# Patient Record
Sex: FEMALE | Race: WHITE | NOT HISPANIC OR LATINO | Employment: OTHER | URBAN - METROPOLITAN AREA
[De-identification: names, ages, dates, MRNs, and addresses within clinical notes are randomized per-mention and may not be internally consistent; named-entity substitution may affect disease eponyms.]

---

## 2021-02-15 ENCOUNTER — OFFICE VISIT (OUTPATIENT)
Dept: FAMILY MEDICINE | Facility: CLINIC | Age: 66
End: 2021-02-15
Payer: COMMERCIAL

## 2021-02-15 VITALS
OXYGEN SATURATION: 100 % | BODY MASS INDEX: 21.34 KG/M2 | HEART RATE: 72 BPM | DIASTOLIC BLOOD PRESSURE: 78 MMHG | HEIGHT: 64 IN | SYSTOLIC BLOOD PRESSURE: 126 MMHG | TEMPERATURE: 97.6 F | WEIGHT: 125 LBS | RESPIRATION RATE: 18 BRPM

## 2021-02-15 DIAGNOSIS — Z12.31 VISIT FOR SCREENING MAMMOGRAM: ICD-10-CM

## 2021-02-15 DIAGNOSIS — Z12.11 SCREEN FOR COLON CANCER: ICD-10-CM

## 2021-02-15 DIAGNOSIS — Z78.0 ASYMPTOMATIC POSTMENOPAUSAL STATUS: ICD-10-CM

## 2021-02-15 DIAGNOSIS — Z01.818 PREOP GENERAL PHYSICAL EXAM: Primary | ICD-10-CM

## 2021-02-15 PROCEDURE — 99204 OFFICE O/P NEW MOD 45 MIN: CPT | Performed by: FAMILY MEDICINE

## 2021-02-15 RX ORDER — IBUPROFEN 800 MG/1
TABLET, FILM COATED ORAL PRN
COMMUNITY
Start: 2020-10-15 | End: 2021-02-15

## 2021-02-15 ASSESSMENT — MIFFLIN-ST. JEOR: SCORE: 1092

## 2021-02-15 NOTE — PROGRESS NOTES
[facility]  [address] [place] [address]  [place] [address]  Phone: [phone]  Primary Provider: Isis Sargent  Pre-op Performing Provider: ISIS SARGENT      PREOPERATIVE EVALUATION:  Today's date: 2/15/2021    Diana Thompson is a 65 year old female who presents for a preoperative evaluation.    Surgical Information:  Surgery/Procedure: Eye Lid - Brow Ptosis Repair  Surgery Location: [facility]  Surgeon: Kristopher Lord MD  Surgery Date: 2/18/21  Time of Surgery: 10:30  Where patient plans to recover: At home with family  Fax number for surgical facility: [phone]    Type of Anesthesia Anticipated: General    Assessment & Plan     The proposed surgical procedure is considered LOW risk.    Preop general physical exam  Doing well    Visit for screening mammogram  Advised   - MA SCREENING DIGITAL BILAT - Future  (s+30); Future    Asymptomatic postmenopausal status  Advised   - DEXA HIP/PELVIS/SPINE - Future; Future    Screen for colon cancer  Advised   - COLOGUARD(EXACT SCIENCES)    Risks and Recommendations:  The patient has the following additional risks and recommendations for perioperative complications:   - No identified additional risk factors other than previously addressed    Medication Instructions:  Patient is on no chronic medications    RECOMMENDATION:  APPROVAL GIVEN to proceed with proposed procedure, without further diagnostic evaluation.    Review of external notes as documented above                   Subjective     HPI related to upcoming procedure: pt scheduled for surgery for Droopy eyelids    Preop Questions 2/15/2021   1. Have you ever had a heart attack or stroke? No   2. Have you ever had surgery on your heart or blood vessels, such as a stent placement, a coronary artery bypass, or surgery on an artery in your head, neck, heart, or legs? No   3. Do you have chest pain with activity? No   4. Do you have a history of  heart failure? No   5. Do you currently have  a cold, bronchitis or symptoms of other infection? No   6. Do you have a cough, shortness of breath, or wheezing? No   7. Do you or anyone in your family have previous history of blood clots? No   8. Do you or does anyone in your family have a serious bleeding problem such as prolonged bleeding following surgeries or cuts? No   9. Have you ever had problems with anemia or been told to take iron pills? YES - many years ago with pregnancy   10. Have you had any abnormal blood loss such as black, tarry or bloody stools, or abnormal vaginal bleeding? No   11. Have you ever had a blood transfusion? YES - several years ago   11a. Have you ever had a transfusion reaction? No   12. Are you willing to have a blood transfusion if it is medically needed before, during, or after your surgery? Yes   13. Have you or any of your relatives ever had problems with anesthesia? No   14. Do you have sleep apnea, excessive snoring or daytime drowsiness? YES - snore   14a. Do you have a CPAP machine? No   15. Do you have any artifical heart valves or other implanted medical devices like a pacemaker, defibrillator, or continuous glucose monitor? No   16. Do you have artificial joints? No   17. Are you allergic to latex? No       Health Care Directive:  Patient does not have a Health Care Directive or Living Will: Discussed advance care planning with patient; information given to patient to review.    Preoperative Review of :   reviewed - no record of controlled substances prescribed.  Review of Systems  CONSTITUTIONAL: NEGATIVE for fever, chills, change in weight  INTEGUMENTARY/SKIN: NEGATIVE for worrisome rashes, moles or lesions  EYES: NEGATIVE for vision changes or irritation  EYES: as above  ENT/MOUTH: NEGATIVE for ear, mouth and throat problems  RESP: NEGATIVE for significant cough or SOB  BREAST: NEGATIVE for masses, tenderness or discharge  CV: NEGATIVE for chest pain, palpitations or peripheral edema  GI: NEGATIVE for nausea,  abdominal pain, heartburn, or change in bowel habits  : NEGATIVE for frequency, dysuria, or hematuria  MUSCULOSKELETAL: NEGATIVE for significant arthralgias or myalgia  NEURO: NEGATIVE for weakness, dizziness or paresthesias  ENDOCRINE: NEGATIVE for temperature intolerance, skin/hair changes  HEME: NEGATIVE for bleeding problems  PSYCHIATRIC: NEGATIVE for changes in mood or affect    Patient Active Problem List    Diagnosis Date Noted     Advanced directives, counseling/discussion 01/22/2014     Priority: Medium     Advance Care Planning:   ACP Review and Resources Provided:  Reviewed chart for advance care plan.  Diana Thompson has no plan or code status on file. Discussed available resources and provided with information. Confirmed code status reflects current choices pending further ACP discussions.  Confirmed/documented designated decision maker(s). See permanent comments section of demographics in clinical tab.  Added by Melissa Behl on 1/22/2014            CARDIOVASCULAR SCREENING; LDL GOAL LESS THAN 160 01/09/2014     Priority: Medium      Past Medical History:   Diagnosis Date     Heart murmur 1981    normal echo      Sleep related leg cramps      Past Surgical History:   Procedure Laterality Date     TUBAL LIGATION  1982     Current Outpatient Medications   Medication Sig Dispense Refill     ketorolac (ACULAR) 0.5 % ophthalmic solution        ofloxacin (OCUFLOX) 0.3 % ophthalmic solution        prednisoLONE acetate (PRED FORTE) 1 % ophthalmic suspension        RESTASIS 0.05 % ophthalmic emulsion          Allergies   Allergen Reactions     Macrodantin [Nitrofurantoin] Hives     Sulfa Drugs         Social History     Tobacco Use     Smoking status: Never Smoker     Smokeless tobacco: Never Used   Substance Use Topics     Alcohol use: Yes     Comment: occasional      Family History   Problem Relation Age of Onset     Diabetes Mother      Heart Disease Mother         CHF     Cancer No family hx of       "Respiratory Father         COPD     Colon Cancer No family hx of      Breast Cancer No family hx of      History   Drug Use No         Objective     /78   Pulse 72   Temp 97.6  F (36.4  C) (Oral)   Resp 18   Ht 1.626 m (5' 4\")   Wt 56.7 kg (125 lb)   SpO2 100%   BMI 21.46 kg/m      Physical Exam    GENERAL APPEARANCE: healthy, alert and no distress     EYES: EOMI, PERRL     HENT: ear canals and TM's normal and nose and mouth without ulcers or lesions     NECK: no adenopathy, no asymmetry, masses, or scars and thyroid normal to palpation     RESP: lungs clear to auscultation - no rales, rhonchi or wheezes     CV: regular rates and rhythm, normal S1 S2, no S3 or S4 and no murmur, click or rub     ABDOMEN:  soft, nontender, no HSM or masses and bowel sounds normal     MS: extremities normal- no gross deformities noted, no evidence of inflammation in joints, FROM in all extremities.     SKIN: no suspicious lesions or rashes     NEURO: Normal strength and tone, sensory exam grossly normal, mentation intact and speech normal     PSYCH: mentation appears normal. and affect normal/bright     LYMPHATICS: No cervical adenopathy    No results for input(s): HGB, PLT, INR, NA, POTASSIUM, CR, A1C in the last 91120 hours.     Diagnostics:     No EKG required for low risk surgery (cataract, skin procedure, breast biopsy, etc).    Revised Cardiac Risk Index (RCRI):  The patient has the following serious cardiovascular risks for perioperative complications:   - No serious cardiac risks = 0 points     RCRI Interpretation: 0 points: Class I (very low risk - 0.4% complication rate)             Signed Electronically by: Isis Hernández MD  Copy of this evaluation report is provided to requesting physician.    [facility] Guidelines    Revised Cardiac Risk Index   "

## 2021-02-15 NOTE — LETTER
March 19, 2021      Diana Thompson  [address] [facility] [address]        Dear Ms.Jay,    We are writing to inform you of your test results.    Your results are normal. Repeat in 3 years.       Resulted Orders   COLOGUARD(EXACT SCIENCES)   Result Value Ref Range    COLOGUARD-ABSTRACT Negative        If you have any questions or concerns, please call the clinic at the number listed above.       Sincerely,      Isis Hernández MD/menendez

## 2021-02-15 NOTE — PATIENT INSTRUCTIONS
Preparing for Your Surgery  Getting started  A nurse will call you to review your health history and instructions. They will give you an arrival time based on your scheduled surgery time.  Please be ready to share the following:    Your doctor's clinic name and phone number    Your medical, surgical and anesthesia history    A list of allergies and sensitivities    A list of medicines, including herbal treatments and over-the-counter drugs    Whether the patient has a legal guardian (ask how to send us the papers in advance)  If you have a child who's having surgery, please ask for a copy of Preparing for Your Child's Surgery.    Preparing for surgery    Within 30 days of surgery: Have a pre-op exam (sometimes called an H&P, or History and Physical). This can be done at a clinic or pre-operative center.  ? If you're having a , you may not need this exam. Talk to your care team    At your pre-op exam, talk to your care team about all medicines you take. If you need to stop any medicines before surgery, ask when to start taking them again.  ? We do this for your safety. Many medicines can make you bleed too much during surgery. Some change how well surgery (anesthesia) drugs work.    Call your insurance company to let them know you're having surgery. (If you don't have insurance, call [phone].)    Call your clinic if there's any change in your health. This includes signs of a cold or flu (sore throat, runny nose, cough, rash, fever). It also includes a scrape or scratch near the surgery site.    If you have questions on the day of surgery, call your hospital or surgery center.  Eating and drinking guidelines  For your safety: Unless your surgeon tells you otherwise, follow the guidelines below.    Eat and drink as usual until 8 hours before surgery. After that, no food or milk.    Drink clear liquids until 2 hours before surgery. These are liquids you can see through, like water, Gatorade and Propel  Water. You may also have black coffee and tea (no cream or milk).    Nothing by mouth within 2 hours of surgery. This includes gum, candy and breath mints.    If you drink, stop drinking alcohol the night before surgery.    If your care team tells you to take medicine on the morning of surgery, it's okay to take it with a sip of water.  Preventing infection    Shower or bathe the night before and morning of your surgery. Follow the instructions your clinic gave you. (If no instructions, use regular soap.)    Don't shave or clip hair near your surgery site. We'll remove the hair if needed.    Don't smoke or vape the morning of surgery. You may chew nicotine gum up to 2 hours before surgery. A nicotine patch is okay.  ? Note: Some surgeries require you to completely quit smoking and nicotine. Check with your surgeon.    Your care team will make every effort to keep you safe from infection. We will:  ? Clean our hands often with soap and water (or an alcohol-based hand rub).  ? Clean the skin at your surgery site with a special soap that kills germs.  ? Give you a special gown to keep you warm. (Cold raises the risk of infection.)  ? Wear special hair covers, masks, gowns and gloves during surgery.  ? Give antibiotic medicine, if prescribed. Not all surgeries need antibiotics.  What to bring on the day of surgery    Photo ID and insurance card    Copy of your health care directive, if you have one    Glasses and hearing aides (bring cases)  ? You can't wear contacts during surgery    Inhaler and eye drops, if you use them (tell us about these when you arrive)    CPAP machine or breathing device, if you use them    A few personal items, if spending the night    If you have . . .  ? A pacemaker or ICD (cardiac defibrillator): Bring the ID card.  ? An implanted stimulator: Bring the remote control.  ? A legal guardian: Bring a copy of the certified (court-stamped) guardianship papers.  Please remove any jewelry, including  body piercings. Leave jewelry and other valuables at home.  If you're going home the day of surgery  Important: If you don't follow the rules below, we must cancel your surgery.     Arrange for someone to drive you home after surgery. You may not drive, take a taxi or take public transportation by yourself (unless you'll have local anesthesia only).    Arrange for a responsible adult to stay with you overnight. If you don't, we may keep you in the hospital overnight, and you may need to pay the costs yourself.  Questions?   If you have any questions for your care team, list them here: _________________________________________________________________________________________________________________________________________________________________________________________________________________________________________________________________________________________________________________________  For informational purposes only. Not to replace the advice of your health care provider. Copyright   2003, 2019 [facility]. All rights reserved. Clinically reviewed by Sierra Stratton MD. [employer] 192169 - REV 4/20.    We are working hard to begin vaccinating more people against COVID-19. Currently, we are only vaccinating individuals age 75 and older and Phase 1a workers - healthcare workers who are unable to do their job remotely. Vaccine availability is very limited.    If you are 75 or older, or a healthcare worker who is unable to do your job remotely, please log in to [employer] using this link to see if we have an open appointment and schedule an appointment.  If there are no appointments left, you will be unable to schedule and need to check back later.  If you are a healthcare worker, you will be asked to provide proof of employment at your appointment. If you cannot, you will be turned away.    Vaccine appointments are being added as they become available. Please check your [employer] account frequently for  availability. If you have technical difficulty using [employer], call [phone] for assistance.    You can learn more about the state's phased approach to administering the vaccine, with details on each phase, [email or web address].      Aa vaccine supply increases and we are able to open appointments to more groups, we will share that information on our website [email or web address]. Check this website to stay up to date on COVID-19 vaccination information.

## 2021-03-12 LAB — COLOGUARD-ABSTRACT: NEGATIVE

## 2021-03-22 ENCOUNTER — ANCILLARY PROCEDURE (OUTPATIENT)
Dept: MAMMOGRAPHY | Facility: CLINIC | Age: 66
End: 2021-03-22
Payer: COMMERCIAL

## 2021-03-22 ENCOUNTER — OFFICE VISIT (OUTPATIENT)
Dept: FAMILY MEDICINE | Facility: CLINIC | Age: 66
End: 2021-03-22
Payer: COMMERCIAL

## 2021-03-22 VITALS
HEIGHT: 64 IN | SYSTOLIC BLOOD PRESSURE: 122 MMHG | HEART RATE: 76 BPM | DIASTOLIC BLOOD PRESSURE: 75 MMHG | TEMPERATURE: 98.6 F | OXYGEN SATURATION: 100 % | WEIGHT: 122 LBS | BODY MASS INDEX: 20.83 KG/M2 | RESPIRATION RATE: 18 BRPM

## 2021-03-22 DIAGNOSIS — Z12.31 VISIT FOR SCREENING MAMMOGRAM: ICD-10-CM

## 2021-03-22 DIAGNOSIS — R63.6 UNDERWEIGHT: ICD-10-CM

## 2021-03-22 DIAGNOSIS — Z71.89 ADVANCED DIRECTIVES, COUNSELING/DISCUSSION: ICD-10-CM

## 2021-03-22 DIAGNOSIS — Z00.00 ENCOUNTER FOR MEDICARE ANNUAL WELLNESS EXAM: Primary | ICD-10-CM

## 2021-03-22 DIAGNOSIS — Z78.0 ASYMPTOMATIC POSTMENOPAUSAL STATUS: ICD-10-CM

## 2021-03-22 LAB
CHOLEST SERPL-MCNC: 279 MG/DL
GLUCOSE SERPL-MCNC: 82 MG/DL (ref 70–99)
HDLC SERPL-MCNC: 86 MG/DL
LDLC SERPL CALC-MCNC: 178 MG/DL
NONHDLC SERPL-MCNC: 193 MG/DL
TRIGL SERPL-MCNC: 76 MG/DL

## 2021-03-22 PROCEDURE — 80061 LIPID PANEL: CPT | Performed by: FAMILY MEDICINE

## 2021-03-22 PROCEDURE — 77067 SCR MAMMO BI INCL CAD: CPT | Mod: TC | Performed by: RADIOLOGY

## 2021-03-22 PROCEDURE — 99397 PER PM REEVAL EST PAT 65+ YR: CPT | Performed by: FAMILY MEDICINE

## 2021-03-22 PROCEDURE — 36415 COLL VENOUS BLD VENIPUNCTURE: CPT | Performed by: FAMILY MEDICINE

## 2021-03-22 PROCEDURE — 84134 ASSAY OF PREALBUMIN: CPT | Performed by: FAMILY MEDICINE

## 2021-03-22 PROCEDURE — 82947 ASSAY GLUCOSE BLOOD QUANT: CPT | Performed by: FAMILY MEDICINE

## 2021-03-22 ASSESSMENT — MIFFLIN-ST. JEOR: SCORE: 1078.39

## 2021-03-22 ASSESSMENT — PAIN SCALES - GENERAL: PAINLEVEL: NO PAIN (0)

## 2021-03-22 ASSESSMENT — ACTIVITIES OF DAILY LIVING (ADL): CURRENT_FUNCTION: NO ASSISTANCE NEEDED

## 2021-03-22 NOTE — PROGRESS NOTES
"SUBJECTIVE:   Diana Thompson is a 65 year old female who presents for Preventive Visit.      Patient has been advised of split billing requirements and indicates understanding: Yes   Are you in the first 12 months of your Medicare coverage?  No    Healthy Habits:    In general, how would you rate your overall health?  Excellent    Frequency of exercise:  None    Do you usually eat at least 4 servings of fruit and vegetables a day, include whole grains    & fiber and avoid regularly eating high fat or \"junk\" foods?  No    Taking medications regularly:  Not Applicable    Barriers to taking medications:  Not applicable    Medication side effects:  Not applicable    Ability to successfully perform activities of daily living:  No assistance needed    Home Safety:  No safety concerns identified    Hearing Impairment:  No hearing concerns    In the past 6 months, have you been bothered by leaking of urine?  No    In general, how would you rate your overall mental or emotional health?  Good      PHQ-2 Total Score:    Additional concerns today:  Yes (LEFT KNEE )    Do you feel safe in your environment? Yes    Have you ever done Advance Care Planning? (For example, a Health Directive, POLST, or a discussion with a medical provider or your loved ones about your wishes): No, advance care planning information given to patient to review.  Patient declined advance care planning discussion at this time.       Fall risk  Fallen 2 or more times in the past year?: No  Any fall with injury in the past year?: No    Cognitive Screening   1) Repeat 3 items (Leader, Season, Table)    2) Clock draw: NORMAL  3) 3 item recall: Recalls 3 objects  Results: NORMAL clock, 1-2 items recalled:     Mini-CogTM Copyright SILVIA Varela. Licensed by the author for use in [facility]; reprinted with permission (gerald@.[facility]). All rights reserved.      Do you have sleep apnea, excessive snoring or daytime drowsiness?: SNORING    Reviewed and updated " as needed this visit by clinical staff  Tobacco  Allergies  Meds   Med Hx  Surg Hx  Fam Hx  Soc Hx        Reviewed and updated as needed this visit by Provider                Social History     Tobacco Use     Smoking status: Never Smoker     Smokeless tobacco: Never Used   Substance Use Topics     Alcohol use: Yes     Comment: occasional      If you drink alcohol do you typically have >3 drinks per day or >7 drinks per week? No    No flowsheet data found.            Current providers sharing in care for this patient include:   Patient Care Team:  Isis Hernández MD as PCP - General (Family Practice)  Isis Hernández MD as Assigned PCP    The following health maintenance items are reviewed in Epic and correct as of today:  Health Maintenance   Topic Date Due     DEXA  Never done     COVID-19 Vaccine (1) Never done     ZOSTER IMMUNIZATION (2 of 3) 11/24/2016     MAMMO SCREENING  07/31/2017     FALL RISK ASSESSMENT  Never done     Pneumococcal Vaccine: 65+ Years (1 of 1 - PPSV23) Never done     INFLUENZA VACCINE (1) 09/01/2020     MEDICARE ANNUAL WELLNESS VISIT  03/22/2022     DTAP/TDAP/TD IMMUNIZATION (2 - Td) 01/13/2024     COLORECTAL CANCER SCREENING  03/12/2024     LIPID  03/22/2026     ADVANCE CARE PLANNING  03/22/2026     HEPATITIS C SCREENING  Completed     PHQ-2  Completed     Pneumococcal Vaccine: Pediatrics (0 to 5 Years) and At-Risk Patients (6 to 64 Years)  Aged Out     IPV IMMUNIZATION  Aged Out     MENINGITIS IMMUNIZATION  Aged Out     HEPATITIS B IMMUNIZATION  Aged Out     Lab work is in process  Labs reviewed in EPIC  BP Readings from Last 3 Encounters:   03/22/21 122/75   02/15/21 126/78   09/29/16 130/68    Wt Readings from Last 3 Encounters:   03/22/21 55.3 kg (122 lb)   02/15/21 56.7 kg (125 lb)   09/29/16 64.4 kg (142 lb)                  Patient Active Problem List   Diagnosis     CARDIOVASCULAR SCREENING; LDL GOAL LESS THAN 160     Advanced directives, counseling/discussion     Past Surgical  "History:   Procedure Laterality Date     TUBAL LIGATION  1982       Social History     Tobacco Use     Smoking status: Never Smoker     Smokeless tobacco: Never Used   Substance Use Topics     Alcohol use: Yes     Comment: occasional      Family History   Problem Relation Age of Onset     Diabetes Mother      Heart Disease Mother         CHF     Cancer No family hx of      Respiratory Father         COPD     Colon Cancer No family hx of      Breast Cancer No family hx of          Current Outpatient Medications   Medication Sig Dispense Refill     ketorolac (ACULAR) 0.5 % ophthalmic solution        ofloxacin (OCUFLOX) 0.3 % ophthalmic solution        prednisoLONE acetate (PRED FORTE) 1 % ophthalmic suspension        RESTASIS 0.05 % ophthalmic emulsion        Allergies   Allergen Reactions     Macrodantin [Nitrofurantoin] Hives     Sulfa Drugs      PT WILL GET MAMMOGRAM ANNUALLY    Review of Systems  CONSTITUTIONAL: NEGATIVE for fever, chills, change in weight  INTEGUMENTARY/SKIN: NEGATIVE for worrisome rashes, moles or lesions  EYES: NEGATIVE for vision changes or irritation  ENT/MOUTH: NEGATIVE for ear, mouth and throat problems  RESP: NEGATIVE for significant cough or SOB  BREAST: NEGATIVE for masses, tenderness or discharge  CV: NEGATIVE for chest pain, palpitations or peripheral edema  GI: NEGATIVE for nausea, abdominal pain, heartburn, or change in bowel habits  : NEGATIVE for frequency, dysuria, or hematuria  MUSCULOSKELETAL: NEGATIVE for significant arthralgias or myalgia  NEURO: NEGATIVE for weakness, dizziness or paresthesias  ENDOCRINE: NEGATIVE for temperature intolerance, skin/hair changes  HEME: NEGATIVE for bleeding problems  PSYCHIATRIC: NEGATIVE for changes in mood or affect    OBJECTIVE:   /75   Pulse 76   Temp 98.6  F (37  C) (Oral)   Resp 18   Ht 1.626 m (5' 4\")   Wt 55.3 kg (122 lb)   SpO2 100%   BMI 20.94 kg/m   Estimated body mass index is 20.94 kg/m  as calculated from the " "following:    Height as of this encounter: 1.626 m (5' 4\").    Weight as of this encounter: 55.3 kg (122 lb).  Physical Exam  GENERAL APPEARANCE: healthy, alert and no distress  EYES: Eyes grossly normal to inspection, PERRL and conjunctivae and sclerae normal  HENT: ear canals and TM's normal, nose and mouth without ulcers or lesions, oropharynx clear and oral mucous membranes moist  NECK: no adenopathy, no asymmetry, masses, or scars and thyroid normal to palpation  RESP: lungs clear to auscultation - no rales, rhonchi or wheezes  BREAST: normal without masses, tenderness or nipple discharge and no palpable axillary masses or adenopathy  CV: regular rate and rhythm, normal S1 S2, no S3 or S4, no murmur, click or rub, no peripheral edema and peripheral pulses strong  ABDOMEN: soft, nontender, no hepatosplenomegaly, no masses and bowel sounds normal  MS: no musculoskeletal defects are noted and gait is age appropriate without ataxia  SKIN: no suspicious lesions or rashes  NEURO: Normal strength and tone, sensory exam grossly normal, mentation intact and speech normal  PSYCH: mentation appears normal and affect normal/bright    Diagnostic Test Results:  Labs reviewed in Epic    ASSESSMENT / PLAN:   1. Encounter for Medicare annual wellness exam  Normal   - Lipid panel reflex to direct LDL Fasting  - Glucose    2. Asymptomatic postmenopausal status    - DX Hip/Pelvis/Spine; Future    3. Advanced directives, counseling/discussion        Patient has been advised of split billing requirements and indicates understanding: as above  COUNSELING:  Reviewed preventive health counseling, as reflected in patient instructions       Regular exercise       Healthy diet/nutrition       Vision screening       Hearing screening       Dental care       Bladder control       Fall risk prevention       Osteoporosis prevention/bone health       Advanced Planning     Estimated body mass index is 20.94 kg/m  as calculated from the " "following:    Height as of this encounter: 1.626 m (5' 4\").    Weight as of this encounter: 55.3 kg (122 lb).    Weight management plan noted, stable and monitoring    She reports that she has never smoked. She has never used smokeless tobacco.      Appropriate preventive services were discussed with this patient, including applicable screening as appropriate for cardiovascular disease, diabetes, osteopenia/osteoporosis, and glaucoma.  As appropriate for age/gender, discussed screening for colorectal cancer, prostate cancer, breast cancer, and cervical cancer. Checklist reviewing preventive services available has been given to the patient.    Reviewed patients plan of care and provided an AVS. The Basic Care Plan (routine screening as documented in Health Maintenance) for Diana meets the Care Plan requirement. This Care Plan has been established and reviewed with the Patient.  Advised Covid shot and Other Preventive Immunizations discussed and importance of getting them  Pt wants to Think about them  Counseling Resources:  ATP IV Guidelines  Pooled Cohorts Equation Calculator  Breast Cancer Risk Calculator  Breast Cancer: Medication to Reduce Risk  FRAX Risk Assessment  ICSI Preventive Guidelines  Dietary Guidelines for Americans, 2010  USDA's MyPlate  ASA Prophylaxis  Lung CA Screening    Isis Hernández MD  [facility]    Identified Health Risks:  "

## 2021-03-22 NOTE — PATIENT INSTRUCTIONS
Patient Education   Personalized Prevention Plan  You are due for the preventive services outlined below.  Your care team is available to assist you in scheduling these services.  If you have already completed any of these items, please share that information with your care team to update in your medical record.  Health Maintenance Due   Topic Date Due     Osteoporosis Screening  Never done     COVID-19 Vaccine (1) Never done     Zoster (Shingles) Vaccine (2 of 3) 11/24/2016     Mammogram  07/31/2017     FALL RISK ASSESSMENT  Never done     Pneumococcal Vaccine (1 of 1 - PPSV23) Never done     Flu Vaccine (1) 09/01/2020       Exercise for a Healthier Heart     Exercise with a friend. When activity is fun, you're more likely to stick with it.   You may wonder how you can improve the health of your heart. If you re thinking about exercise, you re on the right track. You don t need to become an athlete. But you do need a certain amount of brisk exercise to help strengthen your heart. If you have been diagnosed with a heart condition, your healthcare provider may advise exercise to help stabilize your condition. To help make exercise a habit, choose safe, fun activities.   Before you start  Check with your healthcare provider before starting an exercise program. This is especially important if you have not been active for a while. It's also important if you have a long-term (chronic) health problem such as heart disease, diabetes, or obesity. Or if you are at high risk for having these problems.   Why exercise?  Exercising regularly offers many healthy rewards. It can help you do all of the following:    Improve your blood cholesterol level to help prevent further heart trouble    Lower your blood pressure to help prevent a stroke or heart attack    Control diabetes, or reduce your risk of getting this disease    Improve your heart and lung function    Reach and stay at a healthy weight    Make your muscles stronger  so you can stay active    Prevent falls and fractures by slowing the loss of bone mass (osteoporosis)    Manage stress better    Reduce your blood pressure    Improve your sense of self and your body image  Exercise tips      Ease into your routine. Set small goals. Then build on them. If you are not sure what your activity level should be, talk with your healthcare provider first before starting an exercise routine.    Exercise on most days. Aim for a total of 150 minutes (2 hours and 30 minutes) or more of moderate-intensity aerobic activity each week. Or 75 minutes (1 hour and 15 minutes) or more of vigorous-intensity aerobic activity each week. Or try for a combination of both. Moderate activity means that you breathe heavier and your heart rate increases but you can still talk. Think about doing 40 minutes of moderate exercise, 3 to 4 times a week. For best results, activity should last for about 40 minutes to lower blood pressure and cholesterol. It's OK to work up to the 40-minute period over time. Examples of moderate-intensity activity are walking 1 mile in 15 minutes. Or doing 30 to 45 minutes of yard work.    Step up your daily activity level.  Along with your exercise program, try being more active the whole day. Walk instead of drive. Or park further away so that you take more steps each day. Do more household tasks or yard work. You may not be able to meet the advised mount of physical activity. But doing some moderate- or vigorous-intensity aerobic activity can help reduce your risk for heart disease. Your healthcare provider can help you figure out what is best for you.    Choose 1 or more activities you enjoy.  Walking is one of the easiest things you can do. You can also try swimming, riding a bike, dancing, or taking an exercise class.    When to call your healthcare provider  Call your healthcare provider if you have any of these:     Chest pain or feel dizzy or lightheaded    Burning, tightness,  pressure, or heaviness in your chest, neck, shoulders, back, or arms    Abnormal shortness of breath    More joint or muscle pain    A very fast or irregular heartbeat (palpitations)  [employer] last reviewed this educational content on 7/1/2019 2000-2020 [employer]. All rights reserved. This information is not intended as a substitute for professional medical care. Always follow your healthcare professional's instructions.          Understanding USDA MyPlate  The USDA has guidelines to help you make healthy food choices. These are called MyPlate. MyPlate shows the food groups that make up healthy meals using the image of a place setting. Before you eat, think about the healthiest choices for what to put on your plate or in your cup or bowl. To learn more about building a healthy plate, visit www.choosemyplate.gov.     The food groups    Fruits. Any fruit or 100% fruit juice counts as part of the Fruit Group. Fruits may be fresh, canned, frozen, or dried, and may be whole, cut-up, or pureed. Make 1/2 of your plate fruits and vegetables.    Vegetables. Any vegetable or 100% vegetable juice counts as a member of the Vegetable Group. Vegetables may be fresh, frozen, canned, or dried. They can be served raw or cooked and may be whole, cut-up, or mashed. Make 1/2 of your plate fruits and vegetables.    Grains. All foods made from grains are part of the Grains Group. These include wheat, rice, oats, cornmeal, and barley. Grains are often used to make foods such as bread, pasta, oatmeal, cereal, tortillas, and grits. Grains should be no more than 1/4 of your plate. At least half of your grains should be whole grains.    Protein. This group includes meat, poultry, seafood, beans and peas, eggs, processed soy products (such as tofu), nuts (including nut butters), and seeds. Make protein choices no more than 1/4 of your plate. Meat and poultry choices should be lean or low fat.    Dairy. The Dairy Group  includes all fluid milk products and foods made from milk that contain calcium, such as yogurt and cheese. (Foods that have little calcium, such as cream, butter, and cream cheese, are not part of this group.) Most dairy choices should be low-fat or fat-free.    Oils. Oils aren't a food group, but they do contain essential nutrients. However it's important to watch your intake of oils. These are fats that are liquid at room temperature. They include canola, corn, olive, soybean, vegetable, and sunflower oil. Foods that are mainly oil include mayonnaise, certain salad dressings, and soft margarines. You likely already get your daily oil allowance from the foods you eat.  Things to limit  Eating healthy also means limiting these things in your diet:    Salt (sodium). Many processed foods have a lot of sodium. To keep sodium intake down, eat fresh vegetables, meats, poultry, and seafood when possible. Purchase low-sodium, reduced-sodium, or no-salt-added food products at the store. And don't add salt to your meals at home. Instead, season them with herbs and spices such as dill, oregano, cumin, and paprika. Or try adding flavor with lemon or lime zest and juice.    Saturated fat. Saturated fats are most often found in animal products such as beef, pork, and chicken. They are often solid at room temperature, such as butter. To reduce your saturated fat intake, choose leaner cuts of meat and poultry. And try healthier cooking methods such as grilling, broiling, roasting, or baking. For a simple lower-fat swap, use plain nonfat yogurt instead of mayonnaise when making potato salad or macaroni salad.    Added sugars. These are sugars added to foods. They are in foods such as ice cream, candy, soda, fruit drinks, sports drinks, energy drinks, cookies, pastries, jams, and syrups. Cut down on added sugars by sharing sweet treats with a family member or friend. You can also choose fruit for dessert, and drink water or other  unsweetened beverages.  [employer] last reviewed this educational content on 6/1/2020 2000-2020 [employer]. All rights reserved. This information is not intended as a substitute for professional medical care. Always follow your healthcare professional's instructions.        We are working hard to begin vaccinating more people against COVID-19. Currently, we are vaccinating:    Individuals age 65 and older    Phase 1a healthcare workers, both paid and unpaid, who are unable to do their job remotely.     People 16 and older with rare conditions or disabilities that put them at higher risk listed in Phase 1b tier 2.    People age 45-64 years with one or more underlying medical conditions listed in Phase 1b tier 3.    People age 16 or 18-44 years with two or more underlying medical conditions listed in Phase 1b tier 3.    People age 50 years and older in multigenerational housing (three or more generations living in the household)     If you fit into one of these categories, please log in to Attender using this link to see if we have an open appointment and schedule an appointment.  Most new appointments are released on Tuesdays at 8 a.m. This is when appointment availability is best. Additional appointments may open up during the week as appointments are canceled or we receive additional vaccine.    If there are no appointments left, you will be unable to schedule.   If you have technical difficulty using Attender, call [phone] for assistance.      You can learn more about the state's phased approach to administering the vaccine, with details on each phase,?[email or web address].     As vaccine supply increases and we are able to open appointments to more groups, we will share that information on our website:  [email or web address]. Check this website to stay up to date on COVID-19 vaccination information.    PLEASE CONSIDER  GETTING ALL YOUR VACCNES  pNEUMOVAX  sHINGLES  fLU  Sincerely,  Isis Hernández MD

## 2021-03-24 LAB — PREALB SERPL IA-MCNC: 20 MG/DL (ref 15–45)

## 2021-03-25 ENCOUNTER — TELEPHONE (OUTPATIENT)
Dept: FAMILY MEDICINE | Facility: CLINIC | Age: 66
End: 2021-03-25

## 2021-03-25 NOTE — LETTER
[facility]  [address]  [place] [address]  [phone]          March 26, 2021    Diana Thompson                                                                                                                     [address] [facility] [address]            Dear Diana,  The cholesterol is elevated. She should follow a low fat, low cholesterol diet. Repeat test in 6-12 months.  Cholesterol   Date Value Ref Range Status   03/22/2021 279 (H) <200 mg/dL Final     Comment:     Desirable:       <200 mg/dl             Sincerely,       Team Red  Isis Hernández MD

## 2021-03-25 NOTE — TELEPHONE ENCOUNTER
Left message on patient's unidentified voice mail.  Advised patient to call [phone] at her earliest convenience and speak to one of the nurses.        Isis Hernández MD   3/24/2021  6:01 PM CDT      The 10-year ASCVD risk score (Jessicafrankie JOHNSON Jr., et al., 2013) is: 5.6%   Please follow low cholesterol diet  Repeat test 6 months-mail  Sincerely,  Isis Hernández MD

## 2021-04-28 ENCOUNTER — ANCILLARY PROCEDURE (OUTPATIENT)
Dept: BONE DENSITY | Facility: CLINIC | Age: 66
End: 2021-04-28
Attending: FAMILY MEDICINE
Payer: COMMERCIAL

## 2021-04-28 DIAGNOSIS — Z78.0 ASYMPTOMATIC POSTMENOPAUSAL STATUS: ICD-10-CM

## 2021-04-28 PROCEDURE — 77080 DXA BONE DENSITY AXIAL: CPT | Performed by: INTERNAL MEDICINE

## 2021-07-21 ENCOUNTER — TRANSFERRED RECORDS (OUTPATIENT)
Dept: HEALTH INFORMATION MANAGEMENT | Facility: CLINIC | Age: 66
End: 2021-07-21

## 2021-08-03 ENCOUNTER — OFFICE VISIT (OUTPATIENT)
Dept: FAMILY MEDICINE | Facility: CLINIC | Age: 66
End: 2021-08-03
Payer: COMMERCIAL

## 2021-08-03 VITALS
SYSTOLIC BLOOD PRESSURE: 133 MMHG | TEMPERATURE: 97.9 F | BODY MASS INDEX: 21.51 KG/M2 | HEIGHT: 64 IN | WEIGHT: 126 LBS | RESPIRATION RATE: 12 BRPM | HEART RATE: 76 BPM | OXYGEN SATURATION: 99 % | DIASTOLIC BLOOD PRESSURE: 80 MMHG

## 2021-08-03 DIAGNOSIS — H53.452 OTHER LOCALIZED VISUAL FIELD DEFECT, LEFT EYE: ICD-10-CM

## 2021-08-03 DIAGNOSIS — H54.7 VISUAL PROBLEMS: Primary | ICD-10-CM

## 2021-08-03 DIAGNOSIS — H34.02 TRANSIENT RETINAL ARTERY OCCLUSION OF LEFT EYE: ICD-10-CM

## 2021-08-03 PROCEDURE — G0009 ADMIN PNEUMOCOCCAL VACCINE: HCPCS | Performed by: FAMILY MEDICINE

## 2021-08-03 PROCEDURE — 99214 OFFICE O/P EST MOD 30 MIN: CPT | Mod: 25 | Performed by: FAMILY MEDICINE

## 2021-08-03 PROCEDURE — 90732 PPSV23 VACC 2 YRS+ SUBQ/IM: CPT | Performed by: FAMILY MEDICINE

## 2021-08-03 ASSESSMENT — MIFFLIN-ST. JEOR: SCORE: 1091.53

## 2021-08-03 NOTE — PROGRESS NOTES
"    Assessment & Plan     ICD-10-CM    1. Visual problems  H54.7 US Carotid Bilateral     Echocardiogram Complete   2. Other localized visual field defect, left eye   H53.452 US Carotid Bilateral   3. 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   H34.02 Echocardiogram Complete     Await labs   Advised asa 81 mg daily  Follow up if any further symptoms  Isis Hernández MD  [facility]  Discussed about covid vaccine and current recommendations per CDC-pt declined  Subjective   Diana is a 66 year old who presents for the following health issues    HPI     Chief Complaint   Patient presents with     RECHECK     follow up    2 weeks ago left eye -a shade came down 1/2 way down -lasted a few seconds  Had a eye exam at [facility] clinic and was advised to get a carotic ultrasound  Exam was normal   The patient denies any symptoms of neurological impairment or TIA's; nodysphasia, or unilateral disturbance of motor or sensory function. No loss of balance or vertigo.    She has no symptoms now  Review of Systems   Rest of the ROS is Negative except see above and Problem list [stable]  No neuro symptoms      Objective    /80   Resp 12   Ht 1.626 m (5' 4\")   Wt 57.2 kg (126 lb)   SpO2 99%   BMI 21.63 kg/m    Body mass index is 21.63 kg/m .  Physical Exam   GENERAL: healthy, alert and no distress  EYES: Eyes grossly normal to inspection, PERRL and conjunctivae and sclerae normal  NECK: no adenopathy, no asymmetry, masses, or scars and thyroid normal to palpation  RESP: lungs clear to auscultation - no rales, rhonchi or wheezes  CV: regular rate and rhythm, normal S1 S2, no S3 or S4, no murmur, click or rub, no peripheral edema and peripheral pulses strong  ABDOMEN: soft, nontender, no hepatosplenomegaly, no masses and bowel sounds normal  MS: no gross musculoskeletal defects noted, no edema  NEURO: Normal strength and tone, mentation intact and speech normal  PSYCH: mentation appears normal, " affect normal/bright

## 2021-08-03 NOTE — PATIENT INSTRUCTIONS
[facility]    If you have any questions regarding to your visit please contact your care team:       Team Red:   Clinic Hours Telephone Number   [address] Dr, Dr 7am-6pm  Monday - Thursday   7am-5pm  Fridays  [phone]  (Appointment scheduling available 24/7)    Questions about your recent visit?   Team Line  [phone]   Urgent Care - [place] and [facility] - 11am-8pm Monday-Friday Saturday-Sunday- 9am-5pm   [place] - 5pm-8pm Monday-Friday Saturday-Sunday- 9am-5pm  [phone] - [place]  [phone] - [place]       What options do I have for a visit other than an office visit? We offer electronic visits (e-visits) and telephone visits, when medically appropriate.  Please check with your medical insurance to see if these types of visits are covered, as you will be responsible for any charges that are not paid by your insurance.      You can use StudyBlue (secure electronic communication) to access to your chart, send your primary care provider a message, or make an appointment. Ask a team member how to get started.     For a price quote for your services, please call our Consumer Price Line at [phone] or our Imaging Cost estimation line at [phone] (for imaging tests).

## 2021-08-10 ENCOUNTER — ANCILLARY PROCEDURE (OUTPATIENT)
Dept: ULTRASOUND IMAGING | Facility: CLINIC | Age: 66
End: 2021-08-10
Attending: FAMILY MEDICINE
Payer: COMMERCIAL

## 2021-08-10 ENCOUNTER — ANCILLARY PROCEDURE (OUTPATIENT)
Dept: CARDIOLOGY | Facility: CLINIC | Age: 66
End: 2021-08-10
Attending: FAMILY MEDICINE
Payer: COMMERCIAL

## 2021-08-10 DIAGNOSIS — H54.7 VISUAL PROBLEMS: ICD-10-CM

## 2021-08-10 DIAGNOSIS — H53.452 OTHER LOCALIZED VISUAL FIELD DEFECT, LEFT EYE: ICD-10-CM

## 2021-08-10 DIAGNOSIS — H34.02 TRANSIENT RETINAL ARTERY OCCLUSION OF LEFT EYE: ICD-10-CM

## 2021-08-10 LAB — LVEF ECHO: NORMAL

## 2021-08-10 PROCEDURE — 93306 TTE W/DOPPLER COMPLETE: CPT | Performed by: STUDENT IN AN ORGANIZED HEALTH CARE EDUCATION/TRAINING PROGRAM

## 2021-08-10 PROCEDURE — 93880 EXTRACRANIAL BILAT STUDY: CPT | Performed by: RADIOLOGY

## 2021-08-17 ENCOUNTER — TELEPHONE (OUTPATIENT)
Dept: FAMILY MEDICINE | Facility: CLINIC | Age: 66
End: 2021-08-17

## 2021-08-17 NOTE — TELEPHONE ENCOUNTER
Pt called for US and echo results from 8/10. States she did not receive any notification of results. Read result notes to pt.     Pt states she had a pneumonia shot on 8/10. On 8/11 was in the shower and almost passed out.  [place] like she had all her energy sucked up out of her. Area where she had the injection puffed up, got red and was itchy. No difficulty breathing.     Still feels run down today. Went for a bike ride and couldn't go very far. Is doing her normal things. Can't do as much as she used to.   She wanted to let Dr. Hernández know about the reaction she had to the pneumonia shot and see if she had any further recommendations for her.    Poornima Vazquez RN  [facility]

## 2021-08-17 NOTE — TELEPHONE ENCOUNTER
Please call Ultrasound is okay  Echo shows Mild leak in 2 valves  Please follow up  If You are Not feeling better  Please put some Ice on your arm  Please see me if she is Feeling Like passing out

## 2021-08-18 NOTE — TELEPHONE ENCOUNTER
Attempted to call pt at home. This was the first attempt at calling and voice message left to return call to clinic at [phone].    Please give result message from Dr. Hernández when she returns call.    Radha FLANNERY RN, BSN  [facility]

## 2021-08-23 NOTE — TELEPHONE ENCOUNTER
This means that she has less than 50 percent Blockage in the aretries  of her neck  No further evaluation of This at This stage  Treatment is aspirin daily  We can Repeat Ultrasound in 1 year

## 2021-08-23 NOTE — TELEPHONE ENCOUNTER
Relayed this message to patient. She verbalized understanding - no further action needed at this time.    CINTHIA FanN RN  [facility] [place]

## 2021-08-23 NOTE — TELEPHONE ENCOUNTER
"Patient is calling requesting further explanation on results (US Carotid Bilateral 08/10/2021)-    \"IMPRESSION:    1. Less than 50% diameter stenosis of the right ICA relative to the  distal ICA diameter.   2. Less than 50% diameter stenosis of the left ICA relative to the  distal ICA diameter.\"    Routing to PCP to please review and advise.    CINTHIA FanN EVANGELINA  [facility]  "

## 2021-10-20 ENCOUNTER — TELEPHONE (OUTPATIENT)
Dept: FAMILY MEDICINE | Facility: CLINIC | Age: 66
End: 2021-10-20

## 2021-10-20 ENCOUNTER — TRANSFERRED RECORDS (OUTPATIENT)
Dept: HEALTH INFORMATION MANAGEMENT | Facility: CLINIC | Age: 66
End: 2021-10-20

## 2021-10-20 DIAGNOSIS — H54.7 VISUAL PROBLEMS: Primary | ICD-10-CM

## 2021-10-20 DIAGNOSIS — G93.89 BRAIN MASS: ICD-10-CM

## 2021-10-20 NOTE — TELEPHONE ENCOUNTER
Dr. Lamin Henley with [facility] calling.    Patient saw Isis Choi for visual problems on 8/3/2021 and had hal Carotid US and echo completed  Patient was seen by [facility] and they care concern of a possible lesion to the optic chiasm.  They recommend an MRI with focus on the pituitary to rule out lesion in the optic chiasm.  They do not do these at [facility].  Patient wants to get imaging through [facility].  Asked Dr. Lamin Henley if [facility] but he stated that they do not do that.  They are requesting that Isis Choi order the MRI    [facility]- [phone]  Dr. Lamin Henley's cell: [phone]    Krystal Roy RN  [facility]

## 2021-10-20 NOTE — TELEPHONE ENCOUNTER
Called [facility] and LM on their triage line with updates that Isis Choi has ordered the MRI as requested.    Called patient and updated her on the same.  Gave her the number to schedule with imaging  Call the Imaging Center at [phone] to schedule at one of our locations    Krystal Roy RN  [facility]

## 2021-10-28 ENCOUNTER — ANCILLARY PROCEDURE (OUTPATIENT)
Dept: MRI IMAGING | Facility: CLINIC | Age: 66
End: 2021-10-28
Attending: FAMILY MEDICINE
Payer: COMMERCIAL

## 2021-10-28 DIAGNOSIS — H54.7 VISUAL PROBLEMS: ICD-10-CM

## 2021-10-28 PROCEDURE — 70553 MRI BRAIN STEM W/O & W/DYE: CPT | Mod: TC | Performed by: RADIOLOGY

## 2021-10-28 PROCEDURE — A9585 GADOBUTROL INJECTION: HCPCS | Mod: JW | Performed by: RADIOLOGY

## 2021-10-28 RX ORDER — GADOBUTROL 604.72 MG/ML
0.1 INJECTION INTRAVENOUS ONCE
Status: COMPLETED | OUTPATIENT
Start: 2021-10-28 | End: 2021-10-28

## 2021-10-28 RX ADMIN — GADOBUTROL 5.5 ML: 604.72 INJECTION INTRAVENOUS at 09:42

## 2021-10-29 NOTE — TELEPHONE ENCOUNTER
Patient called to let Dr. Hernández know that she has an appointment with Dr. Pickens ([facility]) neurology on Monday, November 1st.  Tasneem Medina,

## 2021-11-01 ENCOUNTER — OFFICE VISIT (OUTPATIENT)
Dept: NEUROSURGERY | Facility: CLINIC | Age: 66
End: 2021-11-01
Attending: FAMILY MEDICINE
Payer: COMMERCIAL

## 2021-11-01 ENCOUNTER — LAB (OUTPATIENT)
Dept: LAB | Facility: CLINIC | Age: 66
End: 2021-11-01
Attending: NEUROLOGICAL SURGERY
Payer: COMMERCIAL

## 2021-11-01 VITALS
OXYGEN SATURATION: 100 % | RESPIRATION RATE: 16 BRPM | HEART RATE: 60 BPM | HEIGHT: 64 IN | SYSTOLIC BLOOD PRESSURE: 148 MMHG | WEIGHT: 120 LBS | DIASTOLIC BLOOD PRESSURE: 81 MMHG | BODY MASS INDEX: 20.49 KG/M2

## 2021-11-01 DIAGNOSIS — E23.6 OTHER DISORDERS OF PITUITARY GLAND (H): ICD-10-CM

## 2021-11-01 DIAGNOSIS — G93.89 BRAIN MASS: ICD-10-CM

## 2021-11-01 LAB
ANION GAP SERPL CALCULATED.3IONS-SCNC: 7 MMOL/L (ref 3–14)
BUN SERPL-MCNC: 12 MG/DL (ref 7–30)
CALCIUM SERPL-MCNC: 8.9 MG/DL (ref 8.5–10.1)
CHLORIDE BLD-SCNC: 100 MMOL/L (ref 94–109)
CO2 SERPL-SCNC: 23 MMOL/L (ref 20–32)
CORTIS SERPL-MCNC: 7.1 UG/DL (ref 4–22)
CREAT SERPL-MCNC: 0.81 MG/DL (ref 0.52–1.04)
FSH SERPL-ACNC: 1.3 IU/L
GFR SERPL CREATININE-BSD FRML MDRD: 75 ML/MIN/1.73M2
GLUCOSE BLD-MCNC: 86 MG/DL (ref 70–99)
LH SERPL-ACNC: <0.2 IU/L
POTASSIUM BLD-SCNC: 3.9 MMOL/L (ref 3.4–5.3)
PROLACTIN SERPL-MCNC: 20 UG/L (ref 3–27)
SODIUM SERPL-SCNC: 130 MMOL/L (ref 133–144)
T4 FREE SERPL-MCNC: 0.57 NG/DL (ref 0.76–1.46)
TSH SERPL DL<=0.005 MIU/L-ACNC: 1.15 MU/L (ref 0.4–4)

## 2021-11-01 PROCEDURE — 84146 ASSAY OF PROLACTIN: CPT

## 2021-11-01 PROCEDURE — 83003 ASSAY GROWTH HORMONE (HGH): CPT

## 2021-11-01 PROCEDURE — 99203 OFFICE O/P NEW LOW 30 MIN: CPT | Performed by: NEUROLOGICAL SURGERY

## 2021-11-01 PROCEDURE — 83002 ASSAY OF GONADOTROPIN (LH): CPT

## 2021-11-01 PROCEDURE — 84443 ASSAY THYROID STIM HORMONE: CPT

## 2021-11-01 PROCEDURE — G0463 HOSPITAL OUTPT CLINIC VISIT: HCPCS

## 2021-11-01 PROCEDURE — 83001 ASSAY OF GONADOTROPIN (FSH): CPT

## 2021-11-01 PROCEDURE — 82397 CHEMILUMINESCENT ASSAY: CPT

## 2021-11-01 PROCEDURE — 84403 ASSAY OF TOTAL TESTOSTERONE: CPT

## 2021-11-01 PROCEDURE — 80048 BASIC METABOLIC PNL TOTAL CA: CPT

## 2021-11-01 PROCEDURE — 82024 ASSAY OF ACTH: CPT

## 2021-11-01 PROCEDURE — 84439 ASSAY OF FREE THYROXINE: CPT

## 2021-11-01 PROCEDURE — 36415 COLL VENOUS BLD VENIPUNCTURE: CPT

## 2021-11-01 PROCEDURE — 84305 ASSAY OF SOMATOMEDIN: CPT

## 2021-11-01 PROCEDURE — 82533 TOTAL CORTISOL: CPT

## 2021-11-01 ASSESSMENT — MIFFLIN-ST. JEOR: SCORE: 1064.32

## 2021-11-01 NOTE — PATIENT INSTRUCTIONS
1. Lab orders for an endocrine panel. Call [phone] to schedule  2. Follow up next monday    EVANGELINA Borjas  --  [facility]  Phone: [phone]  Fax: [phone]

## 2021-11-01 NOTE — NURSING NOTE
"Diana Thompson is a 66 year old female who presents for:  Chief Complaint   Patient presents with     Consult     MRI- Brain Mass         Initial Vitals:  BP (!) 148/81   Pulse 60   Resp 16   Ht 5' 4\" (1.626 m)   Wt 120 lb (54.4 kg)   SpO2 100%   BMI 20.60 kg/m   Estimated body mass index is 20.6 kg/m  as calculated from the following:    Height as of this encounter: 5' 4\" (1.626 m).    Weight as of this encounter: 120 lb (54.4 kg).. Body surface area is 1.57 meters squared. BP completed using cuff size: regular  Data Unavailable    Nursing Comments: Patient states that she had vision concerns. She had lost her upper peripheral vision. She had some other random vision concerns.   Due to the vision changes/Concerns she had the brain MRI.   Mild memory concerns. Some balance concerns.     Adele Castro, [facility]    "

## 2021-11-01 NOTE — PROGRESS NOTES
I was asked by Dr. Hernández to see this patient in consultation    66F w/ bitemporal leah-anopsia, large suprasellar mass.  3 months of episodic blurred vision and diplopia.  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  No endocrine symptoms.       Past Medical History:   Diagnosis Date     Heart murmur 1981    normal echo      Sleep related leg cramps      Past Surgical History:   Procedure Laterality Date     TUBAL LIGATION  1982     Social History     Socioeconomic History     Marital status:      Spouse name: Jeb      Number of children: 2     Years of education: 14     Highest education level: Not on file   Occupational History     Occupation:       Employer: [employer]   Tobacco Use     Smoking status: Never Smoker     Smokeless tobacco: Never Used   Substance and Sexual Activity     Alcohol use: Yes     Comment: occasional      Drug use: No     Sexual activity: Yes     Partners: Male     Birth control/protection: Post-menopausal   Other Topics Concern     Parent/sibling w/ CABG, MI or angioplasty before 65F 55M? Not Asked   Social History Narrative     Not on file     Social Determinants of Health     Financial Resource Strain:      Difficulty of Paying Living Expenses:    Food Insecurity:      Worried About Running Out of Food in the Last Year:      Ran Out of Food in the Last Year:    Transportation Needs:      Lack of Transportation (Medical):      Lack of Transportation (Non-Medical):    Physical Activity:      Days of Exercise per Week:      Minutes of Exercise per Session:    Stress:      Feeling of Stress :    Social Connections:      Frequency of Communication with Friends and Family:      Frequency of Social Gatherings with Friends and Family:      Attends Buddhist Services:      Active Member of Clubs or Organizations:      Attends Club  "or Organization Meetings:      Marital Status:    Intimate Partner Violence:      Fear of Current or Ex-Partner:      Emotionally Abused:      Physically Abused:      Sexually Abused:      Family History   Problem Relation Age of Onset     Diabetes Mother      Heart Disease Mother         CHF     Respiratory Father         COPD     Cancer No family hx of      Colon Cancer No family hx of      Breast Cancer No family hx of         ROS: 10 point ROS neg other than the symptoms noted above in the HPI.    Physical Exam  BP (!) 148/81   Pulse 60   Resp 16   Ht 1.626 m (5' 4\")   Wt 54.4 kg (120 lb)   SpO2 100%   BMI 20.60 kg/m    HEENT:  Normocephalic, atraumatic.  PERRLA.  EOM s intact.  Visual fields full to gross exam  Neck:  Supple, non-tender, without lymphadenopathy.  Heart:  No peripheral edema  Lungs:  No SOB  Abdomen:  Non-distended.   Skin:  Warm and dry.  Extremities:  No edema, cyanosis or clubbing.  Psychiatric:  No apparent distress  Musculoskeletal:  Normal bulk and tone    NEUROLOGICAL EXAMINATION:     Mental status:  Alert and Oriented x 3, speech is fluent.  Vision loss in bilateral, temporal, superior visual fields  Cranial nerves:  II-XII intact.   Motor:    Shoulder Abduction:  Right:  5/5   Left:  5/5  Biceps:                      Right:  5/5   Left:  5/5  Triceps:                     Right:  5/5   Left:  5/5  Wrist Extensors:       Right:  5/5   Left:  5/5  Wrist Flexors:           Right:  5/5   Left:  5/5  interosseus :            Right:  5/5   Left:  5/5  Hip Flexion:                Right: 5/5  Left:  5/5  Quadriceps:             Right:  5/5  Left:  5/5  Hamstrings:             Right:  5/5  Left:  5/5  Gastroc Soleus:        Right:  5/5  Left:  5/5  Tib/Ant:                      Right:  5/5  Left:  5/5  EHL:                     Right:  5/5  Left:  5/5  Sensation:  Intact  Reflexes:  Negative Babinski.  Negative Clonus.  Negative Jerome's.  Coordination:  Smooth finger to nose testing.   " Negative pronator drift.  Smooth tandem walking.    A/P:  66F w/ bitemporal leah-anopsia, large suprasellar mass    I had a discussion with the patient, reviewing the history, symptoms, and imaging  Will obtain endocrine panel  Follow up next week to determine treatment plan

## 2021-11-01 NOTE — LETTER
11/1/2021         RE: Diana Thompson  [address]  [place] [address]        Dear Colleague,    Thank you for referring your patient, Diana Thompson, to the [facility]UR[facility] [place]. Please see a copy of my visit note below.    I was asked by Dr. Hernández to see this patient in consultation    66F w/ bitemporal leah-anopsia, large suprasellar mass.  3 months of episodic blurred vision and diplopia.  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  No endocrine symptoms.       Past Medical History:   Diagnosis Date     Heart murmur 1981    normal echo      Sleep related leg cramps      Past Surgical History:   Procedure Laterality Date     TUBAL LIGATION  1982     Social History     Socioeconomic History     Marital status:      Spouse name: Jeb      Number of children: 2     Years of education: 14     Highest education level: Not on file   Occupational History     Occupation:       Employer: [employer]   Tobacco Use     Smoking status: Never Smoker     Smokeless tobacco: Never Used   Substance and Sexual Activity     Alcohol use: Yes     Comment: occasional      Drug use: No     Sexual activity: Yes     Partners: Male     Birth control/protection: Post-menopausal   Other Topics Concern     Parent/sibling w/ CABG, MI or angioplasty before 65F 55M? Not Asked   Social History Narrative     Not on file     Social Determinants of Health     Financial Resource Strain:      Difficulty of Paying Living Expenses:    Food Insecurity:      Worried About Running Out of Food in the Last Year:      Ran Out of Food in the Last Year:    Transportation Needs:      Lack of Transportation (Medical):      Lack of Transportation (Non-Medical):    Physical Activity:      Days of Exercise per Week:      Minutes of Exercise per Session:    Stress:       "Feeling of Stress :    Social Connections:      Frequency of Communication with Friends and Family:      Frequency of Social Gatherings with Friends and Family:      Attends [employer]ism Services:      Active Member of Clubs or Organizations:      Attends Club or Organization Meetings:      Marital Status:    Intimate Partner Violence:      Fear of Current or Ex-Partner:      Emotionally Abused:      Physically Abused:      Sexually Abused:      Family History   Problem Relation Age of Onset     Diabetes Mother      Heart Disease Mother         CHF     Respiratory Father         COPD     Cancer No family hx of      Colon Cancer No family hx of      Breast Cancer No family hx of         ROS: 10 point ROS neg other than the symptoms noted above in the HPI.    Physical Exam  BP (!) 148/81   Pulse 60   Resp 16   Ht 1.626 m (5' 4\")   Wt 54.4 kg (120 lb)   SpO2 100%   BMI 20.60 kg/m    HEENT:  Normocephalic, atraumatic.  PERRLA.  EOM s intact.  Visual fields full to gross exam  Neck:  Supple, non-tender, without lymphadenopathy.  Heart:  No peripheral edema  Lungs:  No SOB  Abdomen:  Non-distended.   Skin:  Warm and dry.  Extremities:  No edema, cyanosis or clubbing.  Psychiatric:  No apparent distress  Musculoskeletal:  Normal bulk and tone    NEUROLOGICAL EXAMINATION:     Mental status:  Alert and Oriented x 3, speech is fluent.  Vision loss in bilateral, temporal, superior visual fields  Cranial nerves:  II-XII intact.   Motor:    Shoulder Abduction:  Right:  5/5   Left:  5/5  Biceps:                      Right:  5/5   Left:  5/5  Triceps:                     Right:  5/5   Left:  5/5  Wrist Extensors:       Right:  5/5   Left:  5/5  Wrist Flexors:           Right:  5/5   Left:  5/5  interosseus :            Right:  5/5   Left:  5/5  Hip Flexion:                Right: 5/5  Left:  5/5  Quadriceps:             Right:  5/5  Left:  5/5  Hamstrings:             Right:  5/5  Left:  5/5  Gastroc Soleus:        Right:  5/5  " Left:  5/5  Tib/Ant:                      Right:  5/5  Left:  5/5  EHL:                     Right:  5/5  Left:  5/5  Sensation:  Intact  Reflexes:  Negative Babinski.  Negative Clonus.  Negative Jerome's.  Coordination:  Smooth finger to nose testing.   Negative pronator drift.  Smooth tandem walking.    A/P:  66F w/ bitemporal leah-anopsia, large suprasellar mass    I had a discussion with the patient, reviewing the history, symptoms, and imaging  Will obtain endocrine panel  Follow up next week to determine treatment plan         Again, thank you for allowing me to participate in the care of your patient.        Sincerely,        Roberto Pickens MD

## 2021-11-02 LAB
ACTH PLAS-MCNC: <10 PG/ML
GH SERPL-MCNC: 0.1 UG/L
IGF-I BLD-MCNC: 27 NG/ML (ref 30–235)

## 2021-11-05 LAB — TESTOST SERPL-MCNC: 7 NG/DL (ref 8–60)

## 2021-11-08 ENCOUNTER — OFFICE VISIT (OUTPATIENT)
Dept: NEUROSURGERY | Facility: CLINIC | Age: 66
End: 2021-11-08
Attending: NEUROLOGICAL SURGERY
Payer: COMMERCIAL

## 2021-11-08 VITALS — HEART RATE: 67 BPM | OXYGEN SATURATION: 100 % | DIASTOLIC BLOOD PRESSURE: 58 MMHG | SYSTOLIC BLOOD PRESSURE: 155 MMHG

## 2021-11-08 DIAGNOSIS — E23.6 OTHER DISORDERS OF PITUITARY GLAND (H): ICD-10-CM

## 2021-11-08 DIAGNOSIS — G93.89 SUPRASELLAR MASS: Primary | ICD-10-CM

## 2021-11-08 DIAGNOSIS — G93.89 BRAIN MASS: Primary | ICD-10-CM

## 2021-11-08 DIAGNOSIS — Z01.818 PREOP TESTING: ICD-10-CM

## 2021-11-08 LAB — A-PGH SER-MCNC: 0.1 NG/ML

## 2021-11-08 PROCEDURE — G0463 HOSPITAL OUTPT CLINIC VISIT: HCPCS

## 2021-11-08 PROCEDURE — 99214 OFFICE O/P EST MOD 30 MIN: CPT | Performed by: NEUROLOGICAL SURGERY

## 2021-11-08 ASSESSMENT — PAIN SCALES - GENERAL: PAINLEVEL: NO PAIN (0)

## 2021-11-08 NOTE — PATIENT INSTRUCTIONS
Patient Education    Surgery scheduled at [facility] for Pituitary Removal    Pre-Operative:    You will need MRI and CT prior to surgery     Surgical risks  o Blood clots in the leg or lung, problems urinating, nerve damage, drainage from the incision, infection, stiffness    You will need a Pre-operative physical with primary care physician within 30 days of surgical date    Stop all solid foods and liquids 8 hours prior to surgery    Medications  o Discontinue Aspirin, NSAIDs (Advil, Ibuprofen, Naproxen, Nuprin, Diclofenac, Meloxicam, Aleve, Celebrex) x 7 days prior to surgical date. After surgery, do not begin taking these medications until given clearance. It may cause bleeding and interfere with healing  o You can utilize Tylenol for pain during this timeframe (Do not exceed 3,000 mg per day)  o For all other medications (OTC, rx, herbal supplements, etc.) please discuss with your primary care provider     As part of preparation for your upcoming procedure you are required to have a test for the novel Coronavirus, COVID-19.  o Please call the drive-up testing number to schedule your appointment. The test needs to be completed within 4 days (96) hours of surgery.   o Scheduling Number: [phone]    Post-Operative:    2-3 night hospitalization stay    Post operative pain may pain medications and muscle relaxants. You will receive medication upon discharge.    Do NOT drive while taking narcotic pain medication    Signs of Infection  o Redness, swelling, warmth, drainage, and fever of 101 degrees or higher  o Please call the clinic immediately if you notice any of these symptoms     Activity Restrictions  o No coughing, sneezing, nose blowing for 2 weeks  o No bending forward or lifting greater than 10 pounds for atleast 4 weeks    Resources:    Go to the nearest Emergency Room for evaluation if you develop: Acute changes in the level of consciousness (increased confusion, memory loss, speech  abnormalities), any change in hearing or vision, increased headaches, new onset of seizures.    If you are currently employed, you will need to be off work for recovery and healing. Please fax any FMLA/short term disability paperwork to [phone]. You may call our clinic when you'd like to return to work and we can provide a work letter.     Follow up appointments  o Debridements at 1, 2, 4, and 8 weeks post- op  o Will need MRI completed at 3 months post-op appointment  o Please call to schedule follow up appointment at [phone]    [facility]  Phone: [phone]  Fax: [phone]

## 2021-11-08 NOTE — NURSING NOTE
"Diana Thompson is a 66 year old female who presents for:  Chief Complaint   Patient presents with     Consult     1 wk f/u;        Initial Vitals:  BP (!) 155/58   Pulse 67   SpO2 100%  Estimated body mass index is 20.6 kg/m  as calculated from the following:    Height as of 11/1/21: 5' 4\" (1.626 m).    Weight as of 11/1/21: 120 lb (54.4 kg).. There is no height or weight on file to calculate BSA. BP completed using cuff size: regular  No Pain (0)     Chris Waldron MA    "

## 2021-11-08 NOTE — LETTER
11/8/2021         RE: Diana Thompson  [address]  [place] [address]        Dear Colleague,    Thank you for referring your patient, Diana Thompson, to the [facility] [place]. Please see a copy of my visit note below.    Reviewed pre- and post-operative instructions as outlined in the After Visit Summary/Patient Instructions with patient.   Surgery folder was given to patient    Patient Education Topic: Procedure with Dr. Pickens     Learner(s): Patient and Significant other/Spouse     Knowledge Level: Basic    Readiness to Learn: Ready    Method:  Verbal explanation and Written material     Outcome: Able to verbalize instructions    Barriers to Learning: No barrier    Covid Testing: patient educated they will need to have a test for the novel Coronavirus, COVID-19.The test needs to be completed within 4 days (96) hours of surgery. Order Placed.    Scheduling Number: [phone]    Patient had the opportunity for questions to be answered. Advised Patient and Significant other/Spouse  to call clinic with any questions/concerns. Gave patient antibacterial soap for pre-surgery skin preparation.         66F w/ bitemporal leah-anopsia, large suprasellar mass.  3 months of episodic blurred vision and diplopia.  She saw [facility], who noted bitemporal hemianopsia, worse in the superior fields.  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  No endocrine symptoms.    Returns for follow up.  Endocrine panel consistent with non-functioning lesion.       Past Medical History:   Diagnosis Date     Heart murmur 1981    normal echo      Sleep related leg cramps      Past Surgical History:   Procedure Laterality Date     TUBAL LIGATION  1982     Social History     Socioeconomic History     Marital status:      Spouse name: Jeb      Number of children: 2     Years of education: 14     Highest education level: Not on file   Occupational  History     Occupation:       Employer: [employer]   Tobacco Use     Smoking status: Never Smoker     Smokeless tobacco: Never Used   Substance and Sexual Activity     Alcohol use: Yes     Comment: occasional      Drug use: No     Sexual activity: Yes     Partners: Male     Birth control/protection: Post-menopausal   Other Topics Concern     Parent/sibling w/ CABG, MI or angioplasty before 65F 55M? Not Asked   Social History Narrative     Not on file     Social Determinants of Health     Financial Resource Strain: Not on file   Food Insecurity: Not on file   Transportation Needs: Not on file   Physical Activity: Not on file   Stress: Not on file   Social Connections: Not on file   Intimate Partner Violence: Not on file   Housing Stability: Not on file     Family History   Problem Relation Age of Onset     Diabetes Mother      Heart Disease Mother         CHF     Respiratory Father         COPD     Cancer No family hx of      Colon Cancer No family hx of      Breast Cancer No family hx of         ROS: 10 point ROS neg other than the symptoms noted above in the HPI.    Physical Exam  BP (!) 155/58   Pulse 67   SpO2 100%   HEENT:  Normocephalic, atraumatic.  PERRLA.  EOM s intact.  Visual fields full to gross exam  Neck:  Supple, non-tender, without lymphadenopathy.  Heart:  No peripheral edema  Lungs:  No SOB  Abdomen:  Non-distended.   Skin:  Warm and dry.  Extremities:  No edema, cyanosis or clubbing.  Psychiatric:  No apparent distress  Musculoskeletal:  Normal bulk and tone    NEUROLOGICAL EXAMINATION:     Mental status:  Alert and Oriented x 3, speech is fluent.  Vision loss in bilateral, temporal, superior visual fields  Cranial nerves:  II-XII intact.   Motor:    Shoulder Abduction:  Right:  5/5   Left:  5/5  Biceps:                      Right:  5/5   Left:  5/5  Triceps:                     Right:  5/5   Left:  5/5  Wrist Extensors:       Right:  5/5   Left:  5/5  Wrist Flexors:            Right:  5/5   Left:  5/5  interosseus :            Right:  5/5   Left:  5/5  Hip Flexion:                Right: 5/5  Left:  5/5  Quadriceps:             Right:  5/5  Left:  5/5  Hamstrings:             Right:  5/5  Left:  5/5  Gastroc Soleus:        Right:  5/5  Left:  5/5  Tib/Ant:                      Right:  5/5  Left:  5/5  EHL:                     Right:  5/5  Left:  5/5  Sensation:  Intact  Reflexes:  Negative Babinski.  Negative Clonus.  Negative Jerome's.  Coordination:  Smooth finger to nose testing.   Negative pronator drift.  Smooth tandem walking.    A/P:  66F w/ bitemporal leah-anopsia, large suprasellar mass    Will plan for endoscopic endonasal resection  Risks and benefits discussed      Again, thank you for allowing me to participate in the care of your patient.        Sincerely,        Roberto Pickens MD

## 2021-11-08 NOTE — PROGRESS NOTES
66F w/ bitemporal leah-anopsia, large suprasellar mass.  3 months of episodic blurred vision and diplopia.  She saw [facility], who noted bitemporal hemianopsia, worse in the superior fields.  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  No endocrine symptoms.    Returns for follow up.  Endocrine panel consistent with non-functioning lesion.       Past Medical History:   Diagnosis Date     Heart murmur 1981    normal echo      Sleep related leg cramps      Past Surgical History:   Procedure Laterality Date     TUBAL LIGATION  1982     Social History     Socioeconomic History     Marital status:      Spouse name: Jeb      Number of children: 2     Years of education: 14     Highest education level: Not on file   Occupational History     Occupation:       Employer: [employer]   Tobacco Use     Smoking status: Never Smoker     Smokeless tobacco: Never Used   Substance and Sexual Activity     Alcohol use: Yes     Comment: occasional      Drug use: No     Sexual activity: Yes     Partners: Male     Birth control/protection: Post-menopausal   Other Topics Concern     Parent/sibling w/ CABG, MI or angioplasty before 65F 55M? Not Asked   Social History Narrative     Not on file     Social Determinants of Health     Financial Resource Strain: Not on file   Food Insecurity: Not on file   Transportation Needs: Not on file   Physical Activity: Not on file   Stress: Not on file   Social Connections: Not on file   Intimate Partner Violence: Not on file   Housing Stability: Not on file     Family History   Problem Relation Age of Onset     Diabetes Mother      Heart Disease Mother         CHF     Respiratory Father         COPD     Cancer No family hx of      Colon Cancer No family hx of      Breast Cancer No family hx of         ROS: 10 point ROS neg other than the symptoms noted above in the HPI.    Physical Exam  BP (!) 155/58   Pulse 67   SpO2  100%   HEENT:  Normocephalic, atraumatic.  PERRLA.  EOM s intact.  Visual fields full to gross exam  Neck:  Supple, non-tender, without lymphadenopathy.  Heart:  No peripheral edema  Lungs:  No SOB  Abdomen:  Non-distended.   Skin:  Warm and dry.  Extremities:  No edema, cyanosis or clubbing.  Psychiatric:  No apparent distress  Musculoskeletal:  Normal bulk and tone    NEUROLOGICAL EXAMINATION:     Mental status:  Alert and Oriented x 3, speech is fluent.  Vision loss in bilateral, temporal, superior visual fields  Cranial nerves:  II-XII intact.   Motor:    Shoulder Abduction:  Right:  5/5   Left:  5/5  Biceps:                      Right:  5/5   Left:  5/5  Triceps:                     Right:  5/5   Left:  5/5  Wrist Extensors:       Right:  5/5   Left:  5/5  Wrist Flexors:           Right:  5/5   Left:  5/5  interosseus :            Right:  5/5   Left:  5/5  Hip Flexion:                Right: 5/5  Left:  5/5  Quadriceps:             Right:  5/5  Left:  5/5  Hamstrings:             Right:  5/5  Left:  5/5  Gastroc Soleus:        Right:  5/5  Left:  5/5  Tib/Ant:                      Right:  5/5  Left:  5/5  EHL:                     Right:  5/5  Left:  5/5  Sensation:  Intact  Reflexes:  Negative Babinski.  Negative Clonus.  Negative Jerome's.  Coordination:  Smooth finger to nose testing.   Negative pronator drift.  Smooth tandem walking.    A/P:  66F w/ bitemporal leah-anopsia, large suprasellar mass    Will plan for endoscopic endonasal resection  Risks and benefits discussed

## 2021-11-08 NOTE — PROGRESS NOTES
Reviewed pre- and post-operative instructions as outlined in the After Visit Summary/Patient Instructions with patient.   Surgery folder was given to patient    Patient Education Topic: Procedure with Dr. Pickens     Learner(s): Patient and Significant other/Spouse     Knowledge Level: Basic    Readiness to Learn: Ready    Method:  Verbal explanation and Written material     Outcome: Able to verbalize instructions    Barriers to Learning: No barrier    Covid Testing: patient educated they will need to have a test for the novel Coronavirus, COVID-19.The test needs to be completed within 4 days (96) hours of surgery. Order Placed.    Scheduling Number: [phone]    Patient had the opportunity for questions to be answered. Advised Patient and Significant other/Spouse  to call clinic with any questions/concerns. Gave patient antibacterial soap for pre-surgery skin preparation.

## 2021-11-10 ENCOUNTER — HOSPITAL ENCOUNTER (INPATIENT)
Facility: CLINIC | Age: 66
Setting detail: SURGERY ADMIT
End: 2021-11-10
Attending: NEUROLOGICAL SURGERY | Admitting: NEUROLOGICAL SURGERY
Payer: COMMERCIAL

## 2021-11-10 ENCOUNTER — TELEPHONE (OUTPATIENT)
Dept: FAMILY MEDICINE | Facility: CLINIC | Age: 66
End: 2021-11-10
Payer: COMMERCIAL

## 2021-11-10 NOTE — TELEPHONE ENCOUNTER
Patient calling  She stated that her  told her that Isis Choi's office called her yesterday and left a message on their machine.   Patient is calling Isis Choi back.  Explained that there are no recent notes from Isis Choi or anyone within our system regarding an outreach call to her yesterday.  She asked that a message be sent to Isis Choi to verify as Isis Choi sometime checks up on her    Krystal Roy RN  [facility]

## 2021-11-12 DIAGNOSIS — Z11.59 ENCOUNTER FOR SCREENING FOR OTHER VIRAL DISEASES: Primary | ICD-10-CM

## 2021-11-16 ENCOUNTER — OFFICE VISIT (OUTPATIENT)
Dept: FAMILY MEDICINE | Facility: CLINIC | Age: 66
End: 2021-11-16
Payer: COMMERCIAL

## 2021-11-16 VITALS
TEMPERATURE: 97.6 F | SYSTOLIC BLOOD PRESSURE: 128 MMHG | HEIGHT: 64 IN | DIASTOLIC BLOOD PRESSURE: 76 MMHG | RESPIRATION RATE: 18 BRPM | OXYGEN SATURATION: 100 % | BODY MASS INDEX: 21 KG/M2 | WEIGHT: 123 LBS | HEART RATE: 60 BPM

## 2021-11-16 DIAGNOSIS — R63.6 UNDERWEIGHT: ICD-10-CM

## 2021-11-16 DIAGNOSIS — Z01.818 PRE-OP EXAM: Primary | ICD-10-CM

## 2021-11-16 DIAGNOSIS — G93.89 BRAIN MASS: ICD-10-CM

## 2021-11-16 LAB — HGB BLD-MCNC: 11.2 G/DL (ref 11.7–15.7)

## 2021-11-16 PROCEDURE — 93000 ELECTROCARDIOGRAM COMPLETE: CPT | Performed by: FAMILY MEDICINE

## 2021-11-16 PROCEDURE — 85018 HEMOGLOBIN: CPT | Performed by: FAMILY MEDICINE

## 2021-11-16 PROCEDURE — 99214 OFFICE O/P EST MOD 30 MIN: CPT | Performed by: FAMILY MEDICINE

## 2021-11-16 PROCEDURE — 36415 COLL VENOUS BLD VENIPUNCTURE: CPT | Performed by: FAMILY MEDICINE

## 2021-11-16 ASSESSMENT — MIFFLIN-ST. JEOR: SCORE: 1077.92

## 2021-11-16 NOTE — PROGRESS NOTES
[facility]  [address]  [place] [address]  Phone: [phone]  Primary Provider: Yusuf Sargent  Pre-op Performing Provider: YUSUF SARGENT      PREOPERATIVE EVALUATION:  Today's date: 11/16/2021    Diana Thompson is a 66 year old female who presents for a preoperative evaluation.    Surgical Information:  Surgery/Procedure: Endoscopic endonasal resection of suprasellar mass  Surgery Location: [facility]  Surgeon: Dr Pickens  Surgery Date: 11/24/21  Time of Surgery: 7:30  Where patient plans to recover: At home with family  Fax number for surgical facility: Note does not need to be faxed, will be available electronically in Epic.    Type of Anesthesia Anticipated:     Assessment & Plan     The proposed surgical procedure is considered INTERMEDIATE risk.    Pre-op exam    - EKG 12-lead complete w/read - Clinics  - Hemoglobin; Future  - Hemoglobin    Underweight             Risks and Recommendations:  The patient has the following additional risks and recommendations for perioperative complications:   - No identified additional risk factors other than previously addressed    Medication Instructions:  Patient is to take all scheduled medications on the day of surgery EXCEPT for modifications listed below:   - aspirin: Discontinue aspirin 7-10 days prior to procedure to reduce bleeding risk. It should be resumed postoperatively.     RECOMMENDATION:  APPROVAL GIVEN to proceed with proposed procedure pending review of diagnostic evaluation.    Review of external notes as documented above   :[id number]    Subjective     HPI related to upcoming procedure: Pt has a Brain mass and Now scheduled for above.    Preop Questions 11/16/2021   1. Have you ever had a heart attack or stroke? No   2. Have you ever had surgery on your heart or blood vessels, such as a stent placement, a coronary artery bypass, or surgery on an artery in your head, neck, heart, or legs? No   3. Do you have chest  pain with activity? No   4. Do you have a history of  heart failure? No   5. Do you currently have a cold, bronchitis or symptoms of other infection? No   6. Do you have a cough, shortness of breath, or wheezing? No   7. Do you or anyone in your family have previous history of blood clots? No   8. Do you or does anyone in your family have a serious bleeding problem such as prolonged bleeding following surgeries or cuts? No   9. Have you ever had problems with anemia or been told to take iron pills? UNKNOWN -    10. Have you had any abnormal blood loss such as black, tarry or bloody stools, or abnormal vaginal bleeding? No   11. Have you ever had a blood transfusion? No   11a. Have you ever had a transfusion reaction? -   12. Are you willing to have a blood transfusion if it is medically needed before, during, or after your surgery? NO -    13. Have you or any of your relatives ever had problems with anesthesia? No   14. Do you have sleep apnea, excessive snoring or daytime drowsiness? No   14a. Do you have a CPAP machine? -   15. Do you have any artifical heart valves or other implanted medical devices like a pacemaker, defibrillator, or continuous glucose monitor? No   16. Do you have artificial joints? No   17. Are you allergic to latex? No     Past medical history, family history, medications and social history reviewed today and updated in [employer].      Health Care Directive:  Patient does not have a Health Care Directive or Living Will: Discussed advance care planning with patient; information given to patient to review.    Preoperative Review of :   reviewed - no record of controlled substances prescribed.    Review of Systems  CONSTITUTIONAL: NEGATIVE for fever, chills, change in weight  INTEGUMENTARY/SKIN: NEGATIVE for worrisome rashes, moles or lesions  EYES: NEGATIVE for vision changes or irritation  ENT/MOUTH: NEGATIVE for ear, mouth and throat problems  RESP: NEGATIVE for significant cough or SOB  CV:  NEGATIVE for chest pain, palpitations or peripheral edema  GI: NEGATIVE for nausea, abdominal pain, heartburn, or change in bowel habits  : NEGATIVE for frequency, dysuria, or hematuria  MUSCULOSKELETAL: NEGATIVE for significant arthralgias or myalgia  NEURO: NEGATIVE for weakness, dizziness or paresthesias  ENDOCRINE: NEGATIVE for temperature intolerance, skin/hair changes  HEME: NEGATIVE for bleeding problems  PSYCHIATRIC: NEGATIVE for changes in mood or affect    Patient Active Problem List    Diagnosis Date Noted     Brain mass 11/16/2021     Priority: Medium     Underweight 03/22/2021     Priority: Medium     Advanced directives, counseling/discussion 01/22/2014     Priority: Medium     Advance Care Planning:   ACP Review and Resources Provided:  Reviewed chart for advance care plan.  Diana Thompson has no plan or code status on file. Discussed available resources and provided with information. Confirmed code status reflects current choices pending further ACP discussions.  Confirmed/documented designated decision maker(s). See permanent comments section of demographics in clinical tab.  Added by Melissa Behl on 1/22/2014            CARDIOVASCULAR SCREENING; LDL GOAL LESS THAN 160 01/09/2014     Priority: Medium      Past Medical History:   Diagnosis Date     Heart murmur 1981    normal echo      Sleep related leg cramps      Past Surgical History:   Procedure Laterality Date     TUBAL LIGATION  1982     Current Outpatient Medications   Medication Sig Dispense Refill     ketorolac (ACULAR) 0.5 % ophthalmic solution  (Patient not taking: Reported on 11/1/2021)       ofloxacin (OCUFLOX) 0.3 % ophthalmic solution  (Patient not taking: Reported on 11/1/2021)       prednisoLONE acetate (PRED FORTE) 1 % ophthalmic suspension  (Patient not taking: Reported on 11/1/2021)       RESTASIS 0.05 % ophthalmic emulsion  (Patient not taking: Reported on 11/1/2021)         Allergies   Allergen Reactions     Macrodantin  "[Nitrofurantoin] Hives     Sulfa Drugs         Social History     Tobacco Use     Smoking status: Never Smoker     Smokeless tobacco: Never Used   Substance Use Topics     Alcohol use: Yes     Comment: occasional      Family History   Problem Relation Age of Onset     Diabetes Mother      Heart Disease Mother         CHF     Respiratory Father         COPD     Cancer No family hx of      Colon Cancer No family hx of      Breast Cancer No family hx of      History   Drug Use No         Objective     /76   Pulse 60   Temp 97.6  F (36.4  C) (Oral)   Resp 18   Ht 1.626 m (5' 4\")   Wt 55.8 kg (123 lb)   SpO2 100%   BMI 21.11 kg/m      Physical Exam    GENERAL APPEARANCE: healthy, alert and no distress     EYES: EOMI, PERRL     HENT: ear canals and TM's normal and nose and mouth without ulcers or lesions     NECK: no adenopathy, no asymmetry, masses, or scars and thyroid normal to palpation     RESP: lungs clear to auscultation - no rales, rhonchi or wheezes     CV: regular rates and rhythm, normal S1 S2, no S3 or S4 and no murmur, click or rub     ABDOMEN:  soft, nontender, no HSM or masses and bowel sounds normal     MS: extremities normal- no gross deformities noted, no evidence of inflammation in joints, FROM in all extremities.     SKIN: no suspicious lesions or rashes     NEURO: Normal strength and tone, sensory exam grossly normal, mentation intact and speech normal     PSYCH: mentation appears normal. and affect normal/bright     LYMPHATICS: No cervical adenopathy    Recent Labs   Lab Test 11/01/21  1327   *   POTASSIUM 3.9   CR 0.81        Diagnostics:  Labs pending at this time.  Results will be reviewed when available.   EKG: appears normal, NSR, normal axis, normal intervals, no acute ST/T changes c/w ischemia, no LVH by voltage criteria, unchanged from previous tracings    Revised Cardiac Risk Index (RCRI):  The patient has the following serious cardiovascular risks for perioperative " complications:   - No serious cardiac risks = 0 points     RCRI Interpretation: 0 points: Class I (very low risk - 0.4% complication rate)           Signed Electronically by: Isis Hernández MD  Copy of this evaluation report is provided to requesting physician.

## 2021-11-16 NOTE — LETTER
November 19, 2021      Diana Thompson  [address] [facility] [address]        Dear MsSivanJay,    We are writing to inform you of your test results.      Your hemoglobin is slightly low, but is stable for surgery.       Resulted Orders   Hemoglobin   Result Value Ref Range    Hemoglobin 11.2 (L) 11.7 - 15.7 g/dL       If you have any questions or concerns, please call the clinic at the number listed above.       Sincerely,      Isis Hernández MD/menendez

## 2021-11-20 ENCOUNTER — LAB (OUTPATIENT)
Dept: URGENT CARE | Facility: URGENT CARE | Age: 66
End: 2021-11-20
Attending: NEUROLOGICAL SURGERY
Payer: COMMERCIAL

## 2021-11-20 DIAGNOSIS — Z11.59 ENCOUNTER FOR SCREENING FOR OTHER VIRAL DISEASES: ICD-10-CM

## 2021-11-20 DIAGNOSIS — Z01.818 PREOP TESTING: ICD-10-CM

## 2021-11-20 PROCEDURE — U0003 INFECTIOUS AGENT DETECTION BY NUCLEIC ACID (DNA OR RNA); SEVERE ACUTE RESPIRATORY SYNDROME CORONAVIRUS 2 (SARS-COV-2) (CORONAVIRUS DISEASE [COVID-19]), AMPLIFIED PROBE TECHNIQUE, MAKING USE OF HIGH THROUGHPUT TECHNOLOGIES AS DESCRIBED BY CMS-2020-01-R: HCPCS

## 2021-11-20 PROCEDURE — U0005 INFEC AGEN DETEC AMPLI PROBE: HCPCS

## 2021-11-21 LAB — SARS-COV-2 RNA RESP QL NAA+PROBE: NEGATIVE

## 2021-11-22 ENCOUNTER — HOSPITAL ENCOUNTER (OUTPATIENT)
Dept: MRI IMAGING | Facility: CLINIC | Age: 66
End: 2021-11-22
Attending: NEUROLOGICAL SURGERY
Payer: COMMERCIAL

## 2021-11-22 ENCOUNTER — HOSPITAL ENCOUNTER (OUTPATIENT)
Dept: CT IMAGING | Facility: CLINIC | Age: 66
End: 2021-11-22
Attending: NEUROLOGICAL SURGERY
Payer: COMMERCIAL

## 2021-11-22 DIAGNOSIS — E23.6 OTHER DISORDERS OF PITUITARY GLAND (H): ICD-10-CM

## 2021-11-22 DIAGNOSIS — G93.89 BRAIN MASS: ICD-10-CM

## 2021-11-22 PROCEDURE — 255N000002 HC RX 255 OP 636: Performed by: NEUROLOGICAL SURGERY

## 2021-11-22 PROCEDURE — 70450 CT HEAD/BRAIN W/O DYE: CPT

## 2021-11-22 PROCEDURE — A9585 GADOBUTROL INJECTION: HCPCS | Performed by: NEUROLOGICAL SURGERY

## 2021-11-22 PROCEDURE — 70552 MRI BRAIN STEM W/DYE: CPT

## 2021-11-22 RX ORDER — DIPHENHYDRAMINE HCL 25 MG
25 TABLET ORAL DAILY PRN
COMMUNITY
End: 2021-11-24 | Stop reason: HOSPADM

## 2021-11-22 RX ORDER — CHLORAL HYDRATE 500 MG
1 CAPSULE ORAL EVERY MORNING
COMMUNITY
End: 2021-11-24 | Stop reason: HOSPADM

## 2021-11-22 RX ORDER — GADOBUTROL 604.72 MG/ML
15 INJECTION INTRAVENOUS ONCE
Status: COMPLETED | OUTPATIENT
Start: 2021-11-22 | End: 2021-11-22

## 2021-11-22 RX ORDER — ASPIRIN 81 MG/1
81 TABLET ORAL EVERY MORNING
COMMUNITY
End: 2021-11-24 | Stop reason: HOSPADM

## 2021-11-22 RX ORDER — CEFAZOLIN SODIUM 2 G/100ML
2 INJECTION, SOLUTION INTRAVENOUS
Status: CANCELLED | OUTPATIENT
Start: 2021-11-22

## 2021-11-22 RX ORDER — CEFAZOLIN SODIUM 2 G/100ML
2 INJECTION, SOLUTION INTRAVENOUS SEE ADMIN INSTRUCTIONS
Status: CANCELLED | OUTPATIENT
Start: 2021-11-22

## 2021-11-22 RX ADMIN — GADOBUTROL 15 ML: 604.72 INJECTION INTRAVENOUS at 19:17

## 2021-11-23 RX ORDER — SODIUM CHLORIDE, SODIUM LACTATE, POTASSIUM CHLORIDE, CALCIUM CHLORIDE 600; 310; 30; 20 MG/100ML; MG/100ML; MG/100ML; MG/100ML
INJECTION, SOLUTION INTRAVENOUS CONTINUOUS
Status: CANCELLED | OUTPATIENT
Start: 2021-11-23

## 2021-11-23 NOTE — PROGRESS NOTES
PTA medications updated by Medication Scribe prior to surgery via phone call with patient (last doses completed by Nurse)     Medication history sources: Patient, Surescripts and H&P  In the past week, patient estimated taking medication this percent of the time: Greater than 90%  Adherence assessment: N/A Not Observed    Significant changes made to the medication list:  None      Additional medication history information:   None    Medication reconciliation completed by provider prior to medication history? No    Time spent in this activity: 20 MINUTES    The information provided in this note is only as accurate as the sources available at the time of update(s)      Prior to Admission medications    Medication Sig Last Dose Taking? Auth Provider   aspirin 81 MG EC tablet Take 81 mg by mouth every morning  at AM Yes Reported, Patient   diphenhydrAMINE (BENADRYL) 25 MG tablet Take 25 mg by mouth daily as needed  at PRN Yes Reported, Patient   fish oil-omega-3 fatty acids 1000 MG capsule Take 1 g by mouth every morning  at AM Yes Reported, Patient   OVER-THE-COUNTER Take 3 tablets by mouth every morning Medication Name: FOCUS FACTOR  at AM Yes Reported, Patient   OVER-THE-COUNTER Take 2 oz by mouth every morning Medication Name: VIBE (ENIEA) LIQUID  at AM Yes Reported, Patient   Vitamin D3 (CHOLECALCIFEROL) 125 MCG (5000 UT) tablet Take 1 tablet by mouth every morning  at AM Yes Reported, Patient

## 2021-11-24 ENCOUNTER — TELEPHONE (OUTPATIENT)
Dept: NEUROSURGERY | Facility: CLINIC | Age: 66
End: 2021-11-24
Payer: COMMERCIAL

## 2021-11-24 NOTE — TELEPHONE ENCOUNTER
Called patient to see if she needed anything. She is doing well. I updated that esperanza will call her on Monday.   She is understanding of the circumstances.     Jany Quinteros RN

## 2021-11-29 DIAGNOSIS — G93.89 SUPRASELLAR MASS: Primary | ICD-10-CM

## 2021-12-01 ENCOUNTER — TELEPHONE (OUTPATIENT)
Dept: FAMILY MEDICINE | Facility: CLINIC | Age: 66
End: 2021-12-01
Payer: COMMERCIAL

## 2021-12-01 DIAGNOSIS — G93.89 SUPRASELLAR MASS: Primary | ICD-10-CM

## 2021-12-01 DIAGNOSIS — E23.6 OTHER DISORDERS OF PITUITARY GLAND (H): ICD-10-CM

## 2021-12-01 DIAGNOSIS — Z01.818 PRE-OP TESTING: Primary | ICD-10-CM

## 2021-12-01 NOTE — TELEPHONE ENCOUNTER
Reason for Call:  Other     Detailed comments: Pt's neurosurgery surgery has been rescheduled to 12/21 from Nov. They are wondering if you can do an addendum to her preop she had on 11/16. Or will she need to see you again.     Phone Number Patient can be reached at: Cell number on file:    Telephone Information:   Mobile [phone]       Best Time: anytime    Can we leave a detailed message on this number? YES    Call taken on 12/1/2021 at 11:39 AM by Sara Mobley

## 2021-12-02 DIAGNOSIS — Z11.59 ENCOUNTER FOR SCREENING FOR OTHER VIRAL DISEASES: ICD-10-CM

## 2021-12-14 ENCOUNTER — TELEPHONE (OUTPATIENT)
Dept: NEUROSURGERY | Facility: OTHER | Age: 66
End: 2021-12-14
Payer: COMMERCIAL

## 2021-12-14 NOTE — TELEPHONE ENCOUNTER
Patient scheduled and MRI at [place]. Previous imaging was done at [facility] due to the specific type of image ordered.Patient is concerned she now isn't scheduled at the correct location. Please review the order and connect with patient.  Imaging is on 12-18-20

## 2021-12-16 ENCOUNTER — OFFICE VISIT (OUTPATIENT)
Dept: FAMILY MEDICINE | Facility: CLINIC | Age: 66
End: 2021-12-16
Payer: COMMERCIAL

## 2021-12-16 VITALS
RESPIRATION RATE: 18 BRPM | HEART RATE: 87 BPM | OXYGEN SATURATION: 99 % | WEIGHT: 123 LBS | BODY MASS INDEX: 21 KG/M2 | SYSTOLIC BLOOD PRESSURE: 133 MMHG | DIASTOLIC BLOOD PRESSURE: 72 MMHG | TEMPERATURE: 98.2 F | HEIGHT: 64 IN

## 2021-12-16 DIAGNOSIS — Z01.818 PREOP GENERAL PHYSICAL EXAM: Primary | ICD-10-CM

## 2021-12-16 DIAGNOSIS — G93.89 BRAIN MASS: ICD-10-CM

## 2021-12-16 LAB — HGB BLD-MCNC: 11 G/DL (ref 11.7–15.7)

## 2021-12-16 PROCEDURE — 99214 OFFICE O/P EST MOD 30 MIN: CPT | Performed by: FAMILY MEDICINE

## 2021-12-16 PROCEDURE — 85018 HEMOGLOBIN: CPT | Performed by: FAMILY MEDICINE

## 2021-12-16 PROCEDURE — 36415 COLL VENOUS BLD VENIPUNCTURE: CPT | Performed by: FAMILY MEDICINE

## 2021-12-16 ASSESSMENT — MIFFLIN-ST. JEOR: SCORE: 1077.92

## 2021-12-16 NOTE — H&P (VIEW-ONLY)
[facility]  [address]  [place] [address]  Phone: [phone]  Primary Provider: Isis Hernández        PREOPERATIVE EVALUATION:  Today's date: 12/16/2021    Diana Thompson is a 66 year old female who presents for a preoperative evaluation.    Surgical Information:  Surgery/Procedure: Endoscopic endonasal resection of suprasellar mass  Surgery Location: [facility]  Surgeon: Dr Pickens and Dr Davis  Surgery Date: 12/21/21  Time of Surgery: 7:30  Where patient plans to recover: At home with family and Other: has to stay in the [place] for [place] days  Fax number for surgical facility: Note does not need to be faxed, will be available electronically in Epic.    Type of Anesthesia Anticipated:     Assessment & Plan     The proposed surgical procedure is considered INTERMEDIATE risk.    Preop general physical exam    - Hemoglobin; Future  - Hemoglobin    Brain mass             Risks and Recommendations:  The patient has the following additional risks and recommendations for perioperative complications:   - No identified additional risk factors other than previously addressed    Medication Instructions:  Patient is on no chronic medications    RECOMMENDATION:  APPROVAL GIVEN to proceed with proposed procedure pending review of diagnostic evaluation.    Review of external notes as documented above       Addendum-Pt is okay for surgery  [id number]}    Subjective     HPI related to upcoming procedure: pt scheduled for above surgery    Preop Questions 12/16/2021   1. Have you ever had a heart attack or stroke? No   2. Have you ever had surgery on your heart or blood vessels, such as a stent placement, a coronary artery bypass, or surgery on an artery in your head, neck, heart, or legs? No   3. Do you have chest pain with activity? No   4. Do you have a history of  heart failure? No   5. Do you currently have a cold, bronchitis or symptoms of other infection? No   6. Do you have a cough,  shortness of breath, or wheezing? No   7. Do you or anyone in your family have previous history of blood clots? No   8. Do you or does anyone in your family have a serious bleeding problem such as prolonged bleeding following surgeries or cuts? No   9. Have you ever had problems with anemia or been told to take iron pills? UNKNOWN -    10. Have you had any abnormal blood loss such as black, tarry or bloody stools, or abnormal vaginal bleeding? No   11. Have you ever had a blood transfusion? No   11a. Have you ever had a transfusion reaction? -   12. Are you willing to have a blood transfusion if it is medically needed before, during, or after your surgery? Yes   13. Have you or any of your relatives ever had problems with anesthesia? No   14. Do you have sleep apnea, excessive snoring or daytime drowsiness? No   14a. Do you have a CPAP machine? -   15. Do you have any artifical heart valves or other implanted medical devices like a pacemaker, defibrillator, or continuous glucose monitor? No   16. Do you have artificial joints? No   17. Are you allergic to latex? No       Health Care Directive:  Patient does not have a Health Care Directive or Living Will: Discussed advance care planning with patient; information given to patient to review.    Preoperative Review of :   reviewed - no record of controlled substances prescribed.  Review of Systems  CONSTITUTIONAL: NEGATIVE for fever, chills, change in weight  INTEGUMENTARY/SKIN: NEGATIVE for worrisome rashes, moles or lesions  EYES: blurry vision  ENT/MOUTH: NEGATIVE for ear, mouth and throat problems  RESP: NEGATIVE for significant cough or SOB  CV: NEGATIVE for chest pain, palpitations or peripheral edema  GI: NEGATIVE for nausea, abdominal pain, heartburn, or change in bowel habits  : NEGATIVE for frequency, dysuria, or hematuria  MUSCULOSKELETAL: NEGATIVE for significant arthralgias or myalgia  NEURO: NEGATIVE for weakness, dizziness or  "paresthesias  ENDOCRINE: NEGATIVE for temperature intolerance, skin/hair changes  HEME: NEGATIVE for bleeding problems  PSYCHIATRIC: NEGATIVE for changes in mood or affect    Patient Active Problem List    Diagnosis Date Noted     Brain mass 11/16/2021     Priority: Medium     Underweight 03/22/2021     Priority: Medium     Advanced directives, counseling/discussion 01/22/2014     Priority: Medium     Advance Care Planning:   ACP Review and Resources Provided:  Reviewed chart for advance care plan.  Diana Thompson has no plan or code status on file. Discussed available resources and provided with information. Confirmed code status reflects current choices pending further ACP discussions.  Confirmed/documented designated decision maker(s). See permanent comments section of demographics in clinical tab.  Added by Melissa Behl on 1/22/2014            CARDIOVASCULAR SCREENING; LDL GOAL LESS THAN 160 01/09/2014     Priority: Medium      Past Medical History:   Diagnosis Date     Heart murmur 1981    normal echo      Sleep related leg cramps      Past Surgical History:   Procedure Laterality Date     TUBAL LIGATION  1982     No current outpatient medications on file.       Allergies   Allergen Reactions     Macrodantin [Nitrofurantoin] Hives     Sulfa Drugs         Social History     Tobacco Use     Smoking status: Never Smoker     Smokeless tobacco: Never Used   Substance Use Topics     Alcohol use: Yes     Comment: occasional      Family History   Problem Relation Age of Onset     Diabetes Mother      Heart Disease Mother         CHF     Respiratory Father         COPD     Cancer No family hx of      Colon Cancer No family hx of      Breast Cancer No family hx of      History   Drug Use No         Objective     /72   Pulse 87   Temp 98.2  F (36.8  C) (Oral)   Resp 18   Ht 1.626 m (5' 4\")   Wt 55.8 kg (123 lb)   SpO2 99%   BMI 21.11 kg/m      Physical Exam    GENERAL APPEARANCE: healthy, alert and no distress    "  EYES: EOMI, PERRL     HENT: ear canals and TM's normal and nose and mouth without ulcers or lesions     NECK: no adenopathy, no asymmetry, masses, or scars and thyroid normal to palpation     RESP: lungs clear to auscultation - no rales, rhonchi or wheezes     CV: regular rates and rhythm, normal S1 S2, no S3 or S4 and no murmur, click or rub     ABDOMEN:  soft, nontender, no HSM or masses and bowel sounds normal     MS: extremities normal- no gross deformities noted, no evidence of inflammation in joints, FROM in all extremities.     SKIN: no suspicious lesions or rashes     NEURO: Normal strength and tone, sensory exam grossly normal, mentation intact and speech normal     PSYCH: mentation appears normal. and affect normal/bright     LYMPHATICS: No cervical adenopathy    Recent Labs   Lab Test 11/16/21  1514 11/01/21  1327   HGB 11.2*  --    NA  --  130*   POTASSIUM  --  3.9   CR  --  0.81        Diagnostics:  Labs pending at this time.  Results will be reviewed when available.   EKG: sinus bradycardia, normal axis, normal intervals, no acute ST/T changes c/w ischemia, no LVH by voltage criteria, done 11-16-21    Revised Cardiac Risk Index (RCRI):  The patient has the following serious cardiovascular risks for perioperative complications:   - No serious cardiac risks = 0 points     RCRI Interpretation: 0 points: Class I (very low risk - 0.4% complication rate)           Signed Electronically by: Isis Hernández MD  Copy of this evaluation report is provided to requesting physician.

## 2021-12-16 NOTE — PROGRESS NOTES
[facility]  [address]  [place] [address]  Phone: [phone]  Primary Provider: Isis Hernández        PREOPERATIVE EVALUATION:  Today's date: 12/16/2021    Diana Thompson is a 66 year old female who presents for a preoperative evaluation.    Surgical Information:  Surgery/Procedure: Endoscopic endonasal resection of suprasellar mass  Surgery Location: [facility]  Surgeon: Dr Pickens and Dr Davis  Surgery Date: 12/21/21  Time of Surgery: 7:30  Where patient plans to recover: At home with family and Other: has to stay in the hopital for 3 days  Fax number for surgical facility: Note does not need to be faxed, will be available electronically in Epic.    Type of Anesthesia Anticipated:     Assessment & Plan     The proposed surgical procedure is considered INTERMEDIATE risk.    Preop general physical exam    - Hemoglobin; Future  - Hemoglobin    Brain mass             Risks and Recommendations:  The patient has the following additional risks and recommendations for perioperative complications:   - No identified additional risk factors other than previously addressed    Medication Instructions:  Patient is on no chronic medications    RECOMMENDATION:  APPROVAL GIVEN to proceed with proposed procedure pending review of diagnostic evaluation.    Review of external notes as documented above       Addendum-Pt is okay for surgery  [id number]}    Subjective     HPI related to upcoming procedure: pt scheduled for above surgery    Preop Questions 12/16/2021   1. Have you ever had a heart attack or stroke? No   2. Have you ever had surgery on your heart or blood vessels, such as a stent placement, a coronary artery bypass, or surgery on an artery in your head, neck, heart, or legs? No   3. Do you have chest pain with activity? No   4. Do you have a history of  heart failure? No   5. Do you currently have a cold, bronchitis or symptoms of other infection? No   6. Do you have a cough,  shortness of breath, or wheezing? No   7. Do you or anyone in your family have previous history of blood clots? No   8. Do you or does anyone in your family have a serious bleeding problem such as prolonged bleeding following surgeries or cuts? No   9. Have you ever had problems with anemia or been told to take iron pills? UNKNOWN -    10. Have you had any abnormal blood loss such as black, tarry or bloody stools, or abnormal vaginal bleeding? No   11. Have you ever had a blood transfusion? No   11a. Have you ever had a transfusion reaction? -   12. Are you willing to have a blood transfusion if it is medically needed before, during, or after your surgery? Yes   13. Have you or any of your relatives ever had problems with anesthesia? No   14. Do you have sleep apnea, excessive snoring or daytime drowsiness? No   14a. Do you have a CPAP machine? -   15. Do you have any artifical heart valves or other implanted medical devices like a pacemaker, defibrillator, or continuous glucose monitor? No   16. Do you have artificial joints? No   17. Are you allergic to latex? No       Health Care Directive:  Patient does not have a Health Care Directive or Living Will: Discussed advance care planning with patient; information given to patient to review.    Preoperative Review of :   reviewed - no record of controlled substances prescribed.  Review of Systems  CONSTITUTIONAL: NEGATIVE for fever, chills, change in weight  INTEGUMENTARY/SKIN: NEGATIVE for worrisome rashes, moles or lesions  EYES: blurry vision  ENT/MOUTH: NEGATIVE for ear, mouth and throat problems  RESP: NEGATIVE for significant cough or SOB  CV: NEGATIVE for chest pain, palpitations or peripheral edema  GI: NEGATIVE for nausea, abdominal pain, heartburn, or change in bowel habits  : NEGATIVE for frequency, dysuria, or hematuria  MUSCULOSKELETAL: NEGATIVE for significant arthralgias or myalgia  NEURO: NEGATIVE for weakness, dizziness or  "paresthesias  ENDOCRINE: NEGATIVE for temperature intolerance, skin/hair changes  HEME: NEGATIVE for bleeding problems  PSYCHIATRIC: NEGATIVE for changes in mood or affect    Patient Active Problem List    Diagnosis Date Noted     Brain mass 11/16/2021     Priority: Medium     Underweight 03/22/2021     Priority: Medium     Advanced directives, counseling/discussion 01/22/2014     Priority: Medium     Advance Care Planning:   ACP Review and Resources Provided:  Reviewed chart for advance care plan.  Diana Thompson has no plan or code status on file. Discussed available resources and provided with information. Confirmed code status reflects current choices pending further ACP discussions.  Confirmed/documented designated decision maker(s). See permanent comments section of demographics in clinical tab.  Added by Melissa Behl on 1/22/2014            CARDIOVASCULAR SCREENING; LDL GOAL LESS THAN 160 01/09/2014     Priority: Medium      Past Medical History:   Diagnosis Date     Heart murmur 1981    normal echo      Sleep related leg cramps      Past Surgical History:   Procedure Laterality Date     TUBAL LIGATION  1982     No current outpatient medications on file.       Allergies   Allergen Reactions     Macrodantin [Nitrofurantoin] Hives     Sulfa Drugs         Social History     Tobacco Use     Smoking status: Never Smoker     Smokeless tobacco: Never Used   Substance Use Topics     Alcohol use: Yes     Comment: occasional      Family History   Problem Relation Age of Onset     Diabetes Mother      Heart Disease Mother         CHF     Respiratory Father         COPD     Cancer No family hx of      Colon Cancer No family hx of      Breast Cancer No family hx of      History   Drug Use No         Objective     /72   Pulse 87   Temp 98.2  F (36.8  C) (Oral)   Resp 18   Ht 1.626 m (5' 4\")   Wt 55.8 kg (123 lb)   SpO2 99%   BMI 21.11 kg/m      Physical Exam    GENERAL APPEARANCE: healthy, alert and no distress    "  EYES: EOMI, PERRL     HENT: ear canals and TM's normal and nose and mouth without ulcers or lesions     NECK: no adenopathy, no asymmetry, masses, or scars and thyroid normal to palpation     RESP: lungs clear to auscultation - no rales, rhonchi or wheezes     CV: regular rates and rhythm, normal S1 S2, no S3 or S4 and no murmur, click or rub     ABDOMEN:  soft, nontender, no HSM or masses and bowel sounds normal     MS: extremities normal- no gross deformities noted, no evidence of inflammation in joints, FROM in all extremities.     SKIN: no suspicious lesions or rashes     NEURO: Normal strength and tone, sensory exam grossly normal, mentation intact and speech normal     PSYCH: mentation appears normal. and affect normal/bright     LYMPHATICS: No cervical adenopathy    Recent Labs   Lab Test 11/16/21  1514 11/01/21  1327   HGB 11.2*  --    NA  --  130*   POTASSIUM  --  3.9   CR  --  0.81        Diagnostics:  Labs pending at this time.  Results will be reviewed when available.   EKG: sinus bradycardia, normal axis, normal intervals, no acute ST/T changes c/w ischemia, no LVH by voltage criteria, done 11-16-21    Revised Cardiac Risk Index (RCRI):  The patient has the following serious cardiovascular risks for perioperative complications:   - No serious cardiac risks = 0 points     RCRI Interpretation: 0 points: Class I (very low risk - 0.4% complication rate)           Signed Electronically by: Isis Hernández MD  Copy of this evaluation report is provided to requesting physician.

## 2021-12-17 ENCOUNTER — LAB (OUTPATIENT)
Dept: LAB | Facility: CLINIC | Age: 66
End: 2021-12-17
Attending: NEUROLOGICAL SURGERY
Payer: COMMERCIAL

## 2021-12-17 DIAGNOSIS — Z11.59 ENCOUNTER FOR SCREENING FOR OTHER VIRAL DISEASES: ICD-10-CM

## 2021-12-17 DIAGNOSIS — Z01.818 PRE-OP TESTING: ICD-10-CM

## 2021-12-17 PROCEDURE — U0005 INFEC AGEN DETEC AMPLI PROBE: HCPCS

## 2021-12-17 PROCEDURE — U0003 INFECTIOUS AGENT DETECTION BY NUCLEIC ACID (DNA OR RNA); SEVERE ACUTE RESPIRATORY SYNDROME CORONAVIRUS 2 (SARS-COV-2) (CORONAVIRUS DISEASE [COVID-19]), AMPLIFIED PROBE TECHNIQUE, MAKING USE OF HIGH THROUGHPUT TECHNOLOGIES AS DESCRIBED BY CMS-2020-01-R: HCPCS

## 2021-12-18 ENCOUNTER — HOSPITAL ENCOUNTER (OUTPATIENT)
Dept: MRI IMAGING | Facility: CLINIC | Age: 66
Discharge: HOME | DRG: 614 | End: 2021-12-18
Attending: NEUROLOGICAL SURGERY | Admitting: NEUROLOGICAL SURGERY
Payer: COMMERCIAL

## 2021-12-18 DIAGNOSIS — G93.89 SUPRASELLAR MASS: ICD-10-CM

## 2021-12-18 DIAGNOSIS — E23.6 OTHER DISORDERS OF PITUITARY GLAND (H): ICD-10-CM

## 2021-12-18 LAB — SARS-COV-2 RNA RESP QL NAA+PROBE: NEGATIVE

## 2021-12-18 PROCEDURE — A9585 GADOBUTROL INJECTION: HCPCS | Performed by: NEUROLOGICAL SURGERY

## 2021-12-18 PROCEDURE — 70552 MRI BRAIN STEM W/DYE: CPT

## 2021-12-18 PROCEDURE — 255N000002 HC RX 255 OP 636: Performed by: NEUROLOGICAL SURGERY

## 2021-12-18 RX ORDER — GADOBUTROL 604.72 MG/ML
20 INJECTION INTRAVENOUS ONCE
Status: COMPLETED | OUTPATIENT
Start: 2021-12-18 | End: 2021-12-18

## 2021-12-18 RX ADMIN — GADOBUTROL 20 ML: 604.72 INJECTION INTRAVENOUS at 14:11

## 2021-12-20 ENCOUNTER — TELEPHONE (OUTPATIENT)
Dept: FAMILY MEDICINE | Facility: CLINIC | Age: 66
End: 2021-12-20
Payer: COMMERCIAL

## 2021-12-20 NOTE — PROGRESS NOTES
PTA medications updated by Medication Scribe prior to surgery via phone call with patient (last doses completed by Nurse)     Medication history sources: Patient and H&P  In the past week, patient estimated taking medication this percent of the time: Greater than 90%  Adherence assessment: N/A Not Observed    Significant changes made to the medication list:  None      Additional medication history information:   None    Medication reconciliation completed by provider prior to medication history? No    Time spent in this activity: 10 minutes    The information provided in this note is only as accurate as the sources available at the time of update(s)      Prior to Admission medications    Not on File       Medication history completed by:    Michel Ruff CPhT  Medication Scribe  [facility]

## 2021-12-20 NOTE — TELEPHONE ENCOUNTER
"[facility] call requesting for Dr. Hernández to update the preop note that was done on 12/16/21 stating patient is cleared. Patient surgery is tomorrow and Dr. Hernández left the note stating\"APPROVAL GIVEN to proceed with proposed procedure pending review of diagnostic evaluation.\" If patient is cleared for surgery [facility] is requesting a new statement stating that.  Cely Mckeon   [facility] Scheduler    "

## 2021-12-21 ENCOUNTER — ANESTHESIA (OUTPATIENT)
Dept: SURGERY | Facility: CLINIC | Age: 66
DRG: 614 | End: 2021-12-21
Payer: COMMERCIAL

## 2021-12-21 ENCOUNTER — HOSPITAL ENCOUNTER (INPATIENT)
Facility: CLINIC | Age: 66
LOS: 3 days | Discharge: HOME | DRG: 614 | End: 2021-12-24
Attending: NEUROLOGICAL SURGERY | Admitting: NEUROLOGICAL SURGERY
Payer: COMMERCIAL

## 2021-12-21 ENCOUNTER — ANESTHESIA EVENT (OUTPATIENT)
Dept: SURGERY | Facility: CLINIC | Age: 66
DRG: 614 | End: 2021-12-21
Payer: COMMERCIAL

## 2021-12-21 DIAGNOSIS — E89.3 STATUS POST TRANSSPHENOIDAL PITUITARY RESECTION (H): Primary | ICD-10-CM

## 2021-12-21 DIAGNOSIS — G93.89 BRAIN MASS: ICD-10-CM

## 2021-12-21 LAB
ABO/RH(D): NORMAL
ANTIBODY SCREEN: NEGATIVE
SODIUM SERPL-SCNC: 134 MMOL/L (ref 133–144)
SODIUM SERPL-SCNC: 135 MMOL/L (ref 133–144)
SPECIMEN EXPIRATION DATE: NORMAL

## 2021-12-21 PROCEDURE — 250N000009 HC RX 250: Performed by: NURSE ANESTHETIST, CERTIFIED REGISTERED

## 2021-12-21 PROCEDURE — 84295 ASSAY OF SERUM SODIUM: CPT | Performed by: ANESTHESIOLOGY

## 2021-12-21 PROCEDURE — 710N000010 HC RECOVERY PHASE 1, LEVEL 2, PER MIN: Performed by: NEUROLOGICAL SURGERY

## 2021-12-21 PROCEDURE — 250N000011 HC RX IP 250 OP 636: Performed by: NURSE PRACTITIONER

## 2021-12-21 PROCEDURE — 250N000013 HC RX MED GY IP 250 OP 250 PS 637: Performed by: ANESTHESIOLOGY

## 2021-12-21 PROCEDURE — 88331 PATH CONSLTJ SURG 1 BLK 1SPC: CPT | Mod: 26 | Performed by: PATHOLOGY

## 2021-12-21 PROCEDURE — 88331 PATH CONSLTJ SURG 1 BLK 1SPC: CPT | Mod: TC | Performed by: NEUROLOGICAL SURGERY

## 2021-12-21 PROCEDURE — 00U24KZ SUPPLEMENT DURA MATER WITH NONAUTOLOGOUS TISSUE SUBSTITUTE, PERCUTANEOUS ENDOSCOPIC APPROACH: ICD-10-PCS | Performed by: OTOLARYNGOLOGY

## 2021-12-21 PROCEDURE — 86901 BLOOD TYPING SEROLOGIC RH(D): CPT | Performed by: ANESTHESIOLOGY

## 2021-12-21 PROCEDURE — 86850 RBC ANTIBODY SCREEN: CPT | Performed by: ANESTHESIOLOGY

## 2021-12-21 PROCEDURE — 250N000011 HC RX IP 250 OP 636: Performed by: ANESTHESIOLOGY

## 2021-12-21 PROCEDURE — 250N000011 HC RX IP 250 OP 636: Performed by: NURSE ANESTHETIST, CERTIFIED REGISTERED

## 2021-12-21 PROCEDURE — 370N000017 HC ANESTHESIA TECHNICAL FEE, PER MIN: Performed by: NEUROLOGICAL SURGERY

## 2021-12-21 PROCEDURE — 36415 COLL VENOUS BLD VENIPUNCTURE: CPT | Performed by: ANESTHESIOLOGY

## 2021-12-21 PROCEDURE — 88300 SURGICAL PATH GROSS: CPT | Mod: 26 | Performed by: SPECIALIST

## 2021-12-21 PROCEDURE — 250N000009 HC RX 250: Performed by: ANESTHESIOLOGY

## 2021-12-21 PROCEDURE — 62165 REMOVE PITUIT TUMOR W/SCOPE: CPT | Mod: 62 | Performed by: OTOLARYNGOLOGY

## 2021-12-21 PROCEDURE — 250N000025 HC SEVOFLURANE, PER MIN: Performed by: NEUROLOGICAL SURGERY

## 2021-12-21 PROCEDURE — 360N000085 HC SURGERY LEVEL 5 W/ FLUORO, PER MIN: Performed by: NEUROLOGICAL SURGERY

## 2021-12-21 PROCEDURE — 88307 TISSUE EXAM BY PATHOLOGIST: CPT | Mod: 26 | Performed by: SPECIALIST

## 2021-12-21 PROCEDURE — 258N000003 HC RX IP 258 OP 636: Performed by: NURSE PRACTITIONER

## 2021-12-21 PROCEDURE — 258N000003 HC RX IP 258 OP 636: Performed by: NURSE ANESTHETIST, CERTIFIED REGISTERED

## 2021-12-21 PROCEDURE — 250N000013 HC RX MED GY IP 250 OP 250 PS 637: Performed by: NURSE PRACTITIONER

## 2021-12-21 PROCEDURE — 258N000003 HC RX IP 258 OP 636: Performed by: ANESTHESIOLOGY

## 2021-12-21 PROCEDURE — 258N000003 HC RX IP 258 OP 636: Performed by: NEUROLOGICAL SURGERY

## 2021-12-21 PROCEDURE — 84295 ASSAY OF SERUM SODIUM: CPT | Performed by: NURSE PRACTITIONER

## 2021-12-21 PROCEDURE — 250N000013 HC RX MED GY IP 250 OP 250 PS 637: Performed by: NEUROLOGICAL SURGERY

## 2021-12-21 PROCEDURE — 120N000001 HC R&B MED SURG/OB

## 2021-12-21 PROCEDURE — 999N000141 HC STATISTIC PRE-PROCEDURE NURSING ASSESSMENT: Performed by: NEUROLOGICAL SURGERY

## 2021-12-21 PROCEDURE — 272N000002 HC OR SUPPLY OTHER OPNP: Performed by: NEUROLOGICAL SURGERY

## 2021-12-21 PROCEDURE — 8E09XBZ COMPUTER ASSISTED PROCEDURE OF HEAD AND NECK REGION: ICD-10-PCS | Performed by: OTOLARYNGOLOGY

## 2021-12-21 PROCEDURE — 0GB04ZZ EXCISION OF PITUITARY GLAND, PERCUTANEOUS ENDOSCOPIC APPROACH: ICD-10-PCS | Performed by: OTOLARYNGOLOGY

## 2021-12-21 PROCEDURE — 62165 REMOVE PITUIT TUMOR W/SCOPE: CPT | Mod: 62 | Performed by: NEUROLOGICAL SURGERY

## 2021-12-21 PROCEDURE — 272N000001 HC OR GENERAL SUPPLY STERILE: Performed by: NEUROLOGICAL SURGERY

## 2021-12-21 PROCEDURE — 36415 COLL VENOUS BLD VENIPUNCTURE: CPT | Performed by: NURSE PRACTITIONER

## 2021-12-21 PROCEDURE — 09BM4ZZ EXCISION OF NASAL SEPTUM, PERCUTANEOUS ENDOSCOPIC APPROACH: ICD-10-PCS | Performed by: OTOLARYNGOLOGY

## 2021-12-21 PROCEDURE — 61781 SCAN PROC CRANIAL INTRA: CPT | Performed by: NEUROLOGICAL SURGERY

## 2021-12-21 PROCEDURE — 250N000009 HC RX 250: Performed by: NEUROLOGICAL SURGERY

## 2021-12-21 DEVICE — IMPLANTABLE DEVICE: Type: IMPLANTABLE DEVICE | Site: NOSE | Status: FUNCTIONAL

## 2021-12-21 RX ORDER — FENTANYL CITRATE 50 UG/ML
INJECTION, SOLUTION INTRAMUSCULAR; INTRAVENOUS PRN
Status: DISCONTINUED | OUTPATIENT
Start: 2021-12-21 | End: 2021-12-21

## 2021-12-21 RX ORDER — CEFAZOLIN SODIUM 1 G/3ML
1 INJECTION, POWDER, FOR SOLUTION INTRAMUSCULAR; INTRAVENOUS EVERY 8 HOURS
Status: COMPLETED | OUTPATIENT
Start: 2021-12-21 | End: 2021-12-22

## 2021-12-21 RX ORDER — NALOXONE HYDROCHLORIDE 0.4 MG/ML
0.2 INJECTION, SOLUTION INTRAMUSCULAR; INTRAVENOUS; SUBCUTANEOUS
Status: DISCONTINUED | OUTPATIENT
Start: 2021-12-21 | End: 2021-12-24 | Stop reason: HOSPADM

## 2021-12-21 RX ORDER — HYDRALAZINE HYDROCHLORIDE 20 MG/ML
2.5-5 INJECTION INTRAMUSCULAR; INTRAVENOUS EVERY 10 MIN PRN
Status: DISCONTINUED | OUTPATIENT
Start: 2021-12-21 | End: 2021-12-21 | Stop reason: HOSPADM

## 2021-12-21 RX ORDER — AMOXICILLIN 250 MG
1 CAPSULE ORAL 2 TIMES DAILY
Status: DISCONTINUED | OUTPATIENT
Start: 2021-12-21 | End: 2021-12-24 | Stop reason: HOSPADM

## 2021-12-21 RX ORDER — ONDANSETRON 2 MG/ML
4 INJECTION INTRAMUSCULAR; INTRAVENOUS EVERY 30 MIN PRN
Status: DISCONTINUED | OUTPATIENT
Start: 2021-12-21 | End: 2021-12-21 | Stop reason: HOSPADM

## 2021-12-21 RX ORDER — EPHEDRINE SULFATE 50 MG/ML
INJECTION, SOLUTION INTRAMUSCULAR; INTRAVENOUS; SUBCUTANEOUS PRN
Status: DISCONTINUED | OUTPATIENT
Start: 2021-12-21 | End: 2021-12-21

## 2021-12-21 RX ORDER — SODIUM CHLORIDE, SODIUM LACTATE, POTASSIUM CHLORIDE, CALCIUM CHLORIDE 600; 310; 30; 20 MG/100ML; MG/100ML; MG/100ML; MG/100ML
INJECTION, SOLUTION INTRAVENOUS CONTINUOUS
Status: DISCONTINUED | OUTPATIENT
Start: 2021-12-21 | End: 2021-12-21 | Stop reason: HOSPADM

## 2021-12-21 RX ORDER — CEFAZOLIN SODIUM 2 G/100ML
2 INJECTION, SOLUTION INTRAVENOUS
Status: COMPLETED | OUTPATIENT
Start: 2021-12-21 | End: 2021-12-21

## 2021-12-21 RX ORDER — LIDOCAINE 40 MG/G
CREAM TOPICAL
Status: DISCONTINUED | OUTPATIENT
Start: 2021-12-21 | End: 2021-12-24 | Stop reason: HOSPADM

## 2021-12-21 RX ORDER — LABETALOL HYDROCHLORIDE 5 MG/ML
10-40 INJECTION, SOLUTION INTRAVENOUS EVERY 10 MIN PRN
Status: DISCONTINUED | OUTPATIENT
Start: 2021-12-21 | End: 2021-12-24 | Stop reason: HOSPADM

## 2021-12-21 RX ORDER — CEFAZOLIN SODIUM 2 G/100ML
2 INJECTION, SOLUTION INTRAVENOUS SEE ADMIN INSTRUCTIONS
Status: DISCONTINUED | OUTPATIENT
Start: 2021-12-21 | End: 2021-12-21 | Stop reason: CLARIF

## 2021-12-21 RX ORDER — LIDOCAINE HYDROCHLORIDE 20 MG/ML
INJECTION, SOLUTION INFILTRATION; PERINEURAL PRN
Status: DISCONTINUED | OUTPATIENT
Start: 2021-12-21 | End: 2021-12-21

## 2021-12-21 RX ORDER — ACETAMINOPHEN 325 MG/1
975 TABLET ORAL ONCE
Status: COMPLETED | OUTPATIENT
Start: 2021-12-21 | End: 2021-12-21

## 2021-12-21 RX ORDER — PROCHLORPERAZINE MALEATE 5 MG
5 TABLET ORAL EVERY 6 HOURS PRN
Status: DISCONTINUED | OUTPATIENT
Start: 2021-12-21 | End: 2021-12-24 | Stop reason: HOSPADM

## 2021-12-21 RX ORDER — FENTANYL CITRATE 50 UG/ML
25 INJECTION, SOLUTION INTRAMUSCULAR; INTRAVENOUS EVERY 5 MIN PRN
Status: DISCONTINUED | OUTPATIENT
Start: 2021-12-21 | End: 2021-12-21 | Stop reason: HOSPADM

## 2021-12-21 RX ORDER — MEPERIDINE HYDROCHLORIDE 25 MG/ML
12.5 INJECTION INTRAMUSCULAR; INTRAVENOUS; SUBCUTANEOUS EVERY 5 MIN PRN
Status: DISCONTINUED | OUTPATIENT
Start: 2021-12-21 | End: 2021-12-21 | Stop reason: HOSPADM

## 2021-12-21 RX ORDER — NALOXONE HYDROCHLORIDE 0.4 MG/ML
0.4 INJECTION, SOLUTION INTRAMUSCULAR; INTRAVENOUS; SUBCUTANEOUS
Status: DISCONTINUED | OUTPATIENT
Start: 2021-12-21 | End: 2021-12-24 | Stop reason: HOSPADM

## 2021-12-21 RX ORDER — HYDROMORPHONE HCL IN WATER/PF 6 MG/30 ML
0.2 PATIENT CONTROLLED ANALGESIA SYRINGE INTRAVENOUS EVERY 5 MIN PRN
Status: DISCONTINUED | OUTPATIENT
Start: 2021-12-21 | End: 2021-12-21 | Stop reason: HOSPADM

## 2021-12-21 RX ORDER — ONDANSETRON 2 MG/ML
INJECTION INTRAMUSCULAR; INTRAVENOUS PRN
Status: DISCONTINUED | OUTPATIENT
Start: 2021-12-21 | End: 2021-12-21

## 2021-12-21 RX ORDER — OXYCODONE HYDROCHLORIDE 5 MG/1
10 TABLET ORAL EVERY 4 HOURS PRN
Status: DISCONTINUED | OUTPATIENT
Start: 2021-12-21 | End: 2021-12-24 | Stop reason: HOSPADM

## 2021-12-21 RX ORDER — DEXAMETHASONE SODIUM PHOSPHATE 4 MG/ML
INJECTION, SOLUTION INTRA-ARTICULAR; INTRALESIONAL; INTRAMUSCULAR; INTRAVENOUS; SOFT TISSUE PRN
Status: DISCONTINUED | OUTPATIENT
Start: 2021-12-21 | End: 2021-12-21

## 2021-12-21 RX ORDER — LIDOCAINE HYDROCHLORIDE AND EPINEPHRINE 10; 10 MG/ML; UG/ML
INJECTION, SOLUTION INFILTRATION; PERINEURAL PRN
Status: DISCONTINUED | OUTPATIENT
Start: 2021-12-21 | End: 2021-12-21 | Stop reason: HOSPADM

## 2021-12-21 RX ORDER — PROPOFOL 10 MG/ML
INJECTION, EMULSION INTRAVENOUS PRN
Status: DISCONTINUED | OUTPATIENT
Start: 2021-12-21 | End: 2021-12-21

## 2021-12-21 RX ORDER — HYDROMORPHONE HCL IN WATER/PF 6 MG/30 ML
0.4 PATIENT CONTROLLED ANALGESIA SYRINGE INTRAVENOUS
Status: DISCONTINUED | OUTPATIENT
Start: 2021-12-21 | End: 2021-12-24 | Stop reason: HOSPADM

## 2021-12-21 RX ORDER — GLYCOPYRROLATE 0.2 MG/ML
INJECTION, SOLUTION INTRAMUSCULAR; INTRAVENOUS PRN
Status: DISCONTINUED | OUTPATIENT
Start: 2021-12-21 | End: 2021-12-21

## 2021-12-21 RX ORDER — ONDANSETRON 4 MG/1
4 TABLET, ORALLY DISINTEGRATING ORAL EVERY 30 MIN PRN
Status: DISCONTINUED | OUTPATIENT
Start: 2021-12-21 | End: 2021-12-21 | Stop reason: HOSPADM

## 2021-12-21 RX ORDER — PROPOFOL 10 MG/ML
INJECTION, EMULSION INTRAVENOUS CONTINUOUS PRN
Status: DISCONTINUED | OUTPATIENT
Start: 2021-12-21 | End: 2021-12-21

## 2021-12-21 RX ORDER — MULTIPLE VITAMINS W/ MINERALS TAB 9MG-400MCG
1 TAB ORAL DAILY
Status: DISCONTINUED | OUTPATIENT
Start: 2021-12-22 | End: 2021-12-24 | Stop reason: HOSPADM

## 2021-12-21 RX ORDER — LABETALOL HYDROCHLORIDE 5 MG/ML
10 INJECTION, SOLUTION INTRAVENOUS
Status: DISCONTINUED | OUTPATIENT
Start: 2021-12-21 | End: 2021-12-21 | Stop reason: HOSPADM

## 2021-12-21 RX ORDER — HYDROMORPHONE HCL IN WATER/PF 6 MG/30 ML
0.2 PATIENT CONTROLLED ANALGESIA SYRINGE INTRAVENOUS
Status: DISCONTINUED | OUTPATIENT
Start: 2021-12-21 | End: 2021-12-24 | Stop reason: HOSPADM

## 2021-12-21 RX ORDER — SODIUM CHLORIDE, SODIUM LACTATE, POTASSIUM CHLORIDE, CALCIUM CHLORIDE 600; 310; 30; 20 MG/100ML; MG/100ML; MG/100ML; MG/100ML
INJECTION, SOLUTION INTRAVENOUS CONTINUOUS
Status: DISCONTINUED | OUTPATIENT
Start: 2021-12-21 | End: 2021-12-24 | Stop reason: HOSPADM

## 2021-12-21 RX ORDER — OXYCODONE HYDROCHLORIDE 5 MG/1
5 TABLET ORAL EVERY 4 HOURS PRN
Status: DISCONTINUED | OUTPATIENT
Start: 2021-12-21 | End: 2021-12-21 | Stop reason: HOSPADM

## 2021-12-21 RX ORDER — POLYETHYLENE GLYCOL 3350 17 G/17G
17 POWDER, FOR SOLUTION ORAL DAILY
Status: DISCONTINUED | OUTPATIENT
Start: 2021-12-22 | End: 2021-12-24 | Stop reason: HOSPADM

## 2021-12-21 RX ORDER — BISACODYL 10 MG
10 SUPPOSITORY, RECTAL RECTAL DAILY PRN
Status: DISCONTINUED | OUTPATIENT
Start: 2021-12-21 | End: 2021-12-24 | Stop reason: HOSPADM

## 2021-12-21 RX ORDER — OXYCODONE HYDROCHLORIDE 5 MG/1
5 TABLET ORAL EVERY 4 HOURS PRN
Status: DISCONTINUED | OUTPATIENT
Start: 2021-12-21 | End: 2021-12-24 | Stop reason: HOSPADM

## 2021-12-21 RX ORDER — ACETAMINOPHEN 325 MG/1
975 TABLET ORAL EVERY 8 HOURS
Status: COMPLETED | OUTPATIENT
Start: 2021-12-21 | End: 2021-12-24

## 2021-12-21 RX ORDER — SODIUM CHLORIDE, SODIUM LACTATE, POTASSIUM CHLORIDE, CALCIUM CHLORIDE 600; 310; 30; 20 MG/100ML; MG/100ML; MG/100ML; MG/100ML
INJECTION, SOLUTION INTRAVENOUS CONTINUOUS PRN
Status: DISCONTINUED | OUTPATIENT
Start: 2021-12-21 | End: 2021-12-21

## 2021-12-21 RX ORDER — HYDROXYZINE HYDROCHLORIDE 10 MG/1
10 TABLET, FILM COATED ORAL EVERY 6 HOURS PRN
Status: DISCONTINUED | OUTPATIENT
Start: 2021-12-21 | End: 2021-12-24 | Stop reason: HOSPADM

## 2021-12-21 RX ORDER — SODIUM CHLORIDE 9 MG/ML
INJECTION, SOLUTION INTRAVENOUS CONTINUOUS
Status: DISCONTINUED | OUTPATIENT
Start: 2021-12-21 | End: 2021-12-23 | Stop reason: CLARIF

## 2021-12-21 RX ORDER — CALCIUM CARBONATE 500 MG/1
500 TABLET, CHEWABLE ORAL 4 TIMES DAILY PRN
Status: DISCONTINUED | OUTPATIENT
Start: 2021-12-21 | End: 2021-12-21

## 2021-12-21 RX ORDER — ONDANSETRON 4 MG/1
4 TABLET, ORALLY DISINTEGRATING ORAL EVERY 6 HOURS PRN
Status: DISCONTINUED | OUTPATIENT
Start: 2021-12-21 | End: 2021-12-24 | Stop reason: HOSPADM

## 2021-12-21 RX ORDER — HYDRALAZINE HYDROCHLORIDE 20 MG/ML
10-20 INJECTION INTRAMUSCULAR; INTRAVENOUS EVERY 30 MIN PRN
Status: DISCONTINUED | OUTPATIENT
Start: 2021-12-21 | End: 2021-12-24 | Stop reason: HOSPADM

## 2021-12-21 RX ORDER — METHOCARBAMOL 500 MG/1
500 TABLET, FILM COATED ORAL EVERY 6 HOURS PRN
Status: DISCONTINUED | OUTPATIENT
Start: 2021-12-21 | End: 2021-12-24 | Stop reason: HOSPADM

## 2021-12-21 RX ORDER — ACETAMINOPHEN 325 MG/1
650 TABLET ORAL EVERY 4 HOURS PRN
Status: DISCONTINUED | OUTPATIENT
Start: 2021-12-24 | End: 2021-12-24 | Stop reason: HOSPADM

## 2021-12-21 RX ORDER — GINSENG 100 MG
CAPSULE ORAL PRN
Status: DISCONTINUED | OUTPATIENT
Start: 2021-12-21 | End: 2021-12-21 | Stop reason: HOSPADM

## 2021-12-21 RX ORDER — MAGNESIUM HYDROXIDE 1200 MG/15ML
LIQUID ORAL PRN
Status: DISCONTINUED | OUTPATIENT
Start: 2021-12-21 | End: 2021-12-21 | Stop reason: HOSPADM

## 2021-12-21 RX ORDER — CALCIUM CARBONATE 500 MG/1
1000 TABLET, CHEWABLE ORAL 4 TIMES DAILY PRN
Status: DISCONTINUED | OUTPATIENT
Start: 2021-12-21 | End: 2021-12-24 | Stop reason: HOSPADM

## 2021-12-21 RX ORDER — ONDANSETRON 2 MG/ML
4 INJECTION INTRAMUSCULAR; INTRAVENOUS EVERY 6 HOURS PRN
Status: DISCONTINUED | OUTPATIENT
Start: 2021-12-21 | End: 2021-12-24 | Stop reason: HOSPADM

## 2021-12-21 RX ADMIN — PROPOFOL 60 MG: 10 INJECTION, EMULSION INTRAVENOUS at 08:06

## 2021-12-21 RX ADMIN — SUGAMMADEX 200 MG: 100 INJECTION, SOLUTION INTRAVENOUS at 09:58

## 2021-12-21 RX ADMIN — LIDOCAINE HYDROCHLORIDE 100 MG: 20 INJECTION, SOLUTION INFILTRATION; PERINEURAL at 07:46

## 2021-12-21 RX ADMIN — GLYCOPYRROLATE 0.2 MG: 0.2 INJECTION, SOLUTION INTRAMUSCULAR; INTRAVENOUS at 08:39

## 2021-12-21 RX ADMIN — PHENYLEPHRINE HYDROCHLORIDE 0.2 MCG/KG/MIN: 10 INJECTION INTRAVENOUS at 08:38

## 2021-12-21 RX ADMIN — Medication 5 MG: at 07:58

## 2021-12-21 RX ADMIN — ACETAMINOPHEN 975 MG: 325 TABLET, FILM COATED ORAL at 11:17

## 2021-12-21 RX ADMIN — Medication 5 MG: at 08:28

## 2021-12-21 RX ADMIN — ROCURONIUM BROMIDE 20 MG: 50 INJECTION, SOLUTION INTRAVENOUS at 09:02

## 2021-12-21 RX ADMIN — ACETAMINOPHEN 975 MG: 325 TABLET, FILM COATED ORAL at 14:36

## 2021-12-21 RX ADMIN — HYDRALAZINE HYDROCHLORIDE 10 MG: 20 INJECTION INTRAMUSCULAR; INTRAVENOUS at 12:56

## 2021-12-21 RX ADMIN — MIDAZOLAM 2 MG: 1 INJECTION INTRAMUSCULAR; INTRAVENOUS at 07:40

## 2021-12-21 RX ADMIN — HYDROCORTISONE SODIUM SUCCINATE 50 MG: 100 INJECTION, POWDER, FOR SOLUTION INTRAMUSCULAR; INTRAVENOUS at 14:37

## 2021-12-21 RX ADMIN — REMIFENTANIL HYDROCHLORIDE 0.1 MCG/KG/MIN: 1 INJECTION, POWDER, LYOPHILIZED, FOR SOLUTION INTRAVENOUS at 08:11

## 2021-12-21 RX ADMIN — SENNOSIDES AND DOCUSATE SODIUM 1 TABLET: 50; 8.6 TABLET ORAL at 21:40

## 2021-12-21 RX ADMIN — ROCURONIUM BROMIDE 50 MG: 50 INJECTION, SOLUTION INTRAVENOUS at 07:47

## 2021-12-21 RX ADMIN — ONDANSETRON 4 MG: 2 INJECTION INTRAMUSCULAR; INTRAVENOUS at 09:40

## 2021-12-21 RX ADMIN — CEFAZOLIN SODIUM 2 G: 2 INJECTION, SOLUTION INTRAVENOUS at 07:45

## 2021-12-21 RX ADMIN — DEXAMETHASONE SODIUM PHOSPHATE 4 MG: 4 INJECTION, SOLUTION INTRA-ARTICULAR; INTRALESIONAL; INTRAMUSCULAR; INTRAVENOUS; SOFT TISSUE at 08:24

## 2021-12-21 RX ADMIN — Medication 5 MG: at 07:50

## 2021-12-21 RX ADMIN — PROPOFOL 50 MG: 10 INJECTION, EMULSION INTRAVENOUS at 08:17

## 2021-12-21 RX ADMIN — PROPOFOL 140 MG: 10 INJECTION, EMULSION INTRAVENOUS at 07:46

## 2021-12-21 RX ADMIN — HYDROCORTISONE SODIUM SUCCINATE 50 MG: 100 INJECTION, POWDER, FOR SOLUTION INTRAMUSCULAR; INTRAVENOUS at 21:40

## 2021-12-21 RX ADMIN — ACETAMINOPHEN 975 MG: 325 TABLET, FILM COATED ORAL at 21:40

## 2021-12-21 RX ADMIN — CEFAZOLIN 1 G: 1 INJECTION, POWDER, FOR SOLUTION INTRAMUSCULAR; INTRAVENOUS at 16:00

## 2021-12-21 RX ADMIN — FENTANYL CITRATE 100 MCG: 50 INJECTION, SOLUTION INTRAMUSCULAR; INTRAVENOUS at 07:46

## 2021-12-21 RX ADMIN — SODIUM CHLORIDE 250 ML: 9 INJECTION, SOLUTION INTRAVENOUS at 17:15

## 2021-12-21 RX ADMIN — PROPOFOL 30 MCG/KG/MIN: 10 INJECTION, EMULSION INTRAVENOUS at 07:54

## 2021-12-21 RX ADMIN — SODIUM CHLORIDE: 9 INJECTION, SOLUTION INTRAVENOUS at 13:10

## 2021-12-21 RX ADMIN — Medication 20 MCG: at 08:16

## 2021-12-21 RX ADMIN — SODIUM CHLORIDE, POTASSIUM CHLORIDE, SODIUM LACTATE AND CALCIUM CHLORIDE: 600; 310; 30; 20 INJECTION, SOLUTION INTRAVENOUS at 07:50

## 2021-12-21 RX ADMIN — SODIUM CHLORIDE, POTASSIUM CHLORIDE, SODIUM LACTATE AND CALCIUM CHLORIDE: 600; 310; 30; 20 INJECTION, SOLUTION INTRAVENOUS at 06:26

## 2021-12-21 ASSESSMENT — ACTIVITIES OF DAILY LIVING (ADL)
ADLS_ACUITY_SCORE: 6
ADLS_ACUITY_SCORE: 4
ADLS_ACUITY_SCORE: 6
ADLS_ACUITY_SCORE: 6
ADLS_ACUITY_SCORE: 4
ADLS_ACUITY_SCORE: 6
ADLS_ACUITY_SCORE: 4
ADLS_ACUITY_SCORE: 4
ADLS_ACUITY_SCORE: 6
ADLS_ACUITY_SCORE: 4
ADLS_ACUITY_SCORE: 6
ADLS_ACUITY_SCORE: 12
ADLS_ACUITY_SCORE: 6
ADLS_ACUITY_SCORE: 4

## 2021-12-21 ASSESSMENT — MIFFLIN-ST. JEOR: SCORE: 1077.92

## 2021-12-21 ASSESSMENT — VISUAL ACUITY: OU: BASELINE;BLURRED VISION

## 2021-12-21 NOTE — PROGRESS NOTES
Spoke with Raya EDDY of neurosurgery regarding transfer out of ICU to neurology floor.    Received transfer orders for station 73.     Radha Boyd RN (ICU charge)

## 2021-12-21 NOTE — ANESTHESIA POSTPROCEDURE EVALUATION
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    Anesthesia Type:  General    Note:     Postop Pain Control: Uneventful            Sign Out: Well controlled pain   PONV: No   Neuro/Psych: Uneventful            Sign Out: Acceptable/Baseline neuro status   Airway/Respiratory: Uneventful            Sign Out: Acceptable/Baseline resp. status   CV/Hemodynamics: Uneventful            Sign Out: Acceptable CV status; No obvious hypovolemia; No obvious fluid overload   Other NRE: NONE   DID A NON-ROUTINE EVENT OCCUR? No           Last vitals:  Vitals Value Taken Time   /63 12/21/21 1200   Temp 36.4  C (97.6  F) 12/21/21 1145   Pulse 72 12/21/21 1213   Resp 12 12/21/21 1213   SpO2 97 % 12/21/21 1213   Vitals shown include unvalidated device data.    Electronically Signed By: Anne Bustamante MD, MD  December 21, 2021  3:29 PM

## 2021-12-21 NOTE — PROVIDER NOTIFICATION
Spoke to Sarah from neurosurgery, okay to use ice or nasal pain, okay not to have nasal sling if no drainage present.    Spoke to Dr. Bustamante about patient's pain level of 2-3/10. Will order 975 mg of Tylenol.

## 2021-12-21 NOTE — OP NOTE
DATE OF PROCEDURE:  12/21/2021      SURGEON:  Roberto Pickens MD   CO-SURGEON: Eamon Davis MD  ASSISTANT:  Mariela Diaz NP  Note: Dr. Davis's role as co-surgeon was crucial for nasal exposure, handling of the endoscope during surgery, and closure  Note: Mariela Diaz was present for and assisted with the entire surgery, and his/her role as an assistant was crucial for aid in positioning, suctioning, direction of the camera, and closure.       PREOPERATIVE DIAGNOSIS:  Suprasellar mass with optic chiasm compression.       POSTOPERATIVE DIAGNOSIS:  Suprasellar mass with optic chiasm compression.       PROCEDURES:   1.  Endoscopic endonasal transsphenoidal resection of suprasellar mass.   2.  Use of Medtronic Stealth navigation with MRI and CT guidance.       ESTIMATED BLOOD LOSS:  100 mL.       INDICATIONS:  66-year-old female developed vision changes and work-up showed a large suprasellar mass with compression of the optic chiasm.  We discussed that given the vision loss and mass effect on the chiasm, that surgical resection was warranted.  Risks, benefits, indications and alternatives were discussed with the patient and family in detail.  All of their questions were answered, and they wished to proceed with surgery.       DESCRIPTION OF PROCEDURE:  The patient was positioned supine in pins.  Our sterile prepping and draping procedures were performed.  InCytualth navigation was registered with CT and MR guidance.  Antibiotics were administered and timeout was performed.  Please refer to Dr. Davis's separately dictated note regarding nasal and sinus exposure.  Once the sphenoid sinus was entered, the pituitary rongeurs were then used to remove the sphenoid septum, and the Blakesley forceps were used to remove the mucosa of the sphenoid sinus.  In this manner, the sella was exposed.  Navigation was used to identify the borders of the sella and to identify critical structures, including the carotid  arteries and optic nerves.  The pancake was used to remove the bone of the sella, and the retractable knife was used to open the dura of the sella.  Tumor was expressed with the ring forceps, and both frozen and permanent sections were sent to pathology.  Using a combination of a ring forceps and suction the entire visible mass was removed, and the diaphragm sella descended down into view of the surgical field.  The normal pituitary gland was visualized as well.  Hemostasis was achieved with Surgiflo and Gelfoam.  The dural defect was covered with Duragen, and fixed into place with DuraSeal glue.  Final hemostasis was achieved, and a moustache dressing was applied.  There were no intraprocedural complications.

## 2021-12-21 NOTE — ANESTHESIA PREPROCEDURE EVALUATION
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]    Procedure : Procedure(s):  Endoscopic endonasal resection of suprasellar mass          Past Medical History:   Diagnosis Date     Heart murmur     normal echo      Sleep related leg cramps       Past Surgical History:   Procedure Laterality Date     TUBAL LIGATION        Allergies   Allergen Reactions     Macrodantin [Nitrofurantoin] Hives     Sulfa Drugs       Social History     Tobacco Use     Smoking status: Never Smoker     Smokeless tobacco: Never Used   Substance Use Topics     Alcohol use: Yes     Comment: occasional       Wt Readings from Last 1 Encounters:   21 55.8 kg (123 lb)        Anesthesia Evaluation   Pt has had prior anesthetic.     No history of anesthetic complications       ROS/MED HX  ENT/Pulmonary:    (-) sleep apnea   Neurologic: Comment: Brain mass   (-) no CVA   Cardiovascular:     (+) -----valvular problems/murmurs  (-) CAD   METS/Exercise Tolerance:     Hematologic:       Musculoskeletal:       GI/Hepatic:    (-) GERD   Renal/Genitourinary:       Endo:    (-) Type II DM   Psychiatric/Substance Use:       Infectious Disease:       Malignancy:       Other:               OUTSIDE LABS:  CBC:   Lab Results   Component Value Date    HGB 11.0 (L) 2021    HGB 11.2 (L) 2021     BMP:   Lab Results   Component Value Date     (L) 2021    POTASSIUM 3.9 2021    CHLORIDE 100 2021    CO2 23 2021    BUN 12 2021    CR 0.81 2021    GLC 86 2021    GLC 82 2021     COAGS: No results found for: PTT, INR, FIBR  POC: No results found for: BGM, HCG, HCGS  HEPATIC: No results found for: ALBUMIN, PROTTOTAL, ALT, AST, GGT, ALKPHOS, BILITOTAL, BILIDIRECT, NICHOLE  OTHER:   Lab Results   Component Value Date    LA 8.9 2021    TSH 1.15 2021    T4 0.57 (L) 2021        Anesthesia Plan    ASA Status:  2   NPO Status:  NPO Appropriate    Anesthesia Type: General.     - Airway: ETT   Induction: Propofol, Intravenous.   Maintenance: Balanced.   Techniques and Equipment:     - Lines/Monitors: Arterial Line, 2nd IV     - Drips/Meds: Remifentanil, Phenylephrine     Consents            Postoperative Care    Pain management: Multi-modal analgesia.   PONV prophylaxis: Ondansetron (or other 5HT-3), Dexamethasone or Solumedrol     Comments:                Anne Bustamante MD, MD

## 2021-12-21 NOTE — ANESTHESIA PROCEDURE NOTES
Airway       Patient location during procedure: OR       Procedure Start/Stop Times: 12/21/2021 7:48 AM  Staff -        Anesthesiologist:  Anne Bustamante MD       CRNA: Hodgkins, Christine Marie Volp, APRN CRNA       Performed By: CRNA  Consent for Airway        Urgency: elective  Indications and Patient Condition       Indications for airway management: ji-procedural       Induction type:intravenous       Mask difficulty assessment: 1 - vent by mask    Final Airway Details       Final airway type: endotracheal airway       Successful airway: ETT - single  Endotracheal Airway Details        ETT size (mm): 7.0       Cuffed: yes       Successful intubation technique: direct laryngoscopy       DL Blade Type: Godinez 2       Grade View of Cords: 1       Adjucts: stylet       Position: Left       Measured from: lips       Secured at (cm): 22       Bite block used: None    Post intubation assessment        Placement verified by: capnometry, equal breath sounds and chest rise        Number of attempts at approach: 1       Number of other approaches attempted: 0       Secured with: pink tape       Ease of procedure: easy       Dentition: Intact and Unchanged

## 2021-12-21 NOTE — ANESTHESIA PROCEDURE NOTES
Arterial Line Procedure Note    Pre-Procedure   Staff -        Anesthesiologist:  Anne Bustamante MD       Performed By: anesthesiologist       Location: pre-op       Pre-Anesthestic Checklist: patient identified, IV checked, risks and benefits discussed, informed consent, monitors and equipment checked and pre-op evaluation  Timeout:       Correct Patient: Yes        Correct Procedure: Yes        Correct Site: Yes        Correct Position: Yes   Procedure   Procedure: arterial line  Sterile Prep        All elements of maximal sterile barrier technique followed       Patient Prep/Sterile Barriers: hand hygiene, sterile gloves, hat, mask, draped, sterile gown, draped       Skin prep: Chloraprep  Insertion/Injection        Technique: Seldinger Technique        Catheter Type/Size: 20 gauge, 1.75 in/4.5 cm quick cath (integral wire)  Narrative        Arterial waveform: Yes        IBP within 10% of NIBP: Yes

## 2021-12-21 NOTE — PROGRESS NOTES
Pt arrived from PACU at 1230. AVSS. Mild pain noted in head and nose. Neuros grossly intact. Arterial line present. Perez patent. 10 mg of Hydralazine was given to achieve goal blood pressure of SBP<140. Spouse (Jeb) supportive at bedside. Orders received to transfer patient out of ICU to Ortho/Neuro floor. Arterial line removed without complication. Awaiting bed to complete transfer.

## 2021-12-21 NOTE — ANESTHESIA CARE TRANSFER NOTE
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    Anesthesia Type:   General     Note:    Oropharynx: oropharynx clear of all foreign objects  Level of Consciousness: drowsy  Oxygen Supplementation: face mask  Level of Supplemental Oxygen (L/min / FiO2): 6  Independent Airway: airway patency satisfactory and stable  Dentition: dentition unchanged  Vital Signs Stable: post-procedure vital signs reviewed and stable  Report to RN Given: handoff report given  Patient transferred to: PACU  Comments: Neuromuscular blockade reversed with sugammadex, spontaneous respirations, adequate tidal volumes, followed commands to voice, oropharynx suctioned with soft flexible catheter, extubated atraumatically, extubated with suction, airway patent after extubation.  Oxygen via facemask at 6 liters per minute to PACU. Oxygen tubing connected to wall O2 in PACU, SpO2, NiBP, and EKG monitors and alarms on and functioning, Carla Hugger warmer connected to patient gown, report on patient's clinical status given to PACU RN, RN questions answered.   Neuromuscular blockade reversed with sugammadex, spontaneous respirations, adequate tidal volumes, followed commands to voice, oropharynx suctioned with soft flexible catheter, extubated atraumatically, extubated with suction, airway patent after extubation.  Oxygen via facemask at 6 liters per minute to PACU. Oxygen tubing connected to wall O2 in PACU, SpO2, NiBP, and EKG monitors and alarms on and functioning, Carla Hugger warmer connected to patient gown, report on patient's clinical status given to PACU RN, RN questions answered.     Handoff Report: Identifed the Patient, Identified the Reponsible Provider, Reviewed the pertinent medical history, Discussed the surgical course, Reviewed Intra-OP anesthesia mangement and issues during anesthesia,  Set expectations for post-procedure period and Allowed opportunity for questions and acknowledgement of understanding      Vitals:  Vitals Value Taken Time   /94    Temp 97.3    Pulse 59 12/21/21 1021   Resp 15 12/21/21 1021   SpO2 100 % 12/21/21 1021   Vitals shown include unvalidated device data.    Electronically Signed By: Christine Marie Volp Hodgkins, CRNA, APRN CRNA  December 21, 2021  10:22 AM

## 2021-12-21 NOTE — BRIEF OP NOTE
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]   Post-operative diagnosis As above   Procedure: Procedure(s):  Endoscopic endonasal resection of suprasellar mass   Surgeon(s): Surgeon(s) and Role:     * Roberto Pickens MD - Primary     * Eamon Davis MD   Estimated blood loss: * No values recorded between 12/21/2021  8:32 AM and 12/21/2021 10:06 AM *    Specimens: ID Type Source Tests Collected by Time Destination   1 : SUPRASELLAR MASS Tissue Brain SURGICAL PATHOLOGY EXAM Roberto Pickens MD 12/21/2021  9:08 AM    2 : SUPRASELLAR MASS Tissue Brain SURGICAL PATHOLOGY EXAM Roberto Pickens MD 12/21/2021  9:21 AM    3 : SUPRASELLAR MASS Tissue Brain SURGICAL PATHOLOGY EXAM Roberto Pickens MD 12/21/2021  9:34 AM       Findings: See op note

## 2021-12-22 ENCOUNTER — APPOINTMENT (OUTPATIENT)
Dept: PHYSICAL THERAPY | Facility: CLINIC | Age: 66
DRG: 614 | End: 2021-12-22
Attending: NURSE PRACTITIONER
Payer: COMMERCIAL

## 2021-12-22 ENCOUNTER — APPOINTMENT (OUTPATIENT)
Dept: MRI IMAGING | Facility: CLINIC | Age: 66
DRG: 614 | End: 2021-12-22
Attending: NURSE PRACTITIONER
Payer: COMMERCIAL

## 2021-12-22 ENCOUNTER — APPOINTMENT (OUTPATIENT)
Dept: OCCUPATIONAL THERAPY | Facility: CLINIC | Age: 66
DRG: 614 | End: 2021-12-22
Attending: NURSE PRACTITIONER
Payer: COMMERCIAL

## 2021-12-22 LAB
CORTIS SERPL-MCNC: 41.9 UG/DL (ref 4–22)
FSH SERPL-ACNC: 1 IU/L
GH SERPL-MCNC: 0.6 UG/L
GLUCOSE BLDC GLUCOMTR-MCNC: 109 MG/DL (ref 70–99)
LH SERPL-ACNC: <0.2 IU/L
PROLACTIN SERPL-MCNC: 10 UG/L (ref 3–27)
SHBG SERPL-SCNC: 109 NMOL/L (ref 30–135)
SODIUM SERPL-SCNC: 136 MMOL/L (ref 133–144)
SODIUM SERPL-SCNC: 137 MMOL/L (ref 133–144)
T4 FREE SERPL-MCNC: 0.73 NG/DL (ref 0.76–1.46)
T4 SERPL-MCNC: 6.1 UG/DL (ref 4.5–13.9)
TSH SERPL DL<=0.005 MIU/L-ACNC: 0.31 MU/L (ref 0.4–4)

## 2021-12-22 PROCEDURE — 97535 SELF CARE MNGMENT TRAINING: CPT | Mod: GO

## 2021-12-22 PROCEDURE — 250N000011 HC RX IP 250 OP 636: Performed by: NURSE PRACTITIONER

## 2021-12-22 PROCEDURE — 97530 THERAPEUTIC ACTIVITIES: CPT | Mod: GO

## 2021-12-22 PROCEDURE — 84403 ASSAY OF TOTAL TESTOSTERONE: CPT | Performed by: NURSE PRACTITIONER

## 2021-12-22 PROCEDURE — 84295 ASSAY OF SERUM SODIUM: CPT | Performed by: NURSE PRACTITIONER

## 2021-12-22 PROCEDURE — 83003 ASSAY GROWTH HORMONE (HGH): CPT | Performed by: NURSE PRACTITIONER

## 2021-12-22 PROCEDURE — 83002 ASSAY OF GONADOTROPIN (LH): CPT | Performed by: NURSE PRACTITIONER

## 2021-12-22 PROCEDURE — 255N000002 HC RX 255 OP 636: Performed by: NEUROLOGICAL SURGERY

## 2021-12-22 PROCEDURE — 84305 ASSAY OF SOMATOMEDIN: CPT | Performed by: NURSE PRACTITIONER

## 2021-12-22 PROCEDURE — 120N000001 HC R&B MED SURG/OB

## 2021-12-22 PROCEDURE — 84436 ASSAY OF TOTAL THYROXINE: CPT | Performed by: NURSE PRACTITIONER

## 2021-12-22 PROCEDURE — 82533 TOTAL CORTISOL: CPT | Performed by: NURSE PRACTITIONER

## 2021-12-22 PROCEDURE — 258N000003 HC RX IP 258 OP 636: Performed by: NURSE PRACTITIONER

## 2021-12-22 PROCEDURE — A9585 GADOBUTROL INJECTION: HCPCS | Performed by: NEUROLOGICAL SURGERY

## 2021-12-22 PROCEDURE — 36415 COLL VENOUS BLD VENIPUNCTURE: CPT | Performed by: NURSE PRACTITIONER

## 2021-12-22 PROCEDURE — 70553 MRI BRAIN STEM W/O & W/DYE: CPT

## 2021-12-22 PROCEDURE — 84146 ASSAY OF PROLACTIN: CPT | Performed by: NURSE PRACTITIONER

## 2021-12-22 PROCEDURE — 83001 ASSAY OF GONADOTROPIN (FSH): CPT | Performed by: NURSE PRACTITIONER

## 2021-12-22 PROCEDURE — 97110 THERAPEUTIC EXERCISES: CPT | Mod: GP

## 2021-12-22 PROCEDURE — 97530 THERAPEUTIC ACTIVITIES: CPT | Mod: GP

## 2021-12-22 PROCEDURE — 97161 PT EVAL LOW COMPLEX 20 MIN: CPT | Mod: GP

## 2021-12-22 PROCEDURE — 84439 ASSAY OF FREE THYROXINE: CPT | Performed by: NURSE PRACTITIONER

## 2021-12-22 PROCEDURE — 97165 OT EVAL LOW COMPLEX 30 MIN: CPT | Mod: GO

## 2021-12-22 PROCEDURE — 84270 ASSAY OF SEX HORMONE GLOBUL: CPT | Performed by: NURSE PRACTITIONER

## 2021-12-22 PROCEDURE — 97116 GAIT TRAINING THERAPY: CPT | Mod: GP

## 2021-12-22 PROCEDURE — 250N000013 HC RX MED GY IP 250 OP 250 PS 637: Performed by: NURSE PRACTITIONER

## 2021-12-22 PROCEDURE — 84443 ASSAY THYROID STIM HORMONE: CPT | Performed by: NURSE PRACTITIONER

## 2021-12-22 RX ORDER — GADOBUTROL 604.72 MG/ML
6 INJECTION INTRAVENOUS ONCE
Status: COMPLETED | OUTPATIENT
Start: 2021-12-22 | End: 2021-12-22

## 2021-12-22 RX ADMIN — Medication 600 MG: at 09:36

## 2021-12-22 RX ADMIN — HYDROCORTISONE SODIUM SUCCINATE 50 MG: 100 INJECTION, POWDER, FOR SOLUTION INTRAMUSCULAR; INTRAVENOUS at 06:12

## 2021-12-22 RX ADMIN — HYDROCORTISONE SODIUM SUCCINATE 25 MG: 100 INJECTION, POWDER, FOR SOLUTION INTRAMUSCULAR; INTRAVENOUS at 14:57

## 2021-12-22 RX ADMIN — POLYETHYLENE GLYCOL 3350 17 G: 17 POWDER, FOR SOLUTION ORAL at 09:54

## 2021-12-22 RX ADMIN — CEFAZOLIN 1 G: 1 INJECTION, POWDER, FOR SOLUTION INTRAMUSCULAR; INTRAVENOUS at 09:35

## 2021-12-22 RX ADMIN — SODIUM CHLORIDE: 9 INJECTION, SOLUTION INTRAVENOUS at 04:49

## 2021-12-22 RX ADMIN — GADOBUTROL 6 ML: 604.72 INJECTION INTRAVENOUS at 09:16

## 2021-12-22 RX ADMIN — SENNOSIDES AND DOCUSATE SODIUM 1 TABLET: 50; 8.6 TABLET ORAL at 09:36

## 2021-12-22 RX ADMIN — ACETAMINOPHEN 975 MG: 325 TABLET, FILM COATED ORAL at 06:12

## 2021-12-22 RX ADMIN — SENNOSIDES AND DOCUSATE SODIUM 1 TABLET: 50; 8.6 TABLET ORAL at 20:18

## 2021-12-22 RX ADMIN — CEFAZOLIN 1 G: 1 INJECTION, POWDER, FOR SOLUTION INTRAMUSCULAR; INTRAVENOUS at 00:14

## 2021-12-22 RX ADMIN — MULTIPLE VITAMINS W/ MINERALS TAB 1 TABLET: TAB at 09:35

## 2021-12-22 RX ADMIN — ACETAMINOPHEN 975 MG: 325 TABLET, FILM COATED ORAL at 14:57

## 2021-12-22 RX ADMIN — HYDROCORTISONE SODIUM SUCCINATE 25 MG: 100 INJECTION, POWDER, FOR SOLUTION INTRAMUSCULAR; INTRAVENOUS at 22:31

## 2021-12-22 RX ADMIN — ACETAMINOPHEN 975 MG: 325 TABLET, FILM COATED ORAL at 22:30

## 2021-12-22 ASSESSMENT — ACTIVITIES OF DAILY LIVING (ADL)
ADLS_ACUITY_SCORE: 6

## 2021-12-22 NOTE — OP NOTE
Procedure Date: 12/21/2021    PREOPERATIVE DIAGNOSIS:  Sellar mass.    POSTOPERATIVE DIAGNOSIS:  Sellar mass, probable pituitary adenoma.    PROCEDURES:    1.  Stealth-guided transnasal endoscopic resection pituitary sellar mass.  2.  Posterior Septectomy.  3.  Bilateral transnasal approach with bilateral sphenoidotomies with removal of contents.  4.  Bilateral lateralization of middle inferior turbinates.  5.  Resection of pituitary mass per Neurosurgery.  6.  Repair of dural defect with AlloDerm inlay and onlay technique and Vistaseal sealant.  7.  Repair of posterior septal defect with bilateral middle turbinate flaps.    SURGEON:  Susan Knutson MD.    CO-SURGEON:  Roberto Pickens MD    ANESTHESIA:  General endotracheal.    ESTIMATED BLOOD LOSS:  10 mL for my portion.    OPERATIVE INDICATIONS AND CONSENT:  This 66-year-old female who had visual changes and upon imaging was noted to have a sellar mass extending into the suprasellar.  This is probably affecting the chiasm and thus resection was needed.    After explaining risks and benefits to the patient, she signed consent accordingly.    DESCRIPTION OF PROCEDURE:  Preoperatively, the patient underwent a CT and a Stealth imaging for image guidance.  She was then brought to the operating room and placed supine on the operating table where general anesthesia by endotracheal intubation was induced.  The patient was then placed in the [place] head frame and calibrated with the Stealth guidance to within 1 mm of air.  At this time, a timeout was called.  All surgical sites were identified.    At this point, approximately 8 mL of 1% Xylocaine with 1:100,000 epinephrine were infiltrated into the septum as well as the lateral nasal walls.  Topical oxymetazoline was placed as well.  The patient was then prepped and draped in the usual sterile fashion.    Using a 0-degree endoscope, both nares were entered and the patient underwent a posterior septectomy.  Following that, the  patient underwent lateralization of the middle inferior turbinates and the ostia bilateral to the sphenoid sinuses were confirmed with Stealth guidance.  The patient then underwent bilateral sphenoidotomy with removal of contents, as well as removal of the sphenoid rostrum.  The opening was then enlarged with instrumentation and then Dr. Pickens entered the case to proceed with resection of the tumor.    After resection of the tumor, there was no significant CSF leak; thus, repair was to the dura using an inlay and onlay technique of AlloDerm.  This was placed after rehydration and just a small bit of Gelfoam was placed within the sella to keep this elevated.  Tisseel sealant was then used to the site.    After filling of the site, the area was irrigated vigorously and Surgiflo hemostatic agent was applied and then bilateral middle turbinate flaps were reapproximated medially to repair the septal defect.  These were retained with further placement of Surgiflo and then bilateral placement of Silastic splints.  The Silastic was secured to the anterior septum with a 3-0 nylon.    The oropharynx was suctioned clear, stomach contents aspirated, and procedure completed.  It must be noted the patient did receive perioperative antibiotics.    Having tolerated this, the patient was then taken out of [place] headar fully awakened, extubated, and taken to recovery room in stable condition.     Eamon Davis MD        D: 2021   T: 2021   MT: [id number]    Name:     CELIA SCHAEFER  MRN:      [id number]        Account:        [id number]   :      1955           Procedure Date: 2021     Document: [id number]

## 2021-12-22 NOTE — PLAN OF CARE
Pt transferred from ICU. VS and neuros checked with transferring RN - VSS on RA, neuros intact. Alert and oriented x4. Perez patent. Packing in nose CDI.

## 2021-12-22 NOTE — PROVIDER NOTIFICATION
"Paged Tony, \"FYI cortisol serum came back elevated 41.9. An increase from the one drawn a month ago. Unsure if I am suppose to notify regarding this\"  "

## 2021-12-22 NOTE — PROGRESS NOTES
"Neurosurgery Progress     Dx:     POD #1 s/p endoscopic endonasal transsphenoidal resection of suprasellar mass.    Neuro stable.     TODAY'S PLAN:     Ms. Thompson is doing well. No sign of CSF leak. Will continue working with therapy.      In the event that patient's symptoms worsen or change we would appreciate being contacted. We did discuss signs of a worsening problem that she should seek being evaluated.     We did review the above information with the patient whom agrees with the plan and did verbalize understanding.   ________________________________________________________________     Ms. Thompson overall feels well and denies any significant discomfort. Tolerating regular diet without n/v. Up ambulating with assist.     Urine output 25ml/hr x2    /58 (BP Location: Right arm, Patient Position: Standing)   Pulse 118   Temp 99  F (37.2  C) (Axillary)   Resp 16   Ht 5' 4\" (1.626 m)   Wt 123 lb (55.8 kg)   SpO2 99%   BMI 21.11 kg/m       Patient examined in room 0741-02 with pt present. She appears comfortable and in no apparent distress. She is moving all of her extremities well.   CN II-XII intact, alert and appropriate with conversation. Follows all commands.   Bilateral upper and lower extremities with appropriate strength. Sensation is also intact throughout. She does have an acceptable post-operative range of motion.   -tea kettle sign    Calves soft and non-tender.     All pertinent labs and updated imaging reviewed in EPIC.     Results for CELIA THOMPSON (MRN [id number]) as of 12/22/2021 11:50   Ref. Range 12/22/2021 06:27   Sodium Latest Ref Range: 133 - 144 mmol/L 137       MRI BRAIN WITHOUT AND WITH CONTRAST December 22, 2021 9:18 AM    Expected immediate postoperative change following  transphenoidal sellar/suprasellar tumor resection. A partially  collapsed tumor/resection cavity is present with peripheral  enhancement abutting the optic chiasm.    Wade Smith PA-C   Neurosurgery "   [phone] (P)     Reviewed with Dr. Pickens.

## 2021-12-22 NOTE — PROVIDER NOTIFICATION
MD Notification    Notified Person: MD    Notified Person Name: SURJIT Allison: Neurosurg    Notification Date/Time:12/21 2010    Notification Interaction: Paged    Purpose of Notification: NA+ 134, no changes in condition.      Orders Received: No response back    Comments:

## 2021-12-22 NOTE — PROVIDER NOTIFICATION
"Paged Geoffery with neurosurgery, \"FYI pt urine output 25ml/hr x2. Orders state to notify. Also FYI pt now has mild HA max pain level 2/10. Going down for MRI now--overnight there was concerns for CSF leak.\"    Addendum: No change in POC per Geoffery  "

## 2021-12-22 NOTE — PROGRESS NOTES
"   12/22/21 1346   Quick Adds   Type of Visit Initial Occupational Therapy Evaluation   Living Environment   People in home spouse   Current Living Arrangements house   Home Accessibility stairs to enter home;stairs within home   Number of Stairs, Main Entrance 3   Stair Railings, Main Entrance none   Number of Stairs, Within Home, Primary greater than 10 stairs  (to the basement)   Transportation Anticipated family or friend will provide   Living Environment Comments 1 level living - SO can do laundry downstairs (has been avoiding lately).   Self-Care   Usual Activity Tolerance good   Current Activity Tolerance fair   Equipment Currently Used at Home shower chair   Activity/Exercise/Self-Care Comment Pt indep with functional mobility and self-cares at baseline.  is retired and able to be home 24/7 if needed. Pt reports she would have \"good and bad days prior to surgery\" and  would assist as needed.    Disability/Function   Fall history within last six months yes   Number of times patient has fallen within last six months 3   Change in Functional Status Since Onset of Current Illness/Injury yes   General Information   Onset of Illness/Injury or Date of Surgery 12/21/21   Referring Physician Dr. Pickens   Patient/Family Therapy Goal Statement (OT) return home   Additional Occupational Profile Info/Pertinent History of Current Problem POD #1 s/p endoscopic endonasal transsphenoidal resection of suprasellar mass.   Existing Precautions/Restrictions fall;other (see comments)  (Nasal; no not blowing and no straws)   Cognitive Status Examination   Orientation Status orientation to person, place and time   Affect/Mental Status (Cognitive) WNL   Follows Commands follows one-step commands;75-90% accuracy   Visual Perception   Visual Impairment/Limitations blurry vision   Impact of Vision Impairment on Function (Vision) Pt reports vision to be \"foggy\". Declines double vision. Tracking intact and convergence intact. " Pt reports peripheral vision was challanging before surgery but feels it is better now.    Sensory   Sensory Quick Adds No deficits were identified   Integumentary/Edema   Integumentary/Edema other (describe)   Integumentary/Edema Comments Swelling around eye socks/nose post surgery   Posture   Posture not impaired   Range of Motion Comprehensive   General Range of Motion bilateral upper extremity ROM WNL   Strength Comprehensive (MMT)   General Manual Muscle Testing (MMT) Assessment upper extremity strength deficits identified   Comment, General Manual Muscle Testing (MMT) Assessment Generalized weakness, 4/5   Coordination   Upper Extremity Coordination No deficits were identified   Bed Mobility   Bed Mobility supine-sit   Supine-Sit Bude (Bed Mobility) supervision   Transfers   Transfers bed-chair transfer   Transfer Comments CGA   Activities of Daily Living   BADL Assessment grooming;lower body dressing   Lower Body Dressing Assessment   Bude Level (Lower Body Dressing) supervision;set up   Grooming Assessment   Bude Level (Grooming) supervision   Position (Grooming) sink side   Clinical Impression   Criteria for Skilled Therapeutic Interventions Met (OT) yes;meets criteria;skilled treatment is necessary   OT Diagnosis impaired ADLs and functional mobility   OT Problem List-Impairments impacting ADL problems related to;activity tolerance impaired;mobility;strength   Assessment of Occupational Performance 1-3 Performance Deficits   Identified Performance Deficits functional mobility, home management   Planned Therapy Interventions (OT) ADL retraining;cognition;progressive activity/exercise;home program guidelines;visual perception;transfer training   Clinical Decision Making Complexity (OT) low complexity   Therapy Frequency (OT) Daily   Predicted Duration of Therapy 2   Risk & Benefits of therapy have been explained evaluation/treatment results reviewed;care plan/treatment goals reviewed    OT Discharge Planning    OT Discharge Recommendation (DC Rec) Home with assist;home with outpatient occupational therapy   OT Rationale for DC Rec Pt below baseline with functional mobility and self-cares. Pt deconditioned from surgery and presents with overall weakness. Further assessment for vision and cognition warrented. Pt has assist from  at home as needed, likely needed for IADLs such as laundry, cooking, cleaning, etc.    Total Evaluation Time (Minutes)   Total Evaluation Time (Minutes) 10

## 2021-12-22 NOTE — PLAN OF CARE
Diana is here POD #0 Endoscopic endonasal resection of suprasellar mass.    A&Ox4, Neuros intact ex blurry vision. Bilateral strength, 4/5,  VSS on RA, SBP within limits of <140.  Perez patent.  NS 75ml/hr.  Up with 2 A.  Perez patent, Hrly UO recheck within limits.  Will start 24hr urine at 0000, Na+ 134: MD notified. q6hr Na+ checks  Denies nausea. Passed flatus, hypoactive bowel sounds  Nasal packing remains intact

## 2021-12-22 NOTE — PROGRESS NOTES
Pt here with POD1 endoscopic endonasal resection of suprasellar mass. A&Ox4. Neuros intact, ex blurry vision. VSS on RA, SBP <140. Tele NSR. Clear liquid diet, thin liquids. Takes pills whole with water. Perez patent w/hourly urine output. On 24 hour urine collection started at 0000. Q6H Na lab draw. Up with A2 GB/W. Denies pain. Nasal packing in place and CDI. R PIV running NS @ 75mL/hr until adequate oral intake. Pt scoring green on the Aggression Stop Light Tool. Plan for MRI. Discharge pending.

## 2021-12-23 ENCOUNTER — APPOINTMENT (OUTPATIENT)
Dept: OCCUPATIONAL THERAPY | Facility: CLINIC | Age: 66
DRG: 614 | End: 2021-12-23
Attending: NEUROLOGICAL SURGERY
Payer: COMMERCIAL

## 2021-12-23 ENCOUNTER — APPOINTMENT (OUTPATIENT)
Dept: PHYSICAL THERAPY | Facility: CLINIC | Age: 66
DRG: 614 | End: 2021-12-23
Attending: NEUROLOGICAL SURGERY
Payer: COMMERCIAL

## 2021-12-23 LAB
GLUCOSE BLDC GLUCOMTR-MCNC: 105 MG/DL (ref 70–99)
PATH REPORT.COMMENTS IMP SPEC: NORMAL
PATH REPORT.FINAL DX SPEC: NORMAL
PATH REPORT.GROSS SPEC: NORMAL
PATH REPORT.INTRAOP OBS SPEC DOC: NORMAL
PATH REPORT.MICROSCOPIC SPEC OTHER STN: NORMAL
PHOTO IMAGE: NORMAL
SODIUM SERPL-SCNC: 141 MMOL/L (ref 133–144)
SODIUM SERPL-SCNC: 141 MMOL/L (ref 133–144)
SODIUM SERPL-SCNC: 142 MMOL/L (ref 133–144)
SODIUM SERPL-SCNC: 142 MMOL/L (ref 133–144)
SP GR UR STRIP: 1.01 (ref 1–1.03)
TESTOST FREE SERPL-MCNC: 0.17 NG/DL
TESTOST SERPL-MCNC: 23 NG/DL (ref 8–60)

## 2021-12-23 PROCEDURE — 84295 ASSAY OF SERUM SODIUM: CPT | Performed by: NURSE PRACTITIONER

## 2021-12-23 PROCEDURE — 250N000011 HC RX IP 250 OP 636: Performed by: NURSE PRACTITIONER

## 2021-12-23 PROCEDURE — 97535 SELF CARE MNGMENT TRAINING: CPT | Mod: GO

## 2021-12-23 PROCEDURE — 36415 COLL VENOUS BLD VENIPUNCTURE: CPT | Performed by: NURSE PRACTITIONER

## 2021-12-23 PROCEDURE — 99207 PR CONSULT E&M CHANGED TO INITIAL LEVEL: CPT | Performed by: HOSPITALIST

## 2021-12-23 PROCEDURE — 97116 GAIT TRAINING THERAPY: CPT | Mod: GP

## 2021-12-23 PROCEDURE — 120N000001 HC R&B MED SURG/OB

## 2021-12-23 PROCEDURE — 99221 1ST HOSP IP/OBS SF/LOW 40: CPT | Performed by: HOSPITALIST

## 2021-12-23 PROCEDURE — 97530 THERAPEUTIC ACTIVITIES: CPT | Mod: GO

## 2021-12-23 PROCEDURE — 250N000013 HC RX MED GY IP 250 OP 250 PS 637: Performed by: NURSE PRACTITIONER

## 2021-12-23 PROCEDURE — 81003 URINALYSIS AUTO W/O SCOPE: CPT | Performed by: NURSE PRACTITIONER

## 2021-12-23 PROCEDURE — 82530 CORTISOL FREE: CPT | Performed by: NURSE PRACTITIONER

## 2021-12-23 PROCEDURE — 97530 THERAPEUTIC ACTIVITIES: CPT | Mod: GP

## 2021-12-23 PROCEDURE — 250N000011 HC RX IP 250 OP 636: Performed by: PHYSICIAN ASSISTANT

## 2021-12-23 RX ORDER — DIPHENHYDRAMINE HYDROCHLORIDE 50 MG/ML
25 INJECTION INTRAMUSCULAR; INTRAVENOUS EVERY 6 HOURS PRN
Status: DISCONTINUED | OUTPATIENT
Start: 2021-12-23 | End: 2021-12-24 | Stop reason: HOSPADM

## 2021-12-23 RX ORDER — DIPHENHYDRAMINE HCL 25 MG
25 CAPSULE ORAL EVERY 6 HOURS PRN
Status: DISCONTINUED | OUTPATIENT
Start: 2021-12-23 | End: 2021-12-24 | Stop reason: HOSPADM

## 2021-12-23 RX ADMIN — MULTIPLE VITAMINS W/ MINERALS TAB 1 TABLET: TAB at 08:43

## 2021-12-23 RX ADMIN — ACETAMINOPHEN 975 MG: 325 TABLET, FILM COATED ORAL at 21:09

## 2021-12-23 RX ADMIN — DIPHENHYDRAMINE HYDROCHLORIDE 25 MG: 50 INJECTION INTRAMUSCULAR; INTRAVENOUS at 19:01

## 2021-12-23 RX ADMIN — SENNOSIDES AND DOCUSATE SODIUM 1 TABLET: 50; 8.6 TABLET ORAL at 08:43

## 2021-12-23 RX ADMIN — Medication 600 MG: at 08:43

## 2021-12-23 RX ADMIN — HYDROXYZINE HYDROCHLORIDE 10 MG: 10 TABLET ORAL at 23:08

## 2021-12-23 RX ADMIN — ACETAMINOPHEN 975 MG: 325 TABLET, FILM COATED ORAL at 05:18

## 2021-12-23 RX ADMIN — OXYCODONE HYDROCHLORIDE 5 MG: 5 TABLET ORAL at 01:09

## 2021-12-23 RX ADMIN — ACETAMINOPHEN 975 MG: 325 TABLET, FILM COATED ORAL at 14:14

## 2021-12-23 RX ADMIN — HYDROCORTISONE SODIUM SUCCINATE 25 MG: 100 INJECTION, POWDER, FOR SOLUTION INTRAMUSCULAR; INTRAVENOUS at 05:19

## 2021-12-23 RX ADMIN — SENNOSIDES AND DOCUSATE SODIUM 1 TABLET: 50; 8.6 TABLET ORAL at 20:31

## 2021-12-23 RX ADMIN — POLYETHYLENE GLYCOL 3350 17 G: 17 POWDER, FOR SOLUTION ORAL at 08:43

## 2021-12-23 ASSESSMENT — ACTIVITIES OF DAILY LIVING (ADL)
ADLS_ACUITY_SCORE: 6
ADLS_ACUITY_SCORE: 6
ADLS_ACUITY_SCORE: 8
ADLS_ACUITY_SCORE: 6
ADLS_ACUITY_SCORE: 8
ADLS_ACUITY_SCORE: 6
ADLS_ACUITY_SCORE: 6
ADLS_ACUITY_SCORE: 8
ADLS_ACUITY_SCORE: 6
ADLS_ACUITY_SCORE: 8
ADLS_ACUITY_SCORE: 6
ADLS_ACUITY_SCORE: 8
ADLS_ACUITY_SCORE: 6
ADLS_ACUITY_SCORE: 8
ADLS_ACUITY_SCORE: 8
ADLS_ACUITY_SCORE: 6
ADLS_ACUITY_SCORE: 8
ADLS_ACUITY_SCORE: 8
ADLS_ACUITY_SCORE: 6
ADLS_ACUITY_SCORE: 6
ADLS_ACUITY_SCORE: 8
ADLS_ACUITY_SCORE: 8

## 2021-12-23 NOTE — PROGRESS NOTES
SURJIT Quintero 17:55    241-2 A.D.   Right hand hot, red, burning, trace swelling.     [id number] Mireya Pickens 18:28pm   741-2 A.D.   Left hand red, hot, burning sensation. trace swelling. Good pulse.     [id number] Mireya

## 2021-12-23 NOTE — PLAN OF CARE
Reason for Admission: POD#1 endoscopic endonasal transsphenoidal resection of suprasellar mass    Cognitive/Mentation: A/Ox 4  Neuros/CMS: Intact ex generalized weakness  VS: stable, RA.   Tele: NSR.  GI: BS active, + flatus, last BM today. Continent.  : Gunter. Strict I/O.  Pulmonary: LS clear.  Pain: intermittent mild head pain, managed with scheduled tylenol. Declined ice.     Drains: gunter  Skin: WDL  Activity: Assist x 1 with GB.  Diet: Regular with thin liquids. Takes pills whole.     Therapies recs: TCU vs home with OP PT/OT  Discharge: pending medical clearance    End of shift summary: On 24 hour urine collection--ends at 0000.

## 2021-12-23 NOTE — PLAN OF CARE
"Vitals: WNL  Lungs: WNL  CV: WNL  GI: WNL  Uro: Perez in place Strict I&Os to monitor for SIADH.   650 output in 3 hours. urine specific gravity sent- pending.   CMS: WNL  Neuro: Pt. Reports blurry vision. Mild intermittent headache that self resolves per pt. Scheduled Tylenol.   Skin: Dried blood in nares. - see below  Psych: WNL  MSK: Ax-1 belt   Pain: Intermittent headache \"short lasting\" and \"mild\". No intervention needed.   Labs: urine specific gravity pending.   Tests/Procedures: na   Other:    Pt. Called staff in due to sudden red, hot, and burning sensation with trace edema to left hand from fingers to wrist. Good pulses. Removed IV and alena COWAN. Hand resolving but then developed flat, dry, hot patch above the elbows bilaterally (not hive like), and blotchy hive like rash on abdomen. Order for Benadryl placed. Hand mostly resolved, abdomen resolved. Dry hot patches to elbows stable with burning sensation.  No new medications given this evening. Unknown cause of reaction.    "

## 2021-12-23 NOTE — PLAN OF CARE
Pt here with endoscopic endonasal transsphenoidal resection of suprasellar mass POD2. A&Ox4. Neuros intact ex: general weakness and mild blurred vision. VSS. Regular diet, thin liquids. Takes pills whole. Up with A1GB. Denies pain. Pt scoring green on the Aggression Stop Light Tool. Plan 24 hour urine collection completed at 0000 results pending. Oxycodone 5mg given at 0109 for pain which resolved. Discharge pending.

## 2021-12-23 NOTE — PLAN OF CARE
Reason for Admission: Endonasal transsphenoidal resection of suprasellar mass    Cognitive/Mentation: A/Ox 4  Neuros/CMS: Intact ex generalized weakness and bilateral blurred vision.  VS: Stable on RA.   Tele: NA.  GI: BS Active X 4, soft non-tender, passing flatus, last BM 12/23/2021. Continent. : . Perez Catheter r/t strict I&O.   Pulmonary: LS Clear throughout.  Pain: Denies pain. Scheduled Tylenol administered.      Drains: Perez Catheter  Skin: Intact  Activity: Assist x 1 with GBW.  Diet: Regular with Thin liquids. Takes pills whole, no straws.     Therapies recs: OT/PT  Discharge: TCU VS Home. Pending medical clearance.    End of shift summary: Blurred vision continues. Denies pain. Resting with eyes closed between cares. BM today. No straws. No nose blowing continues.

## 2021-12-23 NOTE — PROGRESS NOTES
[facility]    Neurosurgery  Daily Note    Assessment & Plan   Procedure(s):  Endoscopic endonasal resection of suprasellar mass   2 Days Post-Op  Admits to slight headache and blurry vision that has remained stable. No drainage from nares. Increased urine output overnight. Neuro intact on exam. Na 141. Most recent urine output now 400 in 4 hours last check ([id number]).       Intake/Output Summary (Last 24 hours) at 12/23/2021 1249  Last data filed at 12/23/2021 1126  Gross per 24 hour   Intake 400 ml   Output 3250 ml   Net -2850 ml         Plan:  -Advance activity as tolerated  -Continue supportive and symptomatic treatment  -Start or continue physical therapy  -Pain control measures  -Advance diet as tolerated  -Continue to monitor urine output. Perez out most likely tomorrow.   -Routine wound care  -Plans reviewed with Dr. Pickens  -Call with questions    Active Problems:    Status post transsphenoidal pituitary resection (H)      Raya Quintero    Interval History   Stable     Physical Exam   Temp: 98.7  F (37.1  C) Temp src: Oral BP: 134/68 Pulse: 68   Resp: 16 SpO2: 98 % O2 Device: None (Room air)    Vitals:    12/21/21 0556   Weight: 123 lb (55.8 kg)     Vital Signs with Ranges  Temp:  [97.2  F (36.2  C)-98.8  F (37.1  C)] 98.7  F (37.1  C)  Pulse:  [65-99] 68  Resp:  [16-18] 16  BP: (125-136)/(56-68) 134/68  SpO2:  [98 %-100 %] 98 %  I/O last 3 completed shifts:  In: -   Out: 3070 [Urine:3070]    Awake, alert, oriented x 3   II-XII grossly intact  5/5 motor strength throughout   Negative clonus    Medications     lactated ringers Stopped (12/21/21 1237)        acetaminophen  975 mg Oral Q8H     calcium carbonate  600 mg Oral Daily     multivitamin w/minerals  1 tablet Oral Daily     polyethylene glycol  17 g Oral Daily     senna-docusate  1 tablet Oral BID     sodium chloride (PF)  3 mL Intracatheter Q8H       Plans discussed with Dr. Pickens who was in agreement with sarah Quintero  TOMAS  [facility] Neurosurgery  [facility]  [address] [place] [address]  [address] [place] [id number]    Tel [phone]  Pager [phone]

## 2021-12-24 ENCOUNTER — APPOINTMENT (OUTPATIENT)
Dept: PHYSICAL THERAPY | Facility: CLINIC | Age: 66
DRG: 614 | End: 2021-12-24
Attending: NEUROLOGICAL SURGERY
Payer: COMMERCIAL

## 2021-12-24 ENCOUNTER — APPOINTMENT (OUTPATIENT)
Dept: OCCUPATIONAL THERAPY | Facility: CLINIC | Age: 66
DRG: 614 | End: 2021-12-24
Attending: NEUROLOGICAL SURGERY
Payer: COMMERCIAL

## 2021-12-24 VITALS
RESPIRATION RATE: 16 BRPM | HEIGHT: 64 IN | BODY MASS INDEX: 21 KG/M2 | OXYGEN SATURATION: 95 % | SYSTOLIC BLOOD PRESSURE: 143 MMHG | WEIGHT: 123 LBS | HEART RATE: 70 BPM | TEMPERATURE: 97.4 F | DIASTOLIC BLOOD PRESSURE: 73 MMHG

## 2021-12-24 LAB
BASOPHILS # BLD AUTO: 0 10E3/UL (ref 0–0.2)
BASOPHILS NFR BLD AUTO: 0 %
EOSINOPHIL # BLD AUTO: 0.1 10E3/UL (ref 0–0.7)
EOSINOPHIL NFR BLD AUTO: 1 %
ERYTHROCYTE [DISTWIDTH] IN BLOOD BY AUTOMATED COUNT: 13.6 % (ref 10–15)
HCT VFR BLD AUTO: 33.2 % (ref 35–47)
HGB BLD-MCNC: 10.8 G/DL (ref 11.7–15.7)
IGF-I BLD-MCNC: 23 NG/ML (ref 30–235)
IMM GRANULOCYTES # BLD: 0 10E3/UL
IMM GRANULOCYTES NFR BLD: 0 %
LYMPHOCYTES # BLD AUTO: 1.7 10E3/UL (ref 0.8–5.3)
LYMPHOCYTES NFR BLD AUTO: 19 %
MCH RBC QN AUTO: 30.7 PG (ref 26.5–33)
MCHC RBC AUTO-ENTMCNC: 32.5 G/DL (ref 31.5–36.5)
MCV RBC AUTO: 94 FL (ref 78–100)
MONOCYTES # BLD AUTO: 0.7 10E3/UL (ref 0–1.3)
MONOCYTES NFR BLD AUTO: 8 %
NEUTROPHILS # BLD AUTO: 6.3 10E3/UL (ref 1.6–8.3)
NEUTROPHILS NFR BLD AUTO: 72 %
NRBC # BLD AUTO: 0 10E3/UL
NRBC BLD AUTO-RTO: 0 /100
PLATELET # BLD AUTO: 269 10E3/UL (ref 150–450)
RBC # BLD AUTO: 3.52 10E6/UL (ref 3.8–5.2)
SODIUM SERPL-SCNC: 139 MMOL/L (ref 133–144)
SODIUM SERPL-SCNC: 141 MMOL/L (ref 133–144)
SODIUM SERPL-SCNC: 141 MMOL/L (ref 133–144)
WBC # BLD AUTO: 8.8 10E3/UL (ref 4–11)

## 2021-12-24 PROCEDURE — 36415 COLL VENOUS BLD VENIPUNCTURE: CPT | Performed by: NURSE PRACTITIONER

## 2021-12-24 PROCEDURE — 99232 SBSQ HOSP IP/OBS MODERATE 35: CPT | Performed by: HOSPITALIST

## 2021-12-24 PROCEDURE — 84295 ASSAY OF SERUM SODIUM: CPT | Performed by: NURSE PRACTITIONER

## 2021-12-24 PROCEDURE — 97116 GAIT TRAINING THERAPY: CPT | Mod: GP

## 2021-12-24 PROCEDURE — 97535 SELF CARE MNGMENT TRAINING: CPT | Mod: GO

## 2021-12-24 PROCEDURE — 97750 PHYSICAL PERFORMANCE TEST: CPT | Mod: GP

## 2021-12-24 PROCEDURE — 85025 COMPLETE CBC W/AUTO DIFF WBC: CPT | Performed by: HOSPITALIST

## 2021-12-24 PROCEDURE — 250N000013 HC RX MED GY IP 250 OP 250 PS 637: Performed by: NURSE PRACTITIONER

## 2021-12-24 RX ORDER — AMOXICILLIN 250 MG
1 CAPSULE ORAL 2 TIMES DAILY
Qty: 20 TABLET | Refills: 0 | Status: SHIPPED | OUTPATIENT
Start: 2021-12-24 | End: 2023-02-02

## 2021-12-24 RX ORDER — OXYCODONE HYDROCHLORIDE 5 MG/1
5 TABLET ORAL EVERY 4 HOURS PRN
Qty: 30 TABLET | Refills: 0 | Status: SHIPPED | OUTPATIENT
Start: 2021-12-24 | End: 2023-02-02

## 2021-12-24 RX ADMIN — ACETAMINOPHEN 975 MG: 325 TABLET, FILM COATED ORAL at 06:03

## 2021-12-24 RX ADMIN — MULTIPLE VITAMINS W/ MINERALS TAB 1 TABLET: TAB at 09:13

## 2021-12-24 RX ADMIN — Medication 600 MG: at 09:13

## 2021-12-24 RX ADMIN — SENNOSIDES AND DOCUSATE SODIUM 1 TABLET: 50; 8.6 TABLET ORAL at 09:13

## 2021-12-24 RX ADMIN — POLYETHYLENE GLYCOL 3350 17 G: 17 POWDER, FOR SOLUTION ORAL at 09:13

## 2021-12-24 RX ADMIN — OXYCODONE HYDROCHLORIDE 5 MG: 5 TABLET ORAL at 04:16

## 2021-12-24 ASSESSMENT — ACTIVITIES OF DAILY LIVING (ADL)
ADLS_ACUITY_SCORE: 8
ADLS_ACUITY_SCORE: 6
ADLS_ACUITY_SCORE: 8

## 2021-12-24 NOTE — PROVIDER NOTIFICATION
"Web page sent to SURJIT Mcallister:    \"Pt has redness and warm/burning feeling in her hands. When assessing her I noticed she has a flat erythematous confluent rash on her chest. PRN Benadryl not available yet. Please advise.\"    Received call back from Aida who rec paging hospitalist. She will put in consult.    Web page sent to Marie Buitrago MD.       \"Pt has redness and warm/burning feeling in her hands. When assessing her noticed she has a flat erythematous confluent rash on her chest. Benadryl not available yet. Hospitalist consult req by Neurosurg.\"  "

## 2021-12-24 NOTE — PROGRESS NOTES
MD Notification    Notified Person: MD    Notified Person Name: Enu    Notification Date/Time: 12/24/21 12:24 PM     Notification Interaction: Talked with MD    Purpose of Notification: 741-2: neuro signed off and ok to discharge. Ok with you? Rebeca HUTCHINSON [id number]    Orders Received:     Comments:

## 2021-12-24 NOTE — PLAN OF CARE
Pt here with endonasal transsphenoidal resection of suprasellar mass POD2. A&Ox4. Neuros intact ex generalized weakness and headaches. VSS on RA. Regular diet, thin liquids- no straws. Takes pills whole. Up with A1/SBA. Scheduled Tylenol and PRN Oxy 5mg x 1 given for headaches. Perez catheter r/t strict I&O. Pt scoring green on the Aggression Stop Light Tool. Discharge to TCU vs home pending.

## 2021-12-24 NOTE — PROGRESS NOTES
"Neurosurgery Progress     Dx:     POD #3 s/p endoscopic endonasal resection of suprasellar mass.     Neuro stable.     TODAY'S PLAN:     Overall Ms. Thompson is doing well. She would like to work with physical therapy and to have her  present during a therapy session. We certainly anticipate discharge to home today or tomorrow, 12/25/20221. Her follow-up appointments have been made.     In the interim:    -Will have nursing remove gunter.   -Advance activity as tolerated.  -Continue supportive and symptomatic treatment.  -Continue physical therapy.  -Pain control measures.  -Advance diet as tolerated.  -Routine wound care.  -Plans reviewed with Dr. Celio Ortiz, and the RN.  -Call with questions.       In the event that patient's symptoms worsen or change we would appreciate being contacted. We did discuss signs of a worsening problem that she should seek being evaluated.     We did review the above information with the patient whom agrees with the plan and did verbalize understanding.   ________________________________________________________________     Ms. Thompson overall feels well and denies any significant discomfort. Tolerating regular diet without n/v. Up ambulating with assist.    BP (!) 140/77 (BP Location: Left arm)   Pulse 62   Temp 97.6  F (36.4  C) (Oral)   Resp 16   Ht 5' 4\" (1.626 m)   Wt 123 lb (55.8 kg)   SpO2 96%   BMI 21.11 kg/m       Pt in bed. She appears comfortable and in no apparent distress, moving all extremities.   CN II-XII intact, alert and appropriate with conversation and following commands.   Able to name 3/3 objects.   Finger to nose slow and accurate.   Rapid hand movements intact.   Absent for upper and lower extremity drift.   Bilateral upper and lower extremities 5/5. DTR's wnl. Sensation intact throughout. Normal ROM.   Calves soft and non-tender.     All pertinent labs and updated imaging reviewed in EPIC.     Wade Smith PA-C   Neurosurgery   [phone] (P) "     Reviewed with Dr. Pickens.

## 2021-12-24 NOTE — CONSULTS
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 I was asked to see the patient for acute rash tonight, 12/23.    Rash: Cause unclear. The rash on her fingers could be consistent with early Raynaud's Syndrome, vs possible contact dermatitis from an unknown irritant. There is also a macular rash over the chest and upper arms which makes a unifying diagnosis difficult. It could also be due to a contact dermatitis. Drug allergy is also possible; noting she last received Ancef yesterday, 12/22 in the morning, nearly 24 hours ago; this could be a possible culprit. In any event, she feels well otherwise and there is no evident angioedema at this time or evidence of systemic involvement. We will monitor her very closely overnight.    -- Will try to elevate the hands overnight and demarcate the rash as best we are able to do so. Atarax and Benadryl prn have been ordered for symptomatic relief.    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    -- Post-operative management including pain, diet, activity, Na levels, fluid orders, and steroids, will continue to be as per the guidance of the primary team    DVT Prophylaxis: Defer to primary service  Code Status: Full Code    Disposition: Expected discharge as per Neurosurgery    Gomez Chauhan MD    Reason for Consult   Reason for consult: I was asked by Dr. Pickens of Neurosurgery to evaluate this patient for a new rash.    Primary  Care Physician   *Isis Hernández    Chief Complaint   Rash    History is obtained from the patient    History of Present Illness   Diana Thompson is a markedly pleasant 66 year old woman who presents with a rash. She has been hospitalized here since 12/21/2021 for elective surgical removal of a suprasellar mass. It seems that earlier today during the day, she developed a red burning rash over all of her fingers, associate with a macular eruption over her upper arms. She says this did not itch or otherwise bother her. It seemed to resolve later in the day; however the rash has now recurred this evening, and can also be seen over the anterior trunk and upper breasts. She says she has never had such a rash before. She did get hives once in the past after having received Macrodantin, that was very itchy and over the abdomen, not like this rash. The symptoms do not seem to extend to the face or eyes or mouth. Nor does not seem to extend to the knees. Atarax has been given with minimal relief of the burning, which she says overall is very mild. She otherwise denies any fever, chills, sweats, tongue swelling, dyspnea, cough, stridor, or any other acute complaints.     Past Medical History    I have reviewed this patient's medical history and updated it with pertinent information if needed.   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  Past Surgical History:   Procedure Laterality Date     TUBAL LIGATION  1982       Prior to Admission Medications   Prior to Admission Medications   Prescriptions Last Dose Informant Patient Reported? Taking?   Multiple Vitamins-Minerals (MULTIVITAMIN ADULTS PO) Past Month at Unknown time Self Yes Yes    Sig: Take 1-2 Tablespoonful by mouth   calcium gluconate 500 MG tablet Past Month at Unknown time Self Yes Yes   Sig: Take 1,000 mg by mouth daily      Facility-Administered Medications: None     Allergies   Allergies   Allergen Reactions     Macrodantin [Nitrofurantoin] Hives     Sulfa Drugs        Social History   I have reviewed this patient's social history and updated it with pertinent information if needed. Diana Thompson  reports that she has never smoked. She has never used smokeless tobacco. She reports current alcohol use. She reports that she does not use drugs.    Family History   I have reviewed this patient's family history and updated it with pertinent information if needed.   Family History   Problem Relation Age of Onset     Diabetes Mother      Heart Disease Mother         CHF     Respiratory Father         COPD     Cancer No family hx of      Colon Cancer No family hx of      Breast Cancer No family hx of        Review of Systems   The 10 point Review of Systems is negative other than noted in the HPI or here.     Physical Exam   Temp: 98.6  F (37  C) Temp src: Axillary BP: 139/69 Pulse: 80   Resp: 18 SpO2: 100 % O2 Device: None (Room air)    Vital Signs with Ranges  Temp:  [97.4  F (36.3  C)-98.9  F (37.2  C)] 98.6  F (37  C)  Pulse:  [65-99] 80  Resp:  [16-18] 18  BP: (125-146)/(56-90) 139/69  SpO2:  [98 %-100 %] 100 %  123 lbs 0 oz    Constitutional: Alert and oriented to person, place and time; no apparent distress  HEENT: no evident ocular rash, moist mucous membranes  Respiratory: lungs clear to auscultation bilaterally  Cardiovascular: regular S1 S2   GI: abdomen soft non tender non distended bowel sounds positive  Lymph/Hematologic: no pallor, no cervical lymphadenopathy  Skin: all of the digits other hands have erythema and faint edema to the MCP's, not markedly tender, no cyanosis; also she has a faint, confluent macular eruption over her anterior trunk, also faintly seen over both  anterior humeri; this is not raised  Musculoskeletal: no clubbing, cyanosis or edema  Neurologic: extra-ocular muscles intact; moves all four extremities  Psychiatric: appropriate affect, insight and judgment    Data   -Data reviewed today: All pertinent laboratory and imaging results from this encounter were reviewed. I personally reviewed no images or EKG's today.  Recent Labs   Lab 12/23/21  1850 12/23/21  1153 12/23/21  0620 12/23/21  0556 12/22/21  1259 12/22/21  0635    141  --  142   < >  --    GLC  --   --  105*  --   --  109*    < > = values in this interval not displayed.       No results found for this or any previous visit (from the past 24 hour(s)).

## 2021-12-24 NOTE — DISCHARGE SUMMARY
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  Endoscopic endonasal transsphenoidal resection of suprasellar mass.   2.  Use of [employer] Stealth navigation with MRI and CT guidance.     Post op Diagnosis:     Suprasellar mass with optic chiasm compression.     Surgeon: Dr. Pickens    Disposition: Home     Diana Thompson verbalizes understanding and is in agreement with discharge.     Done while pt still in hospital bed      Review of your medicines      START taking      Dose / Directions   oxyCODONE 5 MG tablet  Commonly known as: ROXICODONE      Dose: 5 mg  Take 1 tablet (5 mg) by mouth every 4 hours as needed for pain  Quantity: 30 tablet  Refills: 0     senna-docusate 8.6-50 MG tablet  Commonly known as: SENOKOT-S/PERICOLACE      Dose: 1 tablet  Take 1 tablet by mouth 2 times daily  Quantity: 20 tablet  Refills: 0     tiZANidine 4 MG tablet  Commonly known as: ZANAFLEX      Dose: 2 mg  Take 0.5 tablets (2 mg) by mouth 3 times daily as needed for muscle spasms  Quantity: 20 tablet  Refills: 0        CONTINUE these medicines which have NOT CHANGED      Dose / Directions   calcium gluconate 500 MG tablet      Dose: 1,000 mg  Take 1,000 mg by mouth daily  Refills: 0     MULTIVITAMIN ADULTS PO      Dose: 1-2 Tablespoonful  Take 1-2 Tablespoonful by mouth  Refills: 0           Where to get your medicines      These medications were sent to [facility] [place], [place] - [address]  [address] [address]    Phone: [phone]     oxyCODONE 5 MG tablet    senna-docusate 8.6-50 MG tablet    tiZANidine 4 MG tablet          Discharge Procedure Orders   Sodium   Standing Status: Future Standing Exp. Date: 12/21/22   Order Comments: Patient to have  sodium blood draw on POD 7     Physical Therapy Referral   Standing Status: Future   Referral Priority: Routine: Next available opening Referral Type: Rehab Therapy Physical Therapy   Number of Visits Requested: 1     Reason for your hospital stay   Order Comments: Suprasellar mass with optic chiasm compression.       PROCEDURES:   1.  Endoscopic endonasal transsphenoidal resection of suprasellar mass.   2.  Use of [employer] Stealth navigation with MRI and CT guidance.     Follow-up and recommended labs and tests    Order Comments: Your Neurosurgical follow up appointments have been scheduled. You may call [phone] to make, confirm or change your follow-up Neurosurgery appointment dates and/or times.     Activity   Order Comments: Please limit your lifting to no more that ten pounds and avoid excessive jostling and jarring activities.     Order Specific Question Answer Comments   Is discharge order? Yes      Wound care and dressings   Order Comments: Instructions to care for your wound at home: ice to area for comfort, keep wound clean and dry, and may get incision wet in shower but do not soak or scrub.     Diet   Order Comments: Follow this diet upon discharge: Regular     Order Specific Question Answer Comments   Is discharge order? Yes           Respectfully,   LINDSEY Mcnally, PAHayleeC

## 2021-12-24 NOTE — DISCHARGE INSTRUCTIONS
Nasal precautions due to post op endonasal surgery: NO nose blowing. NO straws. NO incentive spirometer or positive pressure respiratory treatments.  NO oxygen by nasal cannula, NO capnography. NO tube placement through nose or mouth unless by direct visualization per ENT physician.

## 2021-12-24 NOTE — PROGRESS NOTES
Pt VSS on RA. A&Ox4, neurologically intact. Right nare leaking some serosanguinous fluid. Up with SBA walker, using bathroom. Pt voided after gunter discontinuation. Denies pain. Pt/OT saw pt. Stool softeners given. Discharge instructions given, all questions answered. Pt discharged safely via WC with .

## 2021-12-24 NOTE — PROGRESS NOTES
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 I was asked to see the patient for acute rash tonight, 12/23.    Rash: Cause unclear.   -- suspect maybe due to abx vs contact dermatitis. Patient was on ceftriaxone which was discontinued.   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    -- Post-operative management including pain, diet, activity, Na levels, fluid orders, and steroids, will continue to be as per the guidance of the primary team          Diet: Regular Diet Adult  Diet    DVT Prophylaxis: Defer to primary service  Perez Catheter: Not present  Central Lines: None  Code Status: Full Code      Disposition Plan   Expected Discharge: 12/24/2021     Anticipated discharge location: home with family    Delays:            The patient's care was discussed with the Patient.    Aniyah Kowalski MD  Hospitalist Service  [facility]  Securely message with the Vocera Web Console (learn more here)  Text page via [employer] Paging/Directory        Clinically Significant Risk Factors Present on Admission                 ______________________________________________________________________    Interval History   Patient seen this am. She notes her rash is resolved. Ha sno other complaints.     Data reviewed today: I reviewed all medications, new labs and imaging results over the last 24 hours. I personally reviewed no images or EKG's today.    Physical Exam   Vital Signs: Temp: 97.4  F (36.3  C) Temp src: Oral BP: (!) 143/73 Pulse: 70   Resp: 16 SpO2: 95 % O2 Device: None (Room air)    Weight: 123 lbs 0 oz  General Appearance: Well  appearing for stated age.  Respiratory: CTAB, no rales or ronchi  Cardiovascular: S1, S2 normal, no murmurs  GI: non-tender on palpation, BS present      Data   Recent Labs   Lab 12/24/21  1159 12/24/21  0616 12/24/21  0016 12/23/21  1153 12/23/21  0620 12/22/21  1259 12/22/21  0635   WBC  --  8.8  --   --   --   --   --    HGB  --  10.8*  --   --   --   --   --    MCV  --  94  --   --   --   --   --    PLT  --  269  --   --   --   --   --     141 141   < >  --    < >  --    GLC  --   --   --   --  105*  --  109*    < > = values in this interval not displayed.

## 2021-12-24 NOTE — PLAN OF CARE
Occupational Therapy Discharge Summary    Reason for therapy discharge:    Discharged to home with outpatient therapy.    Progress towards therapy goal(s). [facility] for goal details.  Goals partially met.  Barriers to achieving goals:   discharge from facility.    Therapy recommendation(s):    Continued therapy is recommended.  Rationale/Recommendations:  outpatient OT to improve functional mobility and independence with ADLs/IADLs. .

## 2021-12-27 ENCOUNTER — TELEPHONE (OUTPATIENT)
Dept: NEUROSURGERY | Facility: OTHER | Age: 66
End: 2021-12-27
Payer: COMMERCIAL

## 2021-12-27 ENCOUNTER — TELEPHONE (OUTPATIENT)
Dept: NEUROSURGERY | Facility: CLINIC | Age: 66
End: 2021-12-27
Payer: COMMERCIAL

## 2021-12-27 NOTE — TELEPHONE ENCOUNTER
Last Visit: DOS 12/21 Endoscopic endonasal transsphenoidal resection of suprasellar mass.  D/c 12/24     Next Visit: 1/5 with Mariela Diaz CNP      Name of Provider:  Dr Pickens     Assessment: This morning while brushing her teeth, patient noticed a few drops of clear fluid that she relates as a runny nose. This has since ceased. She denies a (positional) headache or any other changes.  She reports she still has No taste or smell  Reports a Headache once and a while. Takes tylenol with relief. Has not needed the oxycodone   Per , Had an outing in the car which was very helpful for her recovery. He balance is much better per .   They were not sent home with nasal spray   is asking when she can they start OT?  Again discussed nasal precautions     Recommendation given: Start nasal irrigation with spray 4-5 times per day per Mariela Diaz CNP. They did not deem her needing outpatient therapy at the hospital     Cancelled appointments with Dr Celio Reed's  rescheduled to the appropriate appointments: 1/10/22 and 1/31/21 with DR Pickens.     Patient has an appointment with Dr Davis on 1/17, 3pm. [address] Future appointments with him will be scheduled at that visit.     Updated patient and

## 2021-12-27 NOTE — TELEPHONE ENCOUNTER
Reason for call: Pt's  would like a call back regarding some drainage from her left nostril post surgery. Please call him back at [phone]. Thank you

## 2021-12-27 NOTE — TELEPHONE ENCOUNTER
Attempted to reach out to patient, no answer. Left voice message for patient to call clinic back to further discuss.     Patients  calling regarding some drainage from her left nostril post surgery.    Last Visit: DOS 12/21 Endoscopic endonasal transsphenoidal resection of suprasellar mass.  D/c 12/24    Next Visit: 1/5 with Mariela Diaz CNP     Name of Provider:  Dr Pickens    Assessment:     Recommendation given:     Action needed from provider:

## 2021-12-27 NOTE — TELEPHONE ENCOUNTER
Diana & Jeb called to ask question about Diana's recent surgery. She had drainage that she is concerned about. Please reach them at [phone]

## 2021-12-28 ENCOUNTER — NURSE TRIAGE (OUTPATIENT)
Dept: NURSING | Facility: CLINIC | Age: 66
End: 2021-12-28
Payer: COMMERCIAL

## 2021-12-29 ENCOUNTER — APPOINTMENT (OUTPATIENT)
Dept: CT IMAGING | Facility: CLINIC | Age: 66
DRG: 920 | End: 2021-12-29
Attending: EMERGENCY MEDICINE
Payer: COMMERCIAL

## 2021-12-29 ENCOUNTER — HOSPITAL ENCOUNTER (INPATIENT)
Facility: CLINIC | Age: 66
LOS: 3 days | Discharge: HOME | DRG: 920 | End: 2022-01-01
Attending: EMERGENCY MEDICINE | Admitting: INTERNAL MEDICINE
Payer: COMMERCIAL

## 2021-12-29 DIAGNOSIS — E89.3 STATUS POST TRANSSPHENOIDAL PITUITARY RESECTION (H): Primary | ICD-10-CM

## 2021-12-29 DIAGNOSIS — E87.1 HYPONATREMIA: ICD-10-CM

## 2021-12-29 DIAGNOSIS — G50.0 TRIGEMINAL NEURALGIA: ICD-10-CM

## 2021-12-29 DIAGNOSIS — R51.9 FACIAL PAIN: ICD-10-CM

## 2021-12-29 DIAGNOSIS — R55 SYNCOPE AND COLLAPSE: ICD-10-CM

## 2021-12-29 LAB
ALBUMIN SERPL-MCNC: 3.4 G/DL (ref 3.4–5)
ALBUMIN UR-MCNC: 10 MG/DL
ALP SERPL-CCNC: 84 U/L (ref 40–150)
ALT SERPL W P-5'-P-CCNC: 25 U/L (ref 0–50)
AMORPH CRY #/AREA URNS HPF: ABNORMAL /HPF
ANION GAP SERPL CALCULATED.3IONS-SCNC: 6 MMOL/L (ref 3–14)
ANNOTATION COMMENT IMP: ABNORMAL
APPEARANCE UR: ABNORMAL
AST SERPL W P-5'-P-CCNC: 40 U/L (ref 0–45)
BASOPHILS # BLD AUTO: 0.1 10E3/UL (ref 0–0.2)
BASOPHILS NFR BLD AUTO: 1 %
BILIRUB SERPL-MCNC: 0.4 MG/DL (ref 0.2–1.3)
BILIRUB UR QL STRIP: NEGATIVE
BUN SERPL-MCNC: 18 MG/DL (ref 7–30)
CALCIUM SERPL-MCNC: 8.4 MG/DL (ref 8.5–10.1)
CHLORIDE BLD-SCNC: 92 MMOL/L (ref 94–109)
CO2 SERPL-SCNC: 25 MMOL/L (ref 20–32)
COLOR UR AUTO: ABNORMAL
CORTIS F 24H UR HPLC-MCNC: 1230 UG/L
CORTIS F 24H UR-MRATE: 2952 UG/D
CORTIS F/CREAT 24H UR: 3727.27 UG/G CRT
CREAT 24H UR-MRATE: 792 MG/D
CREAT SERPL-MCNC: 0.88 MG/DL (ref 0.52–1.04)
CREAT UR-MCNC: 33 MG/DL
EOSINOPHIL # BLD AUTO: 0.2 10E3/UL (ref 0–0.7)
EOSINOPHIL NFR BLD AUTO: 3 %
ERYTHROCYTE [DISTWIDTH] IN BLOOD BY AUTOMATED COUNT: 13.2 % (ref 10–15)
GFR SERPL CREATININE-BSD FRML MDRD: 72 ML/MIN/1.73M2
GLUCOSE BLD-MCNC: 87 MG/DL (ref 70–99)
GLUCOSE UR STRIP-MCNC: NEGATIVE MG/DL
HCT VFR BLD AUTO: 32 % (ref 35–47)
HGB BLD-MCNC: 10.9 G/DL (ref 11.7–15.7)
HGB UR QL STRIP: NEGATIVE
HYALINE CASTS: 3 /LPF
IMM GRANULOCYTES # BLD: 0 10E3/UL
IMM GRANULOCYTES NFR BLD: 0 %
KETONES UR STRIP-MCNC: 10 MG/DL
LEUKOCYTE ESTERASE UR QL STRIP: NEGATIVE
LYMPHOCYTES # BLD AUTO: 1.9 10E3/UL (ref 0.8–5.3)
LYMPHOCYTES NFR BLD AUTO: 24 %
MCH RBC QN AUTO: 31.1 PG (ref 26.5–33)
MCHC RBC AUTO-ENTMCNC: 34.1 G/DL (ref 31.5–36.5)
MCV RBC AUTO: 91 FL (ref 78–100)
MONOCYTES # BLD AUTO: 0.7 10E3/UL (ref 0–1.3)
MONOCYTES NFR BLD AUTO: 9 %
MUCOUS THREADS #/AREA URNS LPF: PRESENT /LPF
NEUTROPHILS # BLD AUTO: 5.1 10E3/UL (ref 1.6–8.3)
NEUTROPHILS NFR BLD AUTO: 63 %
NITRATE UR QL: NEGATIVE
NRBC # BLD AUTO: 0 10E3/UL
NRBC BLD AUTO-RTO: 0 /100
OSMOLALITY UR: 512 MMOL/KG (ref 100–1200)
PH UR STRIP: 7 [PH] (ref 5–7)
PLATELET # BLD AUTO: 300 10E3/UL (ref 150–450)
POTASSIUM BLD-SCNC: 4.7 MMOL/L (ref 3.4–5.3)
PROT SERPL-MCNC: 7.1 G/DL (ref 6.8–8.8)
RBC # BLD AUTO: 3.5 10E6/UL (ref 3.8–5.2)
RBC URINE: 2 /HPF
SARS-COV-2 RNA RESP QL NAA+PROBE: NEGATIVE
SODIUM SERPL-SCNC: 123 MMOL/L (ref 133–144)
SODIUM SERPL-SCNC: 126 MMOL/L (ref 133–144)
SODIUM SERPL-SCNC: 126 MMOL/L (ref 133–144)
SODIUM UR-SCNC: 93 MMOL/L
SP GR UR STRIP: 1.02 (ref 1–1.03)
TROPONIN I SERPL HS-MCNC: 8 NG/L
TSH SERPL DL<=0.005 MIU/L-ACNC: 0.44 MU/L (ref 0.4–4)
UROBILINOGEN UR STRIP-MCNC: NORMAL MG/DL
WBC # BLD AUTO: 7.9 10E3/UL (ref 4–11)
WBC URINE: 9 /HPF

## 2021-12-29 PROCEDURE — 80053 COMPREHEN METABOLIC PANEL: CPT | Performed by: EMERGENCY MEDICINE

## 2021-12-29 PROCEDURE — 96374 THER/PROPH/DIAG INJ IV PUSH: CPT

## 2021-12-29 PROCEDURE — 99285 EMERGENCY DEPT VISIT HI MDM: CPT | Mod: 25

## 2021-12-29 PROCEDURE — 81001 URINALYSIS AUTO W/SCOPE: CPT | Performed by: INTERNAL MEDICINE

## 2021-12-29 PROCEDURE — 36415 COLL VENOUS BLD VENIPUNCTURE: CPT | Performed by: STUDENT IN AN ORGANIZED HEALTH CARE EDUCATION/TRAINING PROGRAM

## 2021-12-29 PROCEDURE — 84443 ASSAY THYROID STIM HORMONE: CPT | Performed by: INTERNAL MEDICINE

## 2021-12-29 PROCEDURE — 258N000003 HC RX IP 258 OP 636: Performed by: EMERGENCY MEDICINE

## 2021-12-29 PROCEDURE — 36415 COLL VENOUS BLD VENIPUNCTURE: CPT | Performed by: EMERGENCY MEDICINE

## 2021-12-29 PROCEDURE — 84484 ASSAY OF TROPONIN QUANT: CPT | Performed by: EMERGENCY MEDICINE

## 2021-12-29 PROCEDURE — 85025 COMPLETE CBC W/AUTO DIFF WBC: CPT | Performed by: EMERGENCY MEDICINE

## 2021-12-29 PROCEDURE — 120N000001 HC R&B MED SURG/OB

## 2021-12-29 PROCEDURE — 250N000013 HC RX MED GY IP 250 OP 250 PS 637: Performed by: PSYCHIATRY & NEUROLOGY

## 2021-12-29 PROCEDURE — 250N000013 HC RX MED GY IP 250 OP 250 PS 637: Performed by: INTERNAL MEDICINE

## 2021-12-29 PROCEDURE — 250N000011 HC RX IP 250 OP 636: Performed by: EMERGENCY MEDICINE

## 2021-12-29 PROCEDURE — 84300 ASSAY OF URINE SODIUM: CPT | Performed by: INTERNAL MEDICINE

## 2021-12-29 PROCEDURE — 83935 ASSAY OF URINE OSMOLALITY: CPT | Performed by: INTERNAL MEDICINE

## 2021-12-29 PROCEDURE — 84295 ASSAY OF SERUM SODIUM: CPT | Performed by: STUDENT IN AN ORGANIZED HEALTH CARE EDUCATION/TRAINING PROGRAM

## 2021-12-29 PROCEDURE — 87635 SARS-COV-2 COVID-19 AMP PRB: CPT | Performed by: EMERGENCY MEDICINE

## 2021-12-29 PROCEDURE — 99223 1ST HOSP IP/OBS HIGH 75: CPT | Mod: AI | Performed by: INTERNAL MEDICINE

## 2021-12-29 PROCEDURE — 96375 TX/PRO/DX INJ NEW DRUG ADDON: CPT

## 2021-12-29 PROCEDURE — C9803 HOPD COVID-19 SPEC COLLECT: HCPCS

## 2021-12-29 PROCEDURE — 70450 CT HEAD/BRAIN W/O DYE: CPT

## 2021-12-29 PROCEDURE — 99222 1ST HOSP IP/OBS MODERATE 55: CPT | Performed by: NURSE PRACTITIONER

## 2021-12-29 PROCEDURE — 250N000011 HC RX IP 250 OP 636: Performed by: STUDENT IN AN ORGANIZED HEALTH CARE EDUCATION/TRAINING PROGRAM

## 2021-12-29 RX ORDER — LORAZEPAM 2 MG/ML
0.5 INJECTION INTRAMUSCULAR ONCE
Status: COMPLETED | OUTPATIENT
Start: 2021-12-29 | End: 2021-12-29

## 2021-12-29 RX ORDER — OXYCODONE HYDROCHLORIDE 5 MG/1
5 TABLET ORAL EVERY 4 HOURS PRN
Status: DISCONTINUED | OUTPATIENT
Start: 2021-12-29 | End: 2022-01-01 | Stop reason: HOSPADM

## 2021-12-29 RX ORDER — KETOROLAC TROMETHAMINE 15 MG/ML
15 INJECTION, SOLUTION INTRAMUSCULAR; INTRAVENOUS EVERY 6 HOURS PRN
Status: DISCONTINUED | OUTPATIENT
Start: 2021-12-29 | End: 2022-01-01 | Stop reason: HOSPADM

## 2021-12-29 RX ORDER — NALOXONE HYDROCHLORIDE 0.4 MG/ML
0.2 INJECTION, SOLUTION INTRAMUSCULAR; INTRAVENOUS; SUBCUTANEOUS
Status: DISCONTINUED | OUTPATIENT
Start: 2021-12-29 | End: 2022-01-01 | Stop reason: HOSPADM

## 2021-12-29 RX ORDER — NALOXONE HYDROCHLORIDE 0.4 MG/ML
0.4 INJECTION, SOLUTION INTRAMUSCULAR; INTRAVENOUS; SUBCUTANEOUS
Status: DISCONTINUED | OUTPATIENT
Start: 2021-12-29 | End: 2022-01-01 | Stop reason: HOSPADM

## 2021-12-29 RX ORDER — TIZANIDINE 2 MG/1
2 TABLET ORAL 3 TIMES DAILY PRN
Status: DISCONTINUED | OUTPATIENT
Start: 2021-12-29 | End: 2022-01-01 | Stop reason: HOSPADM

## 2021-12-29 RX ORDER — ACETAMINOPHEN 500 MG
1000 TABLET ORAL EVERY 6 HOURS PRN
Status: DISCONTINUED | OUTPATIENT
Start: 2021-12-29 | End: 2022-01-01 | Stop reason: HOSPADM

## 2021-12-29 RX ORDER — LIDOCAINE 40 MG/G
CREAM TOPICAL
Status: DISCONTINUED | OUTPATIENT
Start: 2021-12-29 | End: 2022-01-01 | Stop reason: HOSPADM

## 2021-12-29 RX ORDER — GABAPENTIN 300 MG/1
300 CAPSULE ORAL 3 TIMES DAILY
Status: DISCONTINUED | OUTPATIENT
Start: 2021-12-29 | End: 2021-12-30

## 2021-12-29 RX ORDER — MORPHINE SULFATE 4 MG/ML
4 INJECTION, SOLUTION INTRAMUSCULAR; INTRAVENOUS ONCE
Status: COMPLETED | OUTPATIENT
Start: 2021-12-29 | End: 2021-12-29

## 2021-12-29 RX ORDER — ONDANSETRON 2 MG/ML
4 INJECTION INTRAMUSCULAR; INTRAVENOUS EVERY 6 HOURS PRN
Status: DISCONTINUED | OUTPATIENT
Start: 2021-12-29 | End: 2022-01-01 | Stop reason: HOSPADM

## 2021-12-29 RX ORDER — ONDANSETRON 4 MG/1
4 TABLET, ORALLY DISINTEGRATING ORAL EVERY 6 HOURS PRN
Status: DISCONTINUED | OUTPATIENT
Start: 2021-12-29 | End: 2022-01-01 | Stop reason: HOSPADM

## 2021-12-29 RX ORDER — AMOXICILLIN 250 MG
1 CAPSULE ORAL 2 TIMES DAILY
Status: DISCONTINUED | OUTPATIENT
Start: 2021-12-29 | End: 2022-01-01 | Stop reason: HOSPADM

## 2021-12-29 RX ORDER — CALCIUM CARBONATE 500 MG/1
1000 TABLET, CHEWABLE ORAL DAILY
Status: DISCONTINUED | OUTPATIENT
Start: 2021-12-29 | End: 2022-01-01 | Stop reason: HOSPADM

## 2021-12-29 RX ORDER — POLYETHYLENE GLYCOL 3350 17 G/17G
17 POWDER, FOR SOLUTION ORAL DAILY
Status: DISCONTINUED | OUTPATIENT
Start: 2021-12-29 | End: 2022-01-01 | Stop reason: HOSPADM

## 2021-12-29 RX ORDER — HYDROMORPHONE HCL IN WATER/PF 6 MG/30 ML
0.2 PATIENT CONTROLLED ANALGESIA SYRINGE INTRAVENOUS
Status: DISCONTINUED | OUTPATIENT
Start: 2021-12-29 | End: 2022-01-01 | Stop reason: HOSPADM

## 2021-12-29 RX ORDER — ACETAMINOPHEN 500 MG
1000 TABLET ORAL EVERY 6 HOURS PRN
COMMUNITY

## 2021-12-29 RX ADMIN — CALCIUM CARBONATE (ANTACID) CHEW TAB 500 MG 1000 MG: 500 CHEW TAB at 13:02

## 2021-12-29 RX ADMIN — SENNOSIDES AND DOCUSATE SODIUM 1 TABLET: 50; 8.6 TABLET ORAL at 21:58

## 2021-12-29 RX ADMIN — ACETAMINOPHEN 1000 MG: 500 TABLET, FILM COATED ORAL at 22:01

## 2021-12-29 RX ADMIN — MORPHINE SULFATE 4 MG: 4 INJECTION INTRAVENOUS at 03:22

## 2021-12-29 RX ADMIN — SODIUM CHLORIDE 1000 ML: 9 INJECTION, SOLUTION INTRAVENOUS at 05:58

## 2021-12-29 RX ADMIN — GABAPENTIN 300 MG: 300 CAPSULE ORAL at 17:10

## 2021-12-29 RX ADMIN — SENNOSIDES AND DOCUSATE SODIUM 1 TABLET: 50; 8.6 TABLET ORAL at 13:01

## 2021-12-29 RX ADMIN — LORAZEPAM 0.5 MG: 2 INJECTION INTRAMUSCULAR; INTRAVENOUS at 03:22

## 2021-12-29 RX ADMIN — GABAPENTIN 300 MG: 300 CAPSULE ORAL at 21:58

## 2021-12-29 RX ADMIN — KETOROLAC TROMETHAMINE 15 MG: 15 INJECTION, SOLUTION INTRAMUSCULAR; INTRAVENOUS at 13:01

## 2021-12-29 RX ADMIN — POLYETHYLENE GLYCOL 3350 17 G: 17 POWDER, FOR SOLUTION ORAL at 13:10

## 2021-12-29 ASSESSMENT — ACTIVITIES OF DAILY LIVING (ADL)
ADLS_ACUITY_SCORE: 3
ADLS_ACUITY_SCORE: 5
ADLS_ACUITY_SCORE: 3
ADLS_ACUITY_SCORE: 7
ADLS_ACUITY_SCORE: 5
ADLS_ACUITY_SCORE: 3
ADLS_ACUITY_SCORE: 5
ADLS_ACUITY_SCORE: 7
ADLS_ACUITY_SCORE: 7
ADLS_ACUITY_SCORE: 3
ADLS_ACUITY_SCORE: 3

## 2021-12-29 ASSESSMENT — ENCOUNTER SYMPTOMS
HEADACHES: 1
PHOTOPHOBIA: 0
PALPITATIONS: 0
NUMBNESS: 0
WEAKNESS: 0
DIZZINESS: 0

## 2021-12-29 NOTE — TELEPHONE ENCOUNTER
Jeb () calls and says that his wife had a pituitary mass removed, 1 week ago, and says that his wife has mouth pain at times. Jeb says that pt's 2 upper, back tooth implants will give pt. A sharp pain that lasts a few seconds and then goes away. Jeb says that this started yesterday. Jeb says that pt's Tylenol controls pt's pain. Jeb says that he gave pt. 1 Oxycodone for her pain. COVID 19 Nurse Triage Plan/Patient Instructions    Please be aware that novel coronavirus (COVID-19) may be circulating in the community. If you develop symptoms such as fever, cough, or SOB or if you have concerns about the presence of another infection including coronavirus (COVID-19), please contact your health care provider or visit [email or web address].     Disposition/Instructions    Virtual Visit with provider recommended. Reference Visit Selection Guide.    Thank you for taking steps to prevent the spread of this virus.  o Limit your contact with others.  o Wear a simple mask to cover your cough.  o Wash your hands well and often.    Resources    [facility]: About COVID-19: [email or web address]    CDC: What to Do If You're Sick: www.cdc.gov/coronavirus/2019-ncov/about/steps-when-sick.html    CDC: Ending Home Isolation: www.cdc.gov/coronavirus/2019-ncov/hcp/disposition-in-home-patients.html     CDC: Caring for Someone: www.cdc.gov/coronavirus/2019-ncov/if-you-are-sick/care-for-someone.html     [facility]: Interim Guidance for Hospital Discharge to Home: www.health.[facility].[place]/diseases/coronavirus/hcp/hospdischarge.pdf    [facility] clinical trials (COVID-19 research studies): [email or web address]     Below are the COVID-19 hotlines at the [place] [facility] ([facility]). Interpreters are available.   o For health questions: Call 618-310-4959 or 1-716.339.6498 (7 a.m. to 7 p.m.)  o For questions about schools and childcare: Call 104-338-5678 or 1-342.815.2751 (7  a.m. to 7 p.m.)                   Reason for Disposition    [1] Caller has NON-URGENT question AND [2] triager unable to answer question    Additional Information    Negative: Sounds like a life-threatening emergency to the triager    Negative: Chest pain    Negative: Difficulty breathing    Negative: Acting confused (e.g., disoriented, slurred speech) or excessively sleepy    Negative: Surgical incision symptoms and questions    Negative: [1] Discomfort (pain, burning or stinging) when passing urine AND [2] male    Negative: [1] Discomfort (pain, burning or stinging) when passing urine AND [2] female    Negative: Constipation    Negative: New or worsening leg (calf, thigh) pain    Negative: New or worsening leg swelling    Negative: Dizziness is severe, or persists > 24 hours after surgery    Negative: Pain, redness, swelling, or pus at IV Site    Negative: Symptoms arising from use of a urinary catheter (Perez or Coude)    Negative: Cast problems or questions    Negative: Medication question    Negative: [1] Widespread rash AND [2] bright red, sunburn-like    Negative: [1] SEVERE headache AND [2] after spinal (epidural) anesthesia    Negative: [1] Vomiting AND [2] persists > 4 hours    Negative: [1] Vomiting AND [2] abdomen looks much more swollen than usual    Negative: [1] Drinking very little AND [2] dehydration suspected (e.g., no urine > 12 hours, very dry mouth, very lightheaded)    Negative: Patient sounds very sick or weak to the triager    Negative: Sounds like a serious complication to the triager    Negative: Fever > 100.4 F (38.0 C)    Negative: [1] SEVERE post-op pain (e.g., excruciating, pain scale 8-10) AND [2] not controlled with pain medications    Negative: [1] Caller has URGENT question AND [2] triager unable to answer question    Negative: [1] Headache AND [2] after spinal (epidural) anesthesia AND [3] not severe    Negative: Fever present > 3 days (72 hours)    Negative: [1] MILD-MODERATE  post-op pain (e.g., pain scale 1-7) AND [2] not controlled with pain medications    Protocols used: POST-OP SYMPTOMS AND SHLOVEZDH-D-MM

## 2021-12-29 NOTE — ED TRIAGE NOTES
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 c/o shooting headache today and had syncopal episode while sitting on toilet - lowered to floor by

## 2021-12-29 NOTE — ED NOTES
Bed: ED03  Expected date: 12/29/21  Expected time: 1:45 AM  Means of arrival: Ambulance  Comments:  Flaco Jorgensen1 66F head pain; one wk s/p brain surg.

## 2021-12-29 NOTE — PLAN OF CARE
Shift Note: A/O x 4.  Neuros intact.  Lethargic and drowsy, sleeps between cares, likely related to ativan and morphine received in ER.  Complains of bilateral sharp pain in cheek bones, and 5/10 headache localized in forehead.  PRN Toradol given with good relief.  VSS on room air.  Ambulated to bathroom A1 GB, became dizzy and nauseous. Voiding spontaneously, resolved by resting in bed.  No BM this shift, last BM yesterday morning per patient report.  Serial sodium collections, last sodium 126.  1500 fluid restriction.  L. PIV saline locked. Awaiting input from neuro.  Continue to monitor, discharge pending clinical improvement.

## 2021-12-29 NOTE — PHARMACY-ADMISSION MEDICATION HISTORY
Pharmacy Medication History  Admission medication history interview status for the 12/29/2021  admission is complete. See EPIC admission navigator for prior to admission medications     Pharmacy Medication History  Admission medication history interview status for the 12/29/2021  admission is complete. See EPIC admission navigator for prior to admission medications     Location of Interview: Phone  Medication history sources: Patient's family/friend (Spouse) and Care Everywhere    Significant changes made to the medication list:  Added: Tylenol, Ocean nasal spray    In the past week, patient estimated taking medication this percent of the time: greater than 90%    Additional medication history information:   Patient has not started taking senna-docusate or taken any doses of tizanidine yet. Spouse reported patient has been consistently taking Tylenol for pain and took 1 dose of oxycodone last night.    Medication reconciliation completed by provider prior to medication history? No    Time spent in this activity: 15 mins    Prior to Admission medications    Medication Sig Last Dose Taking? Auth Provider   acetaminophen (TYLENOL) 500 MG tablet Take 1,000 mg by mouth every 6 hours as needed for pain 12/28/2021 at PM Yes Unknown, Entered By History   calcium gluconate 500 MG tablet Take 1,000 mg by mouth daily 12/28/2021 at AM Yes Reported, Patient   Multiple Vitamins-Minerals (MULTIVITAMIN ADULTS PO) Take 1-2 Tablespoonful by mouth 12/28/2021 at AM Yes Reported, Patient   oxyCODONE (ROXICODONE) 5 MG tablet Take 1 tablet (5 mg) by mouth every 4 hours as needed for pain 12/28/2021 at PM Yes Tony Smith PA-C   senna-docusate (SENOKOT-S/PERICOLACE) 8.6-50 MG tablet Take 1 tablet by mouth 2 times daily  Yes Tony Smith PA-C   sodium chloride (OCEAN) 0.65 % nasal spray Spray 1 spray into both nostrils daily as needed for congestion  Yes Unknown, Entered By History   tiZANidine (ZANAFLEX) 4 MG  tablet Take 0.5 tablets (2 mg) by mouth 3 times daily as needed for muscle spasms  Yes Tony Smith PA-C       The information provided in this note is only as accurate as the sources available at the time of update(s)     Mann RobersonD

## 2021-12-29 NOTE — ED NOTES
[facility]  ED Nurse Handoff Report    ED Chief complaint: Headache (pt biba from home - had tumor removed from pituitary gland last week. c/o shooting headache today and had syncopal episode while sitting on toilet - lowered to floor by )      ED Diagnosis:   Final diagnoses:   Hyponatremia   Trigeminal neuralgia   Facial pain   Syncope and collapse       Code Status: Full Code    Allergies:   Allergies   Allergen Reactions     Macrodantin [Nitrofurantoin] Hives     Sulfa Drugs        Patient Story: pt presents to ED via ambulance for headache x1 day. Reports bilateral sharp shooting pain from jaw to head. Pt had a tumor removed from pituitary gland on 12/21.   Focused Assessment:  A&o x4, respirations easy and unlabored. Denies nausea, vomiting or photosensitivity.     Treatments and/or interventions provided: morphine, ativan ivp - neurosurgery consult. Hyponatremic - NS bolus given  Patient's response to treatments and/or interventions: fair    To be done/followed up on inpatient unit:  pain management    Does this patient have any cognitive concerns?: none    Activity level - Baseline/Home:  Independent  Activity Level - Current:   Independent    Patient's Preferred language: English   Needed?: No    Isolation: None  Infection: Not Applicable  Patient tested for COVID 19 prior to admission: YES  Bariatric?: No    Vital Signs:   Vitals:    12/29/21 0158   BP: 116/63   Pulse: 68   Resp: 16   Temp: 99.7  F (37.6  C)   TempSrc: Oral   SpO2: 99%   Weight: 54.4 kg (120 lb)       Cardiac Rhythm:     Was the PSS-3 completed:   Yes  What interventions are required if any?               Family Comments: n/a  OBS brochure/video discussed/provided to patient/family: N/A              Name of person given brochure if not patient: n/a              Relationship to patient: n/a    For the majority of the shift this patient's behavior was Green.   Behavioral interventions performed were  reassurance and information.    ED NURSE PHONE NUMBER: *[phone]

## 2021-12-29 NOTE — H&P
66 year old with recent transphenoid resection of pituitary tumor returns with severe bilateral facial pain, headache and hyponatremia who also had a syncopal and possible vagal reaction on the commode.  Will be admitted with neurosurgical and gen neurology consultation.      Bruce Ceja MD    Dictation # [id number]

## 2021-12-29 NOTE — PROGRESS NOTES
RECEIVING UNIT ED HANDOFF REVIEW    ED Nurse Handoff Report was reviewed by: Laney Torres RN on December 29, 2021 at 8:48 AM

## 2021-12-29 NOTE — PROGRESS NOTES
Contacted regarding post op pituitary resection 12/21/21 presenting to ER with severe bilateral jaw pain.  Per ER doc no headache.  In addition notes indicate possible CSF leak when called office 12/27/21.  When ER doc returned to room to inquire about nasal drainage and symptoms related to position patient was to somnolent to arouse likely secondary to recent Ativan and Morhpine.  Note hyponatremic at 123    Head CT:                                                           IMPRESSION:  1.  Redemonstrated postoperative changes consistent with a transsphenoidal sella/suprasellar tumor resection with a cystic resection cavity in the sella/suprasellar region. Findings are grossly similar to the prior MRI.  2.  No definite acute intracranial hemorrhage.    RECOMMENDATIONS:  Planning to admit for correction of sodium.  Will see in consultation.    LINDSEY Mcallister  [facility] Neurosurgery  [address] [address]    Tel [phone]  Pager [phone]

## 2021-12-29 NOTE — H&P
Admitted: 12/29/2021    PRIMARY CARE PHYSICIAN:  Dr. Isis Hernández.    PRIOR NEUROSURGEON:  Dr. Roberto Pickens.     ENT SURGEON:  Dr. Eamon Davis.    CHIEF COMPLAINT:  Bilateral facial pain.    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  This took place on 12/21/2021 with the cooperation of ENT.  The patient was subsequently discharged on 12/24/2021 in stable condition.  She was discharged with oxycodone and Zanaflex.  The patient came to [facility] Emergency Department due to headache and specifically facial pain.    The patient was brought in from home.  According to the patient, the patient complains of shooting headache today as well as a syncopal episode while sitting on the toilet.  She was lowered to the floor by her .  She has shooting pain across her face.  She was seen in the Emergency Department by Dr. Cameron Rivera.  She had temperature of 99.7.  Otherwise, vital signs were stable.  Her sodium was decreased at 123, potassium 4.7, BUN of 18, creatinine 0.8 with calculated GFR of 72.  LFTs were normal.  CBC also shows a white count of 7.8, hemoglobin 10.9, platelets of 300.  COVID test was negative.  She had a repeat head CT without contrast which showed redemonstrated postoperative changes consistent with transsphenoidal cellar/suprasellar tumor resection with cystic resection cavity, and the findings are grossly similar to prior MRI.  There is no acute intracranial hemorrhage noted.  The patient was given Ativan and morphine because of her severe pain.  The patient was quite sedated and really unable to cooperate with any physical exam.    PAST MEDICAL HISTORY:   1.  Suprasellar mass, status post transsphenoidal resection.  2.  Sleep related leg cramps.  3.  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   4.  Heart murmur with normal echo.    PAST SURGICAL HISTORY:   Tubal ligation.    ALLERGIES:  MACRODANTIN AND SULFA DRUGS.    SOCIAL HISTORY:  No tobacco or alcohol.    FAMILY HISTORY:  Positive for diabetes and heart failure in mother, COPD in father.    CURRENT MEDICATION LIST:  Include oxycodone, senna, tizanidine, Tylenol, calcium, multivitamin, sodium chloride Ocean spray.    REVIEW OF SYSTEMS:  Unable to provide due to the patient's sedation.    PHYSICAL EXAMINATION:    VITAL SIGNS:  Temperature 99.7, heart rate 71, respirations 16, blood pressure 102/48, sats are 92% on room air.  GENERAL:  The patient is sedated, unable to really provide much history.  HEENT:  Grossly appears normal.  Head is atraumatic.  NECK:  JVP is not elevated.  PULMONARY:  Clear to auscultation.  CARDIOVASCULAR:  S1, S2, regular rate and rhythm.  GASTROINTESTINAL:  Abdomen is soft, nontender.  MUSCULOSKELETAL:  Shows no edema.  NEUROLOGIC:  She is sedated, unable to follow commands.  SKIN:  Warm, dry, well perfused.  PSYCHIATRIC:  Unable to assess.    LABORATORY DATA:  As dictated in history of present illness.    ASSESSMENT:  Diana Thompson is 66, who underwent a transsphenoidal resection of a suprasellar mass about a week ago, returns with a syncopal episode, hyponatremia, severe bilateral facial pain, being admitted for further treatment.    PLAN:     1.  Hyponatremia.  This may be SIADH due to her recent intracranial surgery versus dehydration.  The patient will undergo further SIADH labs including urine sodium, urine osmolality.  We will hold off on giving her fluids until we have these chemistries back.  We will do sodium checks every 4 hours, and she may need to be placed on fluid restriction.  2.  Bilateral facial pain, headache.  The patient had quite severe pain shooting bilaterally that is intermittent, appears to be neuropathic; however, her symptoms are curious in that it is bilateral.  She did receive morphine, which sedated her quite a bit.  We will obtain a General Neurology  consultation for further assistance and positive pain management with the initiation of Tegretol or other neuromodulating pain medications.  Unfortunately, the patient is unable to get a full exam on her due to her sedation.  3.  Recent pituitary adenoma resection.  We will obtain consultation from Neurosurgery.  There was apparently a runny nose yesterday, but obviously a concern for possible CNS leak.  4.  Deep venous thrombosis prophylaxis.  The patient received compression boots.    CODE STATUS:  FULL.    Bruce Ceja MD        D: 2021   T: 2021   MT: [id number]    Name:     CELIA SCHAEFER  MRN:      [id number]        Account:     [id number]   :      1955           Admitted:    2021       Document: [id number]    cc:  MD Eamon Villela MD Rohan R. Lall, MD

## 2021-12-29 NOTE — PROGRESS NOTES
Brief Rounding note    Patient admitted early this morning by Dr Ceja for headache, hyponatremia and possible CSF leak s/p endonasal transphenoidal resection. She was evaluated at bedside and was waking up after being sedated in ED for imaging. She reported ongoing headache. No nausea or vomiting. Runny nose has resolved. She is moving all extremities. She doesn't open eyes due to photophobia.     She has had a 15 point sodium drop in 5 days however we are unclear on current Na trajectory after getting fluids in the ED. Will continue with q4h sodium checks and follow next sodium level and urine results to determine need for fluid restriction vs replacement. She appears euvolemic on exam. NSGY has evaluated and she has been cleared to discharge from their standpoint. Continue PRN pain meds for her headache add on toradol as first line and avoid IV narcotics. May consider caffeine as well.    Addendum    Small increase in Na 123>126 over 12 hour period after 1L fluid bolus in ED, however urine studies consistent with SIADH. Will continue fluid restriction and trend Na q6h for now. Obtain AM cortisol levels. Could also be cerebral salt wasting.    Full note to follow tomorrow.    Alessia Alex DO  Hospitalist

## 2021-12-29 NOTE — ED PROVIDER NOTES
History   Chief Complaint:  Headache (pt biba from home - had tumor removed from pituitary gland last week. c/o shooting headache today and had syncopal episode while sitting on toilet - lowered to floor by )       SHAYLA Thompson is a 66 year old female with history of brain mass who presents with headache. The patient stated that she went to the restroom today and passed out while sitting on the toiled and was lowered to the ground by her . She also reports shooting headache pains that started tonight. She has a recent history or tumor removal from her pituitary gland about a week ago.     Review of Systems   Eyes: Negative for photophobia and visual disturbance.   Cardiovascular: Negative for palpitations.   Neurological: Positive for headaches. Negative for dizziness, weakness and numbness.   All other systems reviewed and are negative.    Allergies:  Macrodantin   Sulfa Drugs    Medications:  calcium gluconate 500 MG tablet  Multiple Vitamins-Minerals (MULTIVITAMIN ADULTS PO)  oxyCODONE (ROXICODONE) 5 MG tablet  senna-docusate (SENOKOT-S/PERICOLACE) 8.6-50 MG tablet  tiZANidine (ZANAFLEX) 4 MG tablet    Past Medical History:    Heart murmur  Retinal artery occlusion  Sleep related leg cramps  Suprasellar mass    Past Surgical History:    Optical tracking system endonasal surgery   Tubal ligation     Family History:    Diabetes  Mother  Heart disease   Mother  COPD   Father    Social History:  The patient was brought to the emergency department by EMS.  The patient presents to the emergency department alone.    Physical Exam     Patient Vitals for the past 24 hrs:   BP Temp Temp src Pulse Resp SpO2 Weight   12/29/21 0530 -- -- -- -- -- 94 % --   12/29/21 0515 -- -- -- -- -- 95 % --   12/29/21 0500 -- -- -- -- -- 95 % --   12/29/21 0445 -- -- -- -- -- 94 % --   12/29/21 0430 -- -- -- -- -- 93 % --   12/29/21 0415 -- -- -- -- -- 94 % --   12/29/21 0200 116/63 -- -- -- -- 98 % --   12/29/21 0158  116/63 99.7  F (37.6  C) Oral 68 16 99 % 54.4 kg (120 lb)     Physical Exam  Constitutional:  Oriented to person, place, and time. Minor distress due to pain.  HENT:   Head:    Normocephalic. Atraumatic. TMs clear.   Mouth/Throat:   Oropharynx is clear and moist. No swelling.  Eyes:    EOM are normal. Pupils are equal, round, and reactive to light.   Neck:    Neck supple.   HEENT:   No TMJ tenderness, no pain on percussion of sinuses.   Cardiovascular:  Normal rate, regular rhythm and normal heart sounds.      Exam reveals no gallop and no friction rub.       No murmur heard.  Pulmonary/Chest:  Effort normal and breath sounds normal.      No respiratory distress. No wheezes. No rales.      No reproducible chest wall pain.  Abdominal:   Soft. No distension. No tenderness. No rebound and no guarding.   Musculoskeletal:  Normal range of motion.   Neurological:   Alert and oriented to person, place, and time.           Moves all 4 extremities spontaneously. Cranial nerves 2-12 grossly normal. No meningeal signs. No ataxia. 5/5 strength and sensation intact to light touch in all 4 extremities.   Skin:    No rash noted. No pallor.     Emergency Department Course     Imaging:  Head CT w/o contrast  1.  Redemonstrated postoperative changes consistent with a transsphenoidal sella/suprasellar tumor resection with a cystic resection cavity in the sella/suprasellar region. Findings are grossly similar to the prior MRI.  2.  No definite acute intracranial hemorrhage.    Report per radiology    Laboratory:  Labs Ordered and Resulted from Time of ED Arrival to Time of ED Departure   COMPREHENSIVE METABOLIC PANEL - Abnormal       Result Value    Sodium 123 (*)     Potassium 4.7      Chloride 92 (*)     Carbon Dioxide (CO2) 25      Anion Gap 6      Urea Nitrogen 18      Creatinine 0.88      Calcium 8.4 (*)     Glucose 87      Alkaline Phosphatase 84      AST 40      ALT 25      Protein Total 7.1      Albumin 3.4      Bilirubin Total  0.4      GFR Estimate 72     CBC WITH PLATELETS AND DIFFERENTIAL - Abnormal    WBC Count 7.9      RBC Count 3.50 (*)     Hemoglobin 10.9 (*)     Hematocrit 32.0 (*)     MCV 91      MCH 31.1      MCHC 34.1      RDW 13.2      Platelet Count 300      % Neutrophils 63      % Lymphocytes 24      % Monocytes 9      % Eosinophils 3      % Basophils 1      % Immature Granulocytes 0      NRBCs per 100 WBC 0      Absolute Neutrophils 5.1      Absolute Lymphocytes 1.9      Absolute Monocytes 0.7      Absolute Eosinophils 0.2      Absolute Basophils 0.1      Absolute Immature Granulocytes 0.0      Absolute NRBCs 0.0     TROPONIN I - Normal    Troponin I High Sensitivity 8       Emergency Department Course:    Reviewed:  I reviewed nursing notes, vitals and past medical history    Assessments/Consults  0225 I obtained history and examined the patient as noted above.     Interventions:  Medications   0.9% sodium chloride BOLUS (has no administration in time range)   morphine (PF) injection 4 mg (4 mg Intravenous Given 12/29/21 0322)   LORazepam (ATIVAN) injection 0.5 mg (0.5 mg Intravenous Given 12/29/21 0322)     Disposition:  The patient was admitted to the hospital under the care of Dr. Ceja.     Impression & Plan     Medical Decision Making:  Diana Thompson is a 66 year old female that presented to the emergency room complaining of facial pain. She is post pituitary surgery. Differential includes post-op complication, hyponatremia, CSF leak or other causes. Workup thus far is reassuring with negative CT of her head as well as quite low sodium 123. Regarding facial pain, it is not reproducible with sinus or TMJ not is this oral process. My suspicion is for trigeminal neuralgia from hyponatremia. Pain improved on intervention. Neurosurgery was consulted and will evaluate the patient in AM. The patient will be admitted for close monitoring.    Diagnosis:    ICD-10-CM    1. Hyponatremia  E87.1    2. Trigeminal neuralgia  G50.0     3. Facial pain  R51.9    4. Syncope and collapse  R55      Scribe Disclosure:  I, Nitesh Eisenberg, am serving as a scribe at 2:20 AM on 12/29/2021 to document services personally performed by Cameron Rivera MD based on my observations and the provider's statements to me.              Cameron Rivera MD  12/29/21 0776

## 2021-12-29 NOTE — CONSULTS
[facility]    Neurology Consultation    Diana Thompson MRN# [id number]   YOB: 1955 Age: 66 year old    Code Status:Full Code   Date of Admission: 12/29/2021  Date of Consult:12/29/2021                                                                                       Assessment and Plan:                                         #. 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 Description not entirely c/w trigeminal neuralgia.      --Will order gabapentin for pain management.    Preferred over carbamazepine/oxcarbazepine as will not worsen hyponatremia.     Consider repeat MRI if no improvement in pain or other neurologic signs or symptoms occur  #. 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  #. Visual impairment improved, related to suprasellar mass.   --monitor  #. DVT Prophylaxis  --per hospitalist service    ----------------------------------------------------------------------------------  ----------------------------------------------------------------------------------  Reason for consult: as above       Chief Complaint:   Facial pain  History is obtained from the patient / chart       History of Present Illness:   This patient is a 66 year old female who presents with bilateral facial pain.      Patient has a history of suprasellar mass that was identified by ophthalmologist when she developed progressive bitemporal vision loss over the last year.    She underwent a transnasal endoscopic resection of the pituitary sellar mass on December 21.  She was discharged from the hospital on December 24.  She reports that she would have twinges of pain that would radiate into her bifrontal and bifacial area the first few days.  Yesterday December 28, she experienced a syncopal episode and sudden worsening of repeated by facial pain.  It would be primarily in  "the cheek area but sometimes radiating up into the eyes and forehead.  She describes it as a muscular spasm type pain and sometimes \"shooting\".  Since her admission yesterday receiving medications, she reports the intensity of the pain is reduced.  She does however find that it will periodically occur generally when she is moving.      She has found that her vision is actually improved postoperatively.    Neurosurgery consult and hospitalist notes are reviewed.  She is also being treated for hyponatremia    Denies any prior history of headaches         Past Medical History:     Past Medical History:   Diagnosis Date     Heart murmur 1981    normal echo      Retinal artery occlusion     Transient retinal artery occlusion of the left eye     Sleep related leg cramps      Suprasellar mass          Past Surgical History:     Past Surgical History:   Procedure Laterality Date     OPTICAL TRACKING SYSTEM ENDOSCOPIC ENDONASAL SURGERY N/A 12/21/2021    Procedure: Endoscopic endonasal resection of suprasellar mass;  Surgeon: Roberto Pickens MD;  Location: [facility] OR     TUBAL LIGATION  1982          Social History:     Social History     Socioeconomic History     Marital status:      Spouse name: Jeb      Number of children: 2     Years of education: 14     Highest education level: None   Occupational History     Occupation:       Employer: [employer] [facility] [employer]   Tobacco Use     Smoking status: Never Smoker     Smokeless tobacco: Never Used   Vaping Use     Vaping Use: Never used   Substance and Sexual Activity     Alcohol use: Yes     Comment: occasional      Drug use: No     Sexual activity: Yes     Partners: Male     Birth control/protection: Post-menopausal   Other Topics Concern     Parent/sibling w/ CABG, MI or angioplasty before 65F 55M? Not Asked   Social History Narrative     None     Social Determinants of Health     Financial Resource Strain: Not on file   Food Insecurity: Not on file "   Transportation Needs: Not on file   Physical Activity: Not on file   Stress: Not on file   Social Connections: Not on file   Intimate Partner Violence: Not on file   Housing Stability: Not on file     Patient denies smoking, no significant alcohol intake, denies illicit drugs use       Family History:     Family History   Problem Relation Age of Onset     Diabetes Mother      Heart Disease Mother         CHF     Respiratory Father         COPD     Cancer No family hx of      Colon Cancer No family hx of      Breast Cancer No family hx of      Reviewed and not felt to be contributory.        Home Medications:     Prior to Admission Medications   Prescriptions Last Dose Informant Patient Reported? Taking?   Multiple Vitamins-Minerals (MULTIVITAMIN ADULTS PO) 12/28/2021 at AM Spouse/Significant Other Yes Yes   Sig: Take 1-2 Tablespoonful by mouth   acetaminophen (TYLENOL) 500 MG tablet 12/28/2021 at PM Spouse/Significant Other Yes Yes   Sig: Take 1,000 mg by mouth every 6 hours as needed for pain   calcium gluconate 500 MG tablet 12/28/2021 at AM Spouse/Significant Other Yes Yes   Sig: Take 1,000 mg by mouth daily   oxyCODONE (ROXICODONE) 5 MG tablet 12/28/2021 at PM Spouse/Significant Other No Yes   Sig: Take 1 tablet (5 mg) by mouth every 4 hours as needed for pain   senna-docusate (SENOKOT-S/PERICOLACE) 8.6-50 MG tablet  Spouse/Significant Other No Yes   Sig: Take 1 tablet by mouth 2 times daily   sodium chloride (OCEAN) 0.65 % nasal spray   Yes Yes   Sig: Spray 1 spray into both nostrils daily as needed for congestion   tiZANidine (ZANAFLEX) 4 MG tablet  Spouse/Significant Other No Yes   Sig: Take 0.5 tablets (2 mg) by mouth 3 times daily as needed for muscle spasms      Facility-Administered Medications: None          Allergy:     Allergies   Allergen Reactions     Macrodantin [Nitrofurantoin] Hives     Sulfa Drugs           Inpatient Medications:   Scheduled Meds:    calcium carbonate  1,000 mg Oral Daily      polyethylene glycol  17 g Oral Daily     senna-docusate  1 tablet Oral BID     sodium chloride (PF)  3 mL Intracatheter Q8H     PRN Meds: acetaminophen, HYDROmorphone, ketorolac, lidocaine 4%, lidocaine (buffered or not buffered), melatonin, naloxone **OR** naloxone **OR** naloxone **OR** naloxone, ondansetron **OR** ondansetron, oxyCODONE, sodium chloride, sodium chloride (PF), tiZANidine        Review of Systems    Otherwise noncontributory  CONSTITUTIONAL: negative for fever, chills, change in weight  INTEGUMENTARY/SKIN: no rash or obvious new lesions  ENT/MOUTH: no sore throat, she does have new sinus pain  RESP: No pleuretic pain, No cough, no hemoptysis, No SOB   CV: negative for chest pain, palpitations or peripheral edema  GI: no nausea, vomiting, change in stools  : no dysuria or hematuria  MUSCULOSKELETAL: no myalgias, arthralgias or join efffusion  ENDOCRINE: no history of polyuria, polydyspsia or symptoms of thyroid dysfunction  PSYCHIATRIC: no change in mood stable  LYMPHATIC: no new lymphadenopathy  HEME: no bleeding or easy bruisability  NEURO: see HPI       Physical Exam:   Physical Exam   Vitals:  Height:Data Unavailable  Weight:120 lbs 0 oz   Temp: 97.8  F (36.6  C) Temp src: Oral BP: 127/49 Pulse: 54   Resp: 16 SpO2: 96 % O2 Device: None (Room air)    General Appearance:  No acute distress  Neuro:       Mental Status Exam:    Alert and oriented to place but not floor.  Oriented to month and year, few days off with the date.  Speaks softly but articulation and word finding is intact.       Cranial Nerves:   Vision-some difficulty with peripheral visual field left greater than right eye.  Pupils are equal and reactive to light.  Extraocular movements intact.  Facial strength and sensation is normal (to cold and pinprick.  No jaw deviation.  Tongue midline.  Shoulder elevation and neck movement is intact.  Hearing is intact bilaterally.  Fundus: Technically difficult       Motor:   Tone bulk and  strength intact x4.           Reflexes: Symmetrically intact.  No clonus.  Plantar signs normal       Sensory: Vibration cold sense intact in all 4 extremities                  Coordination: Intact x4       Gait: Not tested due to degree of pain  Neck: no nuchal rigidity, normal thyroid. No carotid bruits.    Cardiovascular: Regular rate and rhythm, no m/r/g  Extremities: No clubbing, no cyanosis, no edema       Data:   ROUTINE IP LABS   CBC RESULTS:     Recent Labs   Lab 12/29/21  0236 12/24/21  0616   WBC 7.9 8.8   RBC 3.50* 3.52*   HGB 10.9* 10.8*   HCT 32.0* 33.2*    269     Basic Metabolic Panel:   Recent Labs   Lab Test 12/29/21  1249 12/29/21  0236 12/24/21  1159 12/23/21  1153 12/23/21  0620 12/22/21  1259 12/22/21  0635 12/21/21  0635 11/01/21  1327   * 123* 139   < >  --    < >  --    < > 130*   POTASSIUM  --  4.7  --   --   --   --   --   --  3.9   CHLORIDE  --  92*  --   --   --   --   --   --  100   CO2  --  25  --   --   --   --   --   --  23   BUN  --  18  --   --   --   --   --   --  12   CR  --  0.88  --   --   --   --   --   --  0.81   GLC  --  87  --   --  105*  --  109*  --  86   LA  --  8.4*  --   --   --   --   --   --  8.9    < > = values in this interval not displayed.     Liver panel:  Recent Labs   Lab Test 12/29/21 0236   PROTTOTAL 7.1   ALBUMIN 3.4   BILITOTAL 0.4   ALKPHOS 84   AST 40   ALT 25     Lipid Profile:  Recent Labs   Lab Test 03/22/21  1216 09/29/16  0822 01/13/14  0851   CHOL 279* 229* 202*   HDL 86 69 57   * 148* 134*   TRIG 76 62 54   CHOLHDLRATIO  --   --  3.5     Thyroid Panel:  Recent Labs   Lab Test 12/29/21  0236 12/22/21  0627 11/01/21  1327   TSH 0.44 0.31* 1.15   T4  --  6.1  0.73* 0.57*             IMAGING:   All imaging studies were reviewed personally  CT head: 12/29/21  -IMPRESSION:  1.  Redemonstrated postoperative changes consistent with a transsphenoidal sella/suprasellar tumor resection with a cystic resection cavity in the  sella/suprasellar region. Findings are grossly similar to the prior MRI.  2.  No definite acute intracranial hemorrhage      Time:  70minutes evaluation and management.     Mile Restrepo M.D.  [facility]  Office [phone]

## 2021-12-30 ENCOUNTER — APPOINTMENT (OUTPATIENT)
Dept: PHYSICAL THERAPY | Facility: CLINIC | Age: 66
DRG: 920 | End: 2021-12-30
Attending: STUDENT IN AN ORGANIZED HEALTH CARE EDUCATION/TRAINING PROGRAM
Payer: COMMERCIAL

## 2021-12-30 LAB
ANION GAP SERPL CALCULATED.3IONS-SCNC: 7 MMOL/L (ref 3–14)
BUN SERPL-MCNC: 27 MG/DL (ref 7–30)
CALCIUM SERPL-MCNC: 8.4 MG/DL (ref 8.5–10.1)
CHLORIDE BLD-SCNC: 95 MMOL/L (ref 94–109)
CO2 SERPL-SCNC: 24 MMOL/L (ref 20–32)
CORTIS SERPL-MCNC: 10.4 UG/DL (ref 4–22)
CREAT SERPL-MCNC: 0.78 MG/DL (ref 0.52–1.04)
ERYTHROCYTE [DISTWIDTH] IN BLOOD BY AUTOMATED COUNT: 12.9 % (ref 10–15)
GFR SERPL CREATININE-BSD FRML MDRD: 83 ML/MIN/1.73M2
GLUCOSE BLD-MCNC: 53 MG/DL (ref 70–99)
HCT VFR BLD AUTO: 31.9 % (ref 35–47)
HGB BLD-MCNC: 10.6 G/DL (ref 11.7–15.7)
MCH RBC QN AUTO: 30.9 PG (ref 26.5–33)
MCHC RBC AUTO-ENTMCNC: 33.2 G/DL (ref 31.5–36.5)
MCV RBC AUTO: 93 FL (ref 78–100)
PLATELET # BLD AUTO: 307 10E3/UL (ref 150–450)
POTASSIUM BLD-SCNC: 4.3 MMOL/L (ref 3.4–5.3)
RBC # BLD AUTO: 3.43 10E6/UL (ref 3.8–5.2)
SODIUM SERPL-SCNC: 124 MMOL/L (ref 133–144)
SODIUM SERPL-SCNC: 125 MMOL/L (ref 133–144)
SODIUM SERPL-SCNC: 125 MMOL/L (ref 133–144)
SODIUM SERPL-SCNC: 126 MMOL/L (ref 133–144)
SODIUM SERPL-SCNC: 127 MMOL/L (ref 133–144)
WBC # BLD AUTO: 7.9 10E3/UL (ref 4–11)

## 2021-12-30 PROCEDURE — 97116 GAIT TRAINING THERAPY: CPT | Mod: GP

## 2021-12-30 PROCEDURE — 36415 COLL VENOUS BLD VENIPUNCTURE: CPT | Performed by: STUDENT IN AN ORGANIZED HEALTH CARE EDUCATION/TRAINING PROGRAM

## 2021-12-30 PROCEDURE — 36415 COLL VENOUS BLD VENIPUNCTURE: CPT | Performed by: INTERNAL MEDICINE

## 2021-12-30 PROCEDURE — 250N000013 HC RX MED GY IP 250 OP 250 PS 637: Performed by: STUDENT IN AN ORGANIZED HEALTH CARE EDUCATION/TRAINING PROGRAM

## 2021-12-30 PROCEDURE — 84295 ASSAY OF SERUM SODIUM: CPT | Performed by: INTERNAL MEDICINE

## 2021-12-30 PROCEDURE — 120N000001 HC R&B MED SURG/OB

## 2021-12-30 PROCEDURE — 258N000003 HC RX IP 258 OP 636: Performed by: INTERNAL MEDICINE

## 2021-12-30 PROCEDURE — 97161 PT EVAL LOW COMPLEX 20 MIN: CPT | Mod: GP

## 2021-12-30 PROCEDURE — 99232 SBSQ HOSP IP/OBS MODERATE 35: CPT | Performed by: STUDENT IN AN ORGANIZED HEALTH CARE EDUCATION/TRAINING PROGRAM

## 2021-12-30 PROCEDURE — 84295 ASSAY OF SERUM SODIUM: CPT | Performed by: STUDENT IN AN ORGANIZED HEALTH CARE EDUCATION/TRAINING PROGRAM

## 2021-12-30 PROCEDURE — 250N000013 HC RX MED GY IP 250 OP 250 PS 637: Performed by: PSYCHIATRY & NEUROLOGY

## 2021-12-30 PROCEDURE — 250N000013 HC RX MED GY IP 250 OP 250 PS 637: Performed by: INTERNAL MEDICINE

## 2021-12-30 PROCEDURE — 85027 COMPLETE CBC AUTOMATED: CPT | Performed by: INTERNAL MEDICINE

## 2021-12-30 PROCEDURE — 82533 TOTAL CORTISOL: CPT | Performed by: STUDENT IN AN ORGANIZED HEALTH CARE EDUCATION/TRAINING PROGRAM

## 2021-12-30 PROCEDURE — 97530 THERAPEUTIC ACTIVITIES: CPT | Mod: GP

## 2021-12-30 PROCEDURE — 250N000011 HC RX IP 250 OP 636: Performed by: STUDENT IN AN ORGANIZED HEALTH CARE EDUCATION/TRAINING PROGRAM

## 2021-12-30 RX ORDER — SODIUM CHLORIDE 1 G/1
1 TABLET ORAL
Status: DISCONTINUED | OUTPATIENT
Start: 2021-12-30 | End: 2022-01-01 | Stop reason: HOSPADM

## 2021-12-30 RX ORDER — SODIUM CHLORIDE 9 MG/ML
INJECTION, SOLUTION INTRAVENOUS CONTINUOUS
Status: DISCONTINUED | OUTPATIENT
Start: 2021-12-30 | End: 2021-12-31

## 2021-12-30 RX ADMIN — KETOROLAC TROMETHAMINE 15 MG: 15 INJECTION, SOLUTION INTRAMUSCULAR; INTRAVENOUS at 10:08

## 2021-12-30 RX ADMIN — GABAPENTIN 400 MG: 300 CAPSULE ORAL at 16:21

## 2021-12-30 RX ADMIN — GABAPENTIN 300 MG: 300 CAPSULE ORAL at 08:46

## 2021-12-30 RX ADMIN — SODIUM CHLORIDE: 9 INJECTION, SOLUTION INTRAVENOUS at 01:44

## 2021-12-30 RX ADMIN — POLYETHYLENE GLYCOL 3350 17 G: 17 POWDER, FOR SOLUTION ORAL at 08:46

## 2021-12-30 RX ADMIN — CALCIUM CARBONATE (ANTACID) CHEW TAB 500 MG 1000 MG: 500 CHEW TAB at 08:46

## 2021-12-30 RX ADMIN — SODIUM CHLORIDE TAB 1 GM 1 G: 1 TAB at 18:49

## 2021-12-30 RX ADMIN — SODIUM CHLORIDE: 9 INJECTION, SOLUTION INTRAVENOUS at 17:13

## 2021-12-30 RX ADMIN — SENNOSIDES AND DOCUSATE SODIUM 1 TABLET: 50; 8.6 TABLET ORAL at 21:07

## 2021-12-30 RX ADMIN — ACETAMINOPHEN 1000 MG: 500 TABLET, FILM COATED ORAL at 12:34

## 2021-12-30 RX ADMIN — ENOXAPARIN SODIUM 40 MG: 40 INJECTION SUBCUTANEOUS at 10:08

## 2021-12-30 RX ADMIN — GABAPENTIN 400 MG: 300 CAPSULE ORAL at 21:06

## 2021-12-30 RX ADMIN — SENNOSIDES AND DOCUSATE SODIUM 1 TABLET: 50; 8.6 TABLET ORAL at 08:46

## 2021-12-30 RX ADMIN — KETOROLAC TROMETHAMINE 15 MG: 15 INJECTION, SOLUTION INTRAMUSCULAR; INTRAVENOUS at 22:07

## 2021-12-30 RX ADMIN — ACETAMINOPHEN 1000 MG: 500 TABLET, FILM COATED ORAL at 05:04

## 2021-12-30 RX ADMIN — SODIUM CHLORIDE TAB 1 GM 1 G: 1 TAB at 13:48

## 2021-12-30 ASSESSMENT — ACTIVITIES OF DAILY LIVING (ADL)
ADLS_ACUITY_SCORE: 5
ADLS_ACUITY_SCORE: 8
ADLS_ACUITY_SCORE: 5
ADLS_ACUITY_SCORE: 8
ADLS_ACUITY_SCORE: 5
ADLS_ACUITY_SCORE: 8

## 2021-12-30 NOTE — PROVIDER NOTIFICATION
MD Notification    Notified Person: MD    Notified Person Name: Kristopher Kraft    Notification Date/Time: 12/30/21 0120 am    Notification Interaction: Amcom    Purpose of Notification: Na trending down, wondering if should start any kind of replacement?    Orders Received: NS 75 mL/hr, q4 Na checks    Comments:

## 2021-12-30 NOTE — PROGRESS NOTES
Lab notified about 1800 Na draw. Tech attempted x 2 without success. Another tech will be coming to the floor to try again.     Rosalba Michael RN on 12/29/2021 at 6:46 PM

## 2021-12-30 NOTE — PROGRESS NOTES
[facility]    Medicine Progress Note - Hospitalist Service       Date of Admission:  12/29/2021    Assessment & Plan         Diana Thompson is 66, who underwent a transsphenoidal resection of a suprasellar mass about a week ago, returns with a syncopal episode, hyponatremia, severe bilateral facial pain, being admitted for further treatment.    Hyponatremia.   - urine labs with elevated Na and osmolarity consistent with SIADH however serum Na improved with fluids and not with fluid restriction, more consistent with cerebral salt wasting from her surgery.    > cerebral salt wasting self resolves, will continue supportive care with close monitoring of Na today while on low rate IVF. May consider starting salt tabs as well    Bilateral facial pain, headache.    - neuropathic in nature however not typical for trigeminal neurologia    > good response with gabapentin and patient reports resolution in pain over the night, appreciate neurology assistance in her pain control    Recent pituitary adenoma resection.    - no leak per nsgy and patient is cleared to discharge from their perspective       Diet: Combination Diet Regular Diet Adult  Fluid restriction 1500 ML FLUID    DVT Prophylaxis: Enoxaparin (Lovenox) SQ  Perez Catheter: Not present  Central Lines: None  Code Status: Full Code      Disposition Plan   Expected Discharge: 12/31/2021     Anticipated discharge location:  home with family help however with have PT evaluate today.     The patient's care was discussed with the patient and nurse    Alessia Alex,   Hospitalist Service  [facility]  Securely message with the Vocera Web Console (learn more here)  Text page via [employer] Paging/Directory        Clinically Significant Risk Factors Present on Admission                 ______________________________________________________________________    Interval History   Patient laying in bed and just waking up. She reports  her headache is starting to come back however she did have pain relief over the night. Remains bilateral. No nausea but not much of an appetite. She was dizzy yesterday upon standing. No issues breathing. No dysuria.    Data reviewed today: I reviewed all medications, new labs and imaging results over the last 24 hours.   Physical Exam   Vital Signs: Temp: 97.2  F (36.2  C) Temp src: Axillary BP: 102/46 Pulse: 65   Resp: 18 SpO2: 95 % O2 Device: None (Room air)    Weight: 126 lbs 0 oz     Constitutional: Awake, alert, cooperative, laying in dark room  Respiratory: Clear to auscultation bilaterally, no crackles or wheezing  Cardiovascular: Regular rate and rhythm, normal S1 and S2, and no murmur noted  GI: Normal bowel sounds, soft, non-distended, non-tender  Skin/Integumen: No rashes, no cyanosis, no edema  Neuro: moving all extremities, no weakness and normal strength    Data   Recent Labs   Lab 12/30/21  0527 12/30/21  0243 12/29/21  2351 12/29/21  1249 12/29/21  0236 12/24/21  1159 12/24/21  0616   WBC 7.9  --   --   --  7.9  --  8.8   HGB 10.6*  --   --   --  10.9*  --  10.8*   MCV 93  --   --   --  91  --  94     --   --   --  300  --  269   *  126* 125* 124*   < > 123*   < > 141   POTASSIUM 4.3  --   --   --  4.7  --   --    CHLORIDE 95  --   --   --  92*  --   --    CO2 24  --   --   --  25  --   --    BUN 27  --   --   --  18  --   --    CR 0.78  --   --   --  0.88  --   --    ANIONGAP 7  --   --   --  6  --   --    LA 8.4*  --   --   --  8.4*  --   --    GLC 53*  --   --   --  87  --   --    ALBUMIN  --   --   --   --  3.4  --   --    PROTTOTAL  --   --   --   --  7.1  --   --    BILITOTAL  --   --   --   --  0.4  --   --    ALKPHOS  --   --   --   --  84  --   --    ALT  --   --   --   --  25  --   --    AST  --   --   --   --  40  --   --     < > = values in this interval not displayed.     No results found for this or any previous visit (from the past 24 hour(s)).  Medications     sodium  chloride 75 mL/hr at 12/30/21 0846       calcium carbonate  1,000 mg Oral Daily     gabapentin  300 mg Oral TID     polyethylene glycol  17 g Oral Daily     senna-docusate  1 tablet Oral BID     sodium chloride (PF)  3 mL Intracatheter Q8H

## 2021-12-30 NOTE — PLAN OF CARE
Pt A&Ox4, lethargic and quiet but wakes to voice. VSS on RA except Tmax 101.8F. Headache overnight.  Stby w/gb walker.  One instance of dizziness and weakness around midnight. Na trending down (126 -> 125) around midnight, provider ordered Ns@75ml/hr. Na increased to 125 by 0243. Drank 450 mL since 7 pm yesterday. Neuros intact, sensitive to light, no nasal drainage. Neurology following. Discharge pending.

## 2021-12-30 NOTE — PROGRESS NOTES
[facility]    Neurosurgery Progress Note    Date of Service (when I saw the patient): 12/30/2021     Assessment & Plan   Diana Thompson is a 66 year old female who was admitted on 12/29/2021 with headache, sinus/jaw pain, and syncopal episode at home. 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 Pickens and Dr. Davis. Head CT with post op changes, no acute intracranial hemorrhage. Patient also has hyponatremia, most recent sodium 126. On exam, patient is awake, alert, oriented, somewhat drowsy. She is able to follow commands and moves all extremities. No nasal drainage noted on exam today and she states she has not had any drainage since admission. Drainage was seurosanguanous in appearance.  Nasal splint intact.     Active Problems:    Plan   - Patient admitted for correction of sodium. Appreciate assistance from Hospitalist.   - Continue neuro checks and pain control measures   - From NSG standpoint, okay to discharge once medically stable   - Follow up as scheduled:               Dr. Pickens on 1/10              Dr. Davis on 1/17     I have discussed the following assessment and plan Dr. Mcknight who is in agreement with initial plan and will follow up with further consultation recommendations.    Mariela Diaz CNP  [facility]  [place] [address]    Tel [phone]  Pager [phone]      Interval History   Stable.    Physical Exam   Temp: 97.2  F (36.2  C) Temp src: Axillary BP: 102/46 Pulse: 65   Resp: 18 SpO2: 95 % O2 Device: None (Room air)    Vitals:    12/29/21 0158 12/30/21 0707   Weight: 120 lb (54.4 kg) 126 lb (57.2 kg)     Vital Signs with Ranges  Temp:  [97.2  F (36.2  C)-101.8  F (38.8  C)] 97.2  F (36.2  C)  Pulse:  [54-85] 65  Resp:  [16-18] 18  BP: ()/(46-60) 102/46  SpO2:  [95 %-98 %] 95 %  I/O last 3 completed shifts:  In: 650  [P.O.:650]  Out: 600 [Urine:600]     , Blood pressure 102/46, pulse 65, temperature 97.2  F (36.2  C), temperature source Axillary, resp. rate 18, weight 126 lb (57.2 kg), SpO2 95 %, not currently breastfeeding.  126 lbs 0 oz  HEENT:  Normocephalic.  PERRLA.  EOM s intact.    Heart:  No peripheral edema  Lungs:  No SOB  Skin:  Warm and dry, good capillary refill.  Extremities:  Good radial and dorsalis pedis pulses bilaterally, no edema, cyanosis or clubbing.    NEUROLOGICAL EXAMINATION:   Mental status:  Alert and Oriented x 3, speech is fluent.  Cranial nerves:  II-XII intact.   Motor:  Strength is 5/5 throughout the upper and lower extremities  Shoulder Abduction:  Right:  5/5   Left:  5/5  Biceps:                      Right:  5/5   Left:  5/5  Triceps:                     Right:  5/5   Left:  5/5  Wrist Extensors:       Right:  5/5   Left:  5/5  Wrist Flexors:           Right:  5/5   Left:  5/5  interosseus :            Right:  5/5   Left:  5/5   Hip Flexor:                Right: 5/5  Left:  5/5  Hip Adductor:             Right:  5/5  Left:  5/5  Hip Abductor:             Right:  5/5  Left:  5/5  Gastroc Soleus:        Right:  5/5  Left:  5/5  Tib/Ant:                      Right:  5/5  Left:  5/5  EHL:                     Right:  5/5  Left:  5/5  Sensation:  intact  Coordination:  Smooth finger to nose testing.   Negative pronator drift.    Medications     sodium chloride 75 mL/hr at 12/30/21 0846       calcium carbonate  1,000 mg Oral Daily     enoxaparin ANTICOAGULANT  40 mg Subcutaneous Q24H     gabapentin  300 mg Oral TID     polyethylene glycol  17 g Oral Daily     senna-docusate  1 tablet Oral BID     sodium chloride (PF)  3 mL Intracatheter Q8H     sodium chloride  1 g Oral TID w/meals       Data     CBC RESULTS:   Recent Labs   Lab Test 12/30/21  0527   WBC 7.9   RBC 3.43*   HGB 10.6*   HCT 31.9*   MCV 93   MCH 30.9   MCHC 33.2   RDW 12.9        Basic Metabolic Panel:  Lab Results   Component  Value Date     12/30/2021      Lab Results   Component Value Date    POTASSIUM 4.3 12/30/2021     Lab Results   Component Value Date    CHLORIDE 95 12/30/2021     Lab Results   Component Value Date    LA 8.4 12/30/2021     Lab Results   Component Value Date    CO2 24 12/30/2021     Lab Results   Component Value Date    BUN 27 12/30/2021     Lab Results   Component Value Date    CR 0.78 12/30/2021     Lab Results   Component Value Date    GLC 53 12/30/2021    GLC 82 03/22/2021     INR:  No results found for: INR

## 2021-12-30 NOTE — PROGRESS NOTES
[facility]  Neurology Daily Note      Admission Date:12/29/2021   Date of service: 12/30/2021   Hospital Day: 2                                                   Assessment and Plan:   #. 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 Description not entirely c/w trigeminal neuralgia. Pain level decreased, not using narcotics.     Increased gabapentin for pain management.    (Preferred over carbamazepine/oxcarbazepine as will not worsen hyponatremia.)  Prn toradol/narcotics if needed.      Consider repeat MRI if no improvement in pain or other neurologic signs or symptoms occur  #. Hyponatremia, c/w cerebral salt wasting following intracranial surgery  --mgmt per hospitalist.  Likely contributing to fatigue/weakness  #. 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   --monitor  #. DVT Prophylaxis  --per hospitalist service          Interval History:   More comfortable today.   Pain more localized to left>right forehead today. Described constant component of aching.  Occasional sharp stabbing pain.  Does not really describe electric or burning quality.  Not worse with talking/movement.         Review of Systems:   noncontributory       Medications:   Scheduled Meds:    calcium carbonate  1,000 mg Oral Daily     enoxaparin ANTICOAGULANT  40 mg Subcutaneous Q24H     gabapentin  400 mg Oral TID     polyethylene glycol  17 g Oral Daily     senna-docusate  1 tablet Oral BID     sodium chloride (PF)  3 mL Intracatheter Q8H     sodium chloride  1 g Oral TID w/meals     PRN Meds: acetaminophen, HYDROmorphone, ketorolac, lidocaine 4%, lidocaine (buffered or not buffered), melatonin, naloxone **OR** naloxone **OR** naloxone **OR** naloxone, ondansetron **OR** ondansetron, oxyCODONE, sodium chloride, sodium chloride (PF), tiZANidine        Physical Exam:   Vitals: Temp: 97.2  F (36.2  C) Temp src:  Axillary BP: 102/46 Pulse: 65   Resp: 18 SpO2: 95 % O2 Device: None (Room air)    Vital Signs with Ranges: Temp:  [97.2  F (36.2  C)-101.8  F (38.8  C)] 97.2  F (36.2  C)  Pulse:  [54-85] 65  Resp:  [16-18] 18  BP: ()/(46-60) 102/46  SpO2:  [95 %-98 %] 95 %    General Appearance:  No acute distress  Neuro:       Mental Status Exam:   Alert and oriented to Month/date, floor (not room).  Slow speech/language.       Cranial Nerves:   Bitemporal visual impairment. EOMI, sensation intact. Facial strength intact.  Tongue midline.  Hearing intact.          Motor:   Tone bulk and strength intact           Reflexes:  Ix4       Sensory:  Pin I x4                  Coordination:  Slow but accurate       Gait: nt due to lethargy  Extremities: No clubbing, no cyanosis, no edema       Data:   ROUTINE IP LABS (Last 3results)  CBC RESULTS:     Recent Labs   Lab Test 12/30/21  0527 12/29/21  0236 12/24/21  0616   WBC 7.9 7.9 8.8   RBC 3.43* 3.50* 3.52*   HGB 10.6* 10.9* 10.8*   HCT 31.9* 32.0* 33.2*    300 269     Basic Metabolic Panel:  Recent Labs   Lab Test 12/30/21  1010 12/30/21  0527 12/30/21  0243 12/29/21  1249 12/29/21  0236 12/23/21  1153 12/23/21  0620 12/21/21  0635 11/01/21  1327   * 126*  126* 125*   < > 123*   < >  --    < > 130*   POTASSIUM  --  4.3  --   --  4.7  --   --   --  3.9   CHLORIDE  --  95  --   --  92*  --   --   --  100   CO2  --  24  --   --  25  --   --   --  23   BUN  --  27  --   --  18  --   --   --  12   CR  --  0.78  --   --  0.88  --   --   --  0.81   GLC  --  53*  --   --  87  --  105*   < > 86   LA  --  8.4*  --   --  8.4*  --   --   --  8.9    < > = values in this interval not displayed.     Liver panel:  Recent Labs   Lab Test 12/29/21 0236   PROTTOTAL 7.1   ALBUMIN 3.4   BILITOTAL 0.4   ALKPHOS 84   AST 40   ALT 25     Thyroid Panel:  Recent Labs   Lab Test 12/29/21  0236 12/22/21  0627 11/01/21  1327   TSH 0.44 0.31* 1.15   T4  --  6.1  0.73* 0.57*              TIME      15minutes Evaluation/management time     Mile Restrepo M.D.  Neurologist  [facility]  Office [phone]

## 2021-12-30 NOTE — PROGRESS NOTES
X-cover    Noted Na 126->126>124. Will start NS at 75 ml hour, q4 hour Na checks for now    Kristopher Kraft MD

## 2021-12-30 NOTE — PROGRESS NOTES
Lab notified about 1800 Na draw. Lab has been claimed and tech will be here soon to collect.    Rosalba Michael RN on 12/29/2021 at 6:16 PM

## 2021-12-30 NOTE — PLAN OF CARE
Summary: Headache, sinus/jaw pain, syncopal episode, and hyponatremia   DATE & TIME: 12/29/2021, 8631-4208  Cognitive Concerns/ Orientation : A & O x 4. Soft spoken, lethargic/sleepy most of shift, but easily wakes to voice. Follows commands appropriately.Calm and cooperative   BEHAVIOR & AGGRESSION TOOL COLOR: Green   ABNL VS/O2: VSS on RA ex bradycardic at times, spot check pulse ox (Sats have remained stable in upper 90s)   MOBILITY: SBA w/ GB, FALL RISK   PAIN MANAGMENT: reporting bilateral medial sharp HA pain/denies jaw pain, managed well with rest, quiet environment, PRN toradol and scheduled gabapentin. Resting comfortable between cares.   DIET: Regular, fair appetite, 1500 fluid restriction-just had sips of water  BOWEL/BLADDER: BS active x 4, continent   ABNL LAB/BG: Sq=005, still waiting for 1800 lab draw-lab notified (Q6h checks per MD note, next check at [place]), hgb=10.9, cortisol check in am  DRAIN/DEVICES: PIV/SL   TELEMETRY RHYTHM: NA   SKIN: pale, intact   TESTS/PROCEDURES: NA  D/C DATE: pending  Discharge Barriers: clinical improvement, neuro consult complete   OTHER IMPORTANT INFO: neuros intact at this time. Sensitive to light. Denies numbness/tingling to UE/LE bilaterally. No nasal drainage at this time. Neurology consulted.

## 2021-12-30 NOTE — PROGRESS NOTES
12/30/21 1620   Quick Adds   Type of Visit Initial PT Evaluation   Living Environment   People in home spouse   Current Living Arrangements house   Home Accessibility stairs to enter home   Number of Stairs, Main Entrance 2   Stair Railings, Main Entrance none   Number of Stairs, Within Home, Primary greater than 10 stairs   Stair Railings, Within Home, Primary railings safe and in good condition   Living Environment Comments Patient reported that she has a tub and walk-in shower - has built in shower chair. Patient has supportive  who provides assistance as needed.    Self-Care   Usual Activity Tolerance good   Current Activity Tolerance fair   Equipment Currently Used at Home none   Activity/Exercise/Self-Care Comment Independent at baseline with mobility and ADLs   Disability/Function   Fall history within last six months yes   Number of times patient has fallen within last six months 3  (trips on feet/toes when walking)   General Information   Onset of Illness/Injury or Date of Surgery 12/29/21   Referring Physician Alessia Alex, DO   Patient/Family Therapy Goals Statement (PT) to return home with spouse   Pertinent History of Current Problem (include personal factors and/or comorbidities that impact the POC) Diana Thompson is a 66-year-old female who recently underwent 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  This took place on 12/21/2021 with the cooperation of ENT.  The patient was subsequently discharged on 12/24/2021 in stable condition.  She was discharged with oxycodone and Zanaflex.  The patient came to [facility] Emergency Department due to headache and specifically facial pain.   Existing Precautions/Restrictions fall   Cognition   Orientation Status (Cognition) oriented x 4   Pain Assessment   Patient Currently in Pain Yes, see Vital Sign flowsheet  (Patient reported some pain in head/headache)   Strength   Manual Muscle Testing Quick Adds  Strength WFL   Bed Mobility   Comment (Bed Mobility) SBA with supine to sit transfers - use of bedrail to assist in upright trunk posture at EOB   Transfers   Transfer Safety Comments CGA with sit to stand and use of FWW - patient moving with decreased velocity   Gait/Stairs (Locomotion)   [place] Level (Gait) contact guard   Assistive Device (Gait) walker, front-wheeled   Distance in Feet (Required for LE Total Joints) 3x20'   Comment (Gait/Stairs) Blood pressure assessed while sitting at EOB (121/60 mmHg) and while standing pre and post ambulation (117/58 mmHg)    Clinical Impression   Criteria for Skilled Therapeutic Intervention yes, treatment indicated   PT Diagnosis (PT) impaired mobility    Influenced by the following impairments weakness, impaired balance   Functional limitations due to impairments reduced activity tolerance, fall risk, impaired functional mobility   Clinical Presentation Stable/Uncomplicated   Clinical Presentation Rationale clinical judgement   Clinical Decision Making (Complexity) low complexity   Therapy Frequency (PT) Daily   Predicted Duration of Therapy Intervention (days/wks) 5 days   Planned Therapy Interventions (PT) balance training;bed mobility training;gait training;strengthening;patient/family education;neuromuscular re-education;stair training;transfer training   Anticipated Equipment Needs at Discharge (PT) walker, rolling   Risk & Benefits of therapy have been explained evaluation/treatment results reviewed;care plan/treatment goals reviewed;risks/benefits reviewed;current/potential barriers reviewed;participants voiced agreement with care plan;participants included;patient   PT Discharge Planning    PT Discharge Recommendation (DC Rec) home with home care physical therapy   PT Rationale for DC Rec Anticipating that patient will return home with HHPT and initial supervision for mobility provided by . Suggesting use of FWW at home initially in order to optimize  safety and avoid falls.    PT Brief overview of current status  CGA-SBA for all mobility with use of FWW   Total Evaluation Time   Total Evaluation Time (Minutes) 12

## 2021-12-31 ENCOUNTER — APPOINTMENT (OUTPATIENT)
Dept: PHYSICAL THERAPY | Facility: CLINIC | Age: 66
DRG: 920 | End: 2021-12-31
Payer: COMMERCIAL

## 2021-12-31 LAB
SODIUM SERPL-SCNC: 129 MMOL/L (ref 133–144)
SODIUM SERPL-SCNC: 130 MMOL/L (ref 133–144)
SODIUM SERPL-SCNC: 131 MMOL/L (ref 133–144)

## 2021-12-31 PROCEDURE — 250N000011 HC RX IP 250 OP 636: Performed by: STUDENT IN AN ORGANIZED HEALTH CARE EDUCATION/TRAINING PROGRAM

## 2021-12-31 PROCEDURE — 97116 GAIT TRAINING THERAPY: CPT | Mod: GP | Performed by: PHYSICAL THERAPIST

## 2021-12-31 PROCEDURE — 120N000001 HC R&B MED SURG/OB

## 2021-12-31 PROCEDURE — 250N000013 HC RX MED GY IP 250 OP 250 PS 637: Performed by: STUDENT IN AN ORGANIZED HEALTH CARE EDUCATION/TRAINING PROGRAM

## 2021-12-31 PROCEDURE — 84295 ASSAY OF SERUM SODIUM: CPT | Performed by: STUDENT IN AN ORGANIZED HEALTH CARE EDUCATION/TRAINING PROGRAM

## 2021-12-31 PROCEDURE — 99232 SBSQ HOSP IP/OBS MODERATE 35: CPT | Performed by: STUDENT IN AN ORGANIZED HEALTH CARE EDUCATION/TRAINING PROGRAM

## 2021-12-31 PROCEDURE — 250N000013 HC RX MED GY IP 250 OP 250 PS 637: Performed by: INTERNAL MEDICINE

## 2021-12-31 PROCEDURE — 36415 COLL VENOUS BLD VENIPUNCTURE: CPT | Performed by: STUDENT IN AN ORGANIZED HEALTH CARE EDUCATION/TRAINING PROGRAM

## 2021-12-31 PROCEDURE — 258N000003 HC RX IP 258 OP 636: Performed by: INTERNAL MEDICINE

## 2021-12-31 PROCEDURE — 250N000013 HC RX MED GY IP 250 OP 250 PS 637: Performed by: PSYCHIATRY & NEUROLOGY

## 2021-12-31 PROCEDURE — 97530 THERAPEUTIC ACTIVITIES: CPT | Mod: GP | Performed by: PHYSICAL THERAPIST

## 2021-12-31 RX ORDER — BUTALBITAL, ACETAMINOPHEN AND CAFFEINE 50; 325; 40 MG/1; MG/1; MG/1
1 TABLET ORAL EVERY 4 HOURS PRN
Status: DISCONTINUED | OUTPATIENT
Start: 2021-12-31 | End: 2022-01-01 | Stop reason: HOSPADM

## 2021-12-31 RX ADMIN — BUTALBITAL, ACETAMINOPHEN AND CAFFEINE 1 TABLET: 50; 325; 40 TABLET ORAL at 17:05

## 2021-12-31 RX ADMIN — SENNOSIDES AND DOCUSATE SODIUM 1 TABLET: 50; 8.6 TABLET ORAL at 09:25

## 2021-12-31 RX ADMIN — SENNOSIDES AND DOCUSATE SODIUM 1 TABLET: 50; 8.6 TABLET ORAL at 21:14

## 2021-12-31 RX ADMIN — CALCIUM CARBONATE (ANTACID) CHEW TAB 500 MG 1000 MG: 500 CHEW TAB at 09:25

## 2021-12-31 RX ADMIN — GABAPENTIN 400 MG: 300 CAPSULE ORAL at 17:05

## 2021-12-31 RX ADMIN — ENOXAPARIN SODIUM 40 MG: 40 INJECTION SUBCUTANEOUS at 09:25

## 2021-12-31 RX ADMIN — POLYETHYLENE GLYCOL 3350 17 G: 17 POWDER, FOR SOLUTION ORAL at 09:25

## 2021-12-31 RX ADMIN — GABAPENTIN 400 MG: 300 CAPSULE ORAL at 09:25

## 2021-12-31 RX ADMIN — OXYCODONE HYDROCHLORIDE 5 MG: 5 TABLET ORAL at 02:35

## 2021-12-31 RX ADMIN — SODIUM CHLORIDE TAB 1 GM 1 G: 1 TAB at 09:25

## 2021-12-31 RX ADMIN — SODIUM CHLORIDE TAB 1 GM 1 G: 1 TAB at 12:51

## 2021-12-31 RX ADMIN — ACETAMINOPHEN 1000 MG: 500 TABLET, FILM COATED ORAL at 01:25

## 2021-12-31 RX ADMIN — GABAPENTIN 400 MG: 300 CAPSULE ORAL at 21:14

## 2021-12-31 RX ADMIN — SODIUM CHLORIDE: 9 INJECTION, SOLUTION INTRAVENOUS at 07:04

## 2021-12-31 RX ADMIN — SODIUM CHLORIDE TAB 1 GM 1 G: 1 TAB at 17:05

## 2021-12-31 ASSESSMENT — ACTIVITIES OF DAILY LIVING (ADL)
ADLS_ACUITY_SCORE: 8
ADLS_ACUITY_SCORE: 7
ADLS_ACUITY_SCORE: 8
ADLS_ACUITY_SCORE: 5
ADLS_ACUITY_SCORE: 5
ADLS_ACUITY_SCORE: 7
ADLS_ACUITY_SCORE: 8
ADLS_ACUITY_SCORE: 7
ADLS_ACUITY_SCORE: 7
ADLS_ACUITY_SCORE: 8
ADLS_ACUITY_SCORE: 5
ADLS_ACUITY_SCORE: 7
ADLS_ACUITY_SCORE: 8
ADLS_ACUITY_SCORE: 7
ADLS_ACUITY_SCORE: 8
ADLS_ACUITY_SCORE: 8
ADLS_ACUITY_SCORE: 7
ADLS_ACUITY_SCORE: 5
ADLS_ACUITY_SCORE: 7
ADLS_ACUITY_SCORE: 8

## 2021-12-31 NOTE — PROGRESS NOTES
[facility]    Medicine Progress Note - Hospitalist Service       Date of Admission:  12/29/2021    Assessment & Plan         Diana Thompson is 66, who underwent a transsphenoidal resection of a suprasellar mass about a week ago, returns with a syncopal episode, hyponatremia, severe bilateral facial pain, being admitted for further treatment.    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    > cerebral salt wasting self resolves, slow improvement in Na since admission however remains on IVF.  > Stop IVF and trend Na off fluids today. If remains stable/slow increase possible discharge tomorrow with close outpatient lab follow up. Continue salt tabs.    Bilateral facial pain, headache.    - neuropathic in nature however not typical for trigeminal neurologia    > pain remains but is improving. Neurology increased gabapentin yesterday. Will also add on fiorcet today. Continue to follow for pain improvement.  > encourage OOB and ambulation, PT recommending home therapy however suspect patient will improve with better pain control    Recent pituitary adenoma resection.    - no leak per nsgy and patient is cleared to discharge from their perspective       Diet: Combination Diet Regular Diet Adult  Fluid restriction 1500 ML FLUID    DVT Prophylaxis: Enoxaparin (Lovenox) SQ  Perez Catheter: Not present  Central Lines: None  Code Status: Full Code      Disposition Plan   Expected Discharge: 01/01/2022     Anticipated discharge location:  home with family help and possibly home health.    The patient's care was discussed with the patient and nurse    Alessia Alex,   Hospitalist Service  [facility]  Securely message with the Vocera Web Console (learn more here)  Text page via Sound[employer]  Paging/Directory        Clinically Significant Risk Factors Present on Admission                 ______________________________________________________________________    Interval History   Patient more awake this morning and able to hold a conversation. She reports at the moment no headache. No other focal neuro findings. No cough. She remains generally weak. No cough. Updated on plan today.    Data reviewed today: I reviewed all medications, new labs and imaging results over the last 24 hours.   Physical Exam   Vital Signs: Temp: (!) 95.8  F (35.4  C) Temp src: Oral BP: 125/57 Pulse: 59   Resp: 16 SpO2: 95 % O2 Device: None (Room air)    Weight: 126 lbs 0 oz     Constitutional: Awake, alert, cooperative,   Respiratory: Clear to auscultation bilaterally, no crackles or wheezing  Cardiovascular: Regular rate and rhythm, normal S1 and S2, and no murmur noted  GI: Normal bowel sounds, soft, non-distended, non-tender  Skin/Integumen: No rashes, no cyanosis, no edema  Neuro: moving all extremities, no weakness and normal strength, CN2-12 grossly intact did not assess gait    Data   Recent Labs   Lab 12/30/21  1828 12/30/21  1535 12/30/21  1010 12/30/21  0527 12/29/21  1249 12/29/21  0236   WBC  --   --   --  7.9  --  7.9   HGB  --   --   --  10.6*  --  10.9*   MCV  --   --   --  93  --  91   PLT  --   --   --  307  --  300   * 126* 125* 126*  126*   < > 123*   POTASSIUM  --   --   --  4.3  --  4.7   CHLORIDE  --   --   --  95  --  92*   CO2  --   --   --  24  --  25   BUN  --   --   --  27  --  18   CR  --   --   --  0.78  --  0.88   ANIONGAP  --   --   --  7  --  6   LA  --   --   --  8.4*  --  8.4*   GLC  --   --   --  53*  --  87   ALBUMIN  --   --   --   --   --  3.4   PROTTOTAL  --   --   --   --   --  7.1   BILITOTAL  --   --   --   --   --  0.4   ALKPHOS  --   --   --   --   --  84   ALT  --   --   --   --   --  25   AST  --   --   --   --   --  40    < > = values in this interval not displayed.     No  results found for this or any previous visit (from the past 24 hour(s)).  Medications       calcium carbonate  1,000 mg Oral Daily     enoxaparin ANTICOAGULANT  40 mg Subcutaneous Q24H     gabapentin  400 mg Oral TID     polyethylene glycol  17 g Oral Daily     senna-docusate  1 tablet Oral BID     sodium chloride (PF)  3 mL Intracatheter Q8H     sodium chloride  1 g Oral TID w/meals

## 2021-12-31 NOTE — PROGRESS NOTES
[facility]    Neurosurgery Progress Note    Date of Service (when I saw the patient): 12/31/2021     Assessment & Plan   Diana Thompson is a 66 year old female who was admitted on 12/29/2021 with headache, sinus/jaw pain, and syncopal episode at home. 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 Pickens and Dr. Davis. Head CT with post op changes, no acute intracranial hemorrhage. Patient also has hyponatremia, most recent sodium 131. On exam, patient is much more alert this morning. She is able to follow commands and moves all extremities. No nasal drainage noted on exam today and she states she has not had any drainage since admission.  Nasal splint intact.     Active Problems:    Plan   - Patient admitted for correction of sodium. Appreciate assistance from Hospitalist.   - Continue neuro checks and pain control measures   - From NSG standpoint, okay to discharge once medically stable   - Follow up as scheduled:               Dr. Pickens on 1/10              Dr. Davis on 1/17     I have discussed the following assessment and plan Dr. Mcknight who is in agreement with initial plan and will follow up with further consultation recommendations.    Mariela Diaz CNP  [facility] Neurosurgery  [place] [address]    Tel [phone]  Pager [phone]      Interval History   Stable.    Physical Exam   Temp: (!) 95.8  F (35.4  C) Temp src: Oral BP: 125/57 Pulse: 59   Resp: 16 SpO2: 95 % O2 Device: None (Room air)    Vitals:    12/29/21 0158 12/30/21 0707   Weight: 54.4 kg (120 lb) 57.2 kg (126 lb)     Vital Signs with Ranges  Temp:  [95.8  F (35.4  C)-96.9  F (36.1  C)] 95.8  F (35.4  C)  Pulse:  [59-64] 59  Resp:  [16-18] 16  BP: (125-142)/(44-57) 125/57  SpO2:  [95 %-99 %] 95 %  I/O last 3 completed shifts:  In: 1521 [P.O.:300; I.V.:1221]  Out: 100 [Urine:100]     , Blood  pressure 125/57, pulse 59, temperature (!) 95.8  F (35.4  C), temperature source Oral, resp. rate 16, weight 57.2 kg (126 lb), SpO2 95 %, not currently breastfeeding.  126 lbs 0 oz  HEENT:  Normocephalic.  PERRLA.  EOM s intact.    Heart:  No peripheral edema  Lungs:  No SOB  Skin:  Warm and dry, good capillary refill.  Extremities:  Good radial and dorsalis pedis pulses bilaterally, no edema, cyanosis or clubbing.    NEUROLOGICAL EXAMINATION:   Mental status:  Alert and Oriented x 3, speech is fluent.  Cranial nerves:  II-XII intact.   Motor:  Strength is 5/5 throughout the upper and lower extremities  Shoulder Abduction:  Right:  5/5   Left:  5/5  Biceps:                      Right:  5/5   Left:  5/5  Triceps:                     Right:  5/5   Left:  5/5  Wrist Extensors:       Right:  5/5   Left:  5/5  Wrist Flexors:           Right:  5/5   Left:  5/5  interosseus :            Right:  5/5   Left:  5/5   Hip Flexor:                Right: 5/5  Left:  5/5  Hip Adductor:             Right:  5/5  Left:  5/5  Hip Abductor:             Right:  5/5  Left:  5/5  Gastroc Soleus:        Right:  5/5  Left:  5/5  Tib/Ant:                      Right:  5/5  Left:  5/5  EHL:                     Right:  5/5  Left:  5/5  Sensation:  intact  Coordination:  Smooth finger to nose testing.   Negative pronator drift.    Medications       calcium carbonate  1,000 mg Oral Daily     enoxaparin ANTICOAGULANT  40 mg Subcutaneous Q24H     gabapentin  400 mg Oral TID     polyethylene glycol  17 g Oral Daily     senna-docusate  1 tablet Oral BID     sodium chloride (PF)  3 mL Intracatheter Q8H     sodium chloride  1 g Oral TID w/meals       Data     CBC RESULTS:   Recent Labs   Lab Test 12/30/21  0527   WBC 7.9   RBC 3.43*   HGB 10.6*   HCT 31.9*   MCV 93   MCH 30.9   MCHC 33.2   RDW 12.9        Basic Metabolic Panel:  Lab Results   Component Value Date     12/30/2021      Lab Results   Component Value Date    POTASSIUM 4.3  12/30/2021     Lab Results   Component Value Date    CHLORIDE 95 12/30/2021     Lab Results   Component Value Date    LA 8.4 12/30/2021     Lab Results   Component Value Date    CO2 24 12/30/2021     Lab Results   Component Value Date    BUN 27 12/30/2021     Lab Results   Component Value Date    CR 0.78 12/30/2021     Lab Results   Component Value Date    GLC 53 12/30/2021    GLC 82 03/22/2021     INR:  No results found for: INR

## 2021-12-31 NOTE — PROGRESS NOTES
[facility]  Neurology Daily Note      Admission Date:12/29/2021   Date of service: 12/31/2021   Hospital Day: 3                                                   Assessment and Plan:   #. Bilateral facial pain and head pain, improved.  Occurring in aftermath of endoscopic endonasal transsphenoidal resection of suprasellar mass.   Presenting sx of bifrontal and facial pain was not consistent with classic trigeminal neuralgia; however, patients description today of right cheek electrical quality pain intermittently would be more consistent with right V2 distribution.  This is a reported condition related to transphenoidal surgery and pituitary lesions.     Continue gabapentin for pain management.    (Preferred over carbamazepine/oxcarbazepine as will not worsen hyponatremia.)  Prn toradol/narcotics if needed.      Consider repeat MRI if no improvement in pain or other neurologic signs or symptoms occur  #. Hyponatremia, c/w cerebral salt wasting/SIADH following intracranial surgery  --mgmt per hospitalist.  Likely contributing to fatigue/weakness which is improving.  #. 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   --monitor  #. DVT Prophylaxis  --per hospitalist service          Interval History:   Much more comfortable today.   Today described brief electric shock pain in right maxillary region, duration seconds, happening every 5-15min.  Not specifically related to talking and chewing.    Minimal frontal pain.  Reports balance difficulty, but improving, ambulates with assistance/walker.         Review of Systems:   noncontributory       Medications:   Scheduled Meds:    calcium carbonate  1,000 mg Oral Daily     enoxaparin ANTICOAGULANT  40 mg Subcutaneous Q24H     gabapentin  400 mg Oral TID     polyethylene glycol  17 g Oral Daily     senna-docusate  1 tablet Oral BID     sodium chloride (PF)  3 mL Intracatheter Q8H     sodium chloride  1 g Oral TID w/meals     PRN  Meds: acetaminophen, butalbital-acetaminophen-caffeine, HYDROmorphone, ketorolac, lidocaine 4%, lidocaine (buffered or not buffered), melatonin, naloxone **OR** naloxone **OR** naloxone **OR** naloxone, ondansetron **OR** ondansetron, oxyCODONE, sodium chloride, sodium chloride (PF), tiZANidine        Physical Exam:   Vitals: Temp: (!) 95.8  F (35.4  C) Temp src: Oral BP: 125/57 Pulse: 59   Resp: 16 SpO2: 95 % O2 Device: None (Room air)    Vital Signs with Ranges: Temp:  [95.8  F (35.4  C)-96.9  F (36.1  C)] 95.8  F (35.4  C)  Pulse:  [59-64] 59  Resp:  [16-18] 16  BP: (125-142)/(44-57) 125/57  SpO2:  [95 %-99 %] 95 %    General Appearance:  No acute distress; no dental lesion  Neuro:       Mental Status Exam:   Alert and oriented to place and time.  Language and speech improved.     Cranial Nerves:   Bitemporal visual impairment. EOMI, sensation intact. Facial strength intact.  No jaw weakness.Tongue midline.  Hearing intact.          Motor:   Tone bulk and strength intact           Reflexes:  Ix4       Sensory:  Pin I x4                  Coordination:  Slow but accurate       Gait: nt due to lethargy  Extremities: No clubbing, no cyanosis, no edema       Data:   ROUTINE IP LABS (Last 3results)  CBC RESULTS:     Recent Labs   Lab Test 12/30/21  0527 12/29/21  0236 12/24/21  0616   WBC 7.9 7.9 8.8   RBC 3.43* 3.50* 3.52*   HGB 10.6* 10.9* 10.8*   HCT 31.9* 32.0* 33.2*    300 269     Basic Metabolic Panel:  Recent Labs   Lab Test 12/30/21  1828 12/30/21  1535 12/30/21  1010 12/30/21  0527 12/29/21  1249 12/29/21  0236 12/23/21  1153 12/23/21  0620 12/21/21  0635 11/01/21  1327   * 126* 125* 126*  126*   < > 123*   < >  --    < > 130*   POTASSIUM  --   --   --  4.3  --  4.7  --   --   --  3.9   CHLORIDE  --   --   --  95  --  92*  --   --   --  100   CO2  --   --   --  24  --  25  --   --   --  23   BUN  --   --   --  27  --  18  --   --   --  12   CR  --   --   --  0.78  --  0.88  --   --   --  0.81    GLC  --   --   --  53*  --  87  --  105*   < > 86   LA  --   --   --  8.4*  --  8.4*  --   --   --  8.9    < > = values in this interval not displayed.     Liver panel:  Recent Labs   Lab Test 12/29/21  0236   PROTTOTAL 7.1   ALBUMIN 3.4   BILITOTAL 0.4   ALKPHOS 84   AST 40   ALT 25     Thyroid Panel:  Recent Labs   Lab Test 12/29/21  0236 12/22/21  0627 11/01/21  1327   TSH 0.44 0.31* 1.15   T4  --  6.1  0.73* 0.57*              TIME     40minutes Evaluation/management time (research of Trigeminal nerve anatomy/TN)    Mile Restrepo M.D.  Neurologist  [facility] Neurology  Office [phone]

## 2021-12-31 NOTE — PLAN OF CARE
Summary: Headache, sinus/jaw pain, syncopal episode, and hyponatremia   DATE & TIME: 12/30/21 53494-5820  Cognitive Concerns/ Orientation : A & O x 4. Soft spoken, lethargic/sleepy most of shift, but easily wakes to voice. Follows commands appropriately.Calm and cooperative   BEHAVIOR & AGGRESSION TOOL COLOR: Green   ABNL VS/O2: VSS on RA    MOBILITY: SBA w/ GB and walker, FALL RISK .    PAIN MANAGMENT: reporting bilateral intermittent frontal sharp HA pain, managed well with rest, quiet environment, PRN toradol, tylenol and scheduled gabapentin. Resting comfortable between cares.   DIET: Regular, fair appetite, 1500 fluid restriction.  Appetite better in the afternoon  BOWEL/BLADDER: BS active x 4, continent.  No BM this shift  ABNL LAB/BG: Ss=630, cortisol WNL  DRAIN/DEVICES: IVF NS@75ml/hr  TELEMETRY RHYTHM: NA   SKIN: pale, intact   TESTS/PROCEDURES: NA  D/C DATE: pending  Discharge Barriers: clinical improvement, neuro consult complete   OTHER IMPORTANT INFO: neuros intact at this time. Sensitive to light. Denies numbness/tingling to UE/LE bilaterally. No nasal drainage at this time. Neurology consulted.       Pt A&Ox4, lethargic and quiet but wakes to voice. More awake today per .  VSS.  Stby w/gb walker.  Intermittent dizziness while lying in bed.  Up in chair and ambulated to bathroom x1.  Used commode due to weakness. Neuros intact, sensitive to light, no nasal drainage. Neurology following. Discharge pending.

## 2022-01-01 VITALS
OXYGEN SATURATION: 97 % | RESPIRATION RATE: 16 BRPM | BODY MASS INDEX: 21.63 KG/M2 | SYSTOLIC BLOOD PRESSURE: 130 MMHG | DIASTOLIC BLOOD PRESSURE: 56 MMHG | HEART RATE: 62 BPM | WEIGHT: 126 LBS | TEMPERATURE: 97.1 F

## 2022-01-01 LAB — SODIUM SERPL-SCNC: 130 MMOL/L (ref 133–144)

## 2022-01-01 PROCEDURE — 250N000013 HC RX MED GY IP 250 OP 250 PS 637: Performed by: STUDENT IN AN ORGANIZED HEALTH CARE EDUCATION/TRAINING PROGRAM

## 2022-01-01 PROCEDURE — 99239 HOSP IP/OBS DSCHRG MGMT >30: CPT | Performed by: STUDENT IN AN ORGANIZED HEALTH CARE EDUCATION/TRAINING PROGRAM

## 2022-01-01 PROCEDURE — 99024 POSTOP FOLLOW-UP VISIT: CPT | Performed by: PHYSICIAN ASSISTANT

## 2022-01-01 PROCEDURE — 250N000011 HC RX IP 250 OP 636: Performed by: STUDENT IN AN ORGANIZED HEALTH CARE EDUCATION/TRAINING PROGRAM

## 2022-01-01 PROCEDURE — 36415 COLL VENOUS BLD VENIPUNCTURE: CPT | Performed by: STUDENT IN AN ORGANIZED HEALTH CARE EDUCATION/TRAINING PROGRAM

## 2022-01-01 PROCEDURE — 250N000013 HC RX MED GY IP 250 OP 250 PS 637: Performed by: PSYCHIATRY & NEUROLOGY

## 2022-01-01 PROCEDURE — 84295 ASSAY OF SERUM SODIUM: CPT | Performed by: STUDENT IN AN ORGANIZED HEALTH CARE EDUCATION/TRAINING PROGRAM

## 2022-01-01 PROCEDURE — 250N000013 HC RX MED GY IP 250 OP 250 PS 637: Performed by: INTERNAL MEDICINE

## 2022-01-01 RX ORDER — GABAPENTIN 400 MG/1
400 CAPSULE ORAL 3 TIMES DAILY
Qty: 21 CAPSULE | Refills: 0 | Status: SHIPPED | OUTPATIENT
Start: 2022-01-01 | End: 2022-01-06

## 2022-01-01 RX ORDER — BUTALBITAL, ACETAMINOPHEN AND CAFFEINE 50; 325; 40 MG/1; MG/1; MG/1
1 TABLET ORAL EVERY 4 HOURS PRN
Qty: 10 TABLET | Refills: 0 | Status: SHIPPED | OUTPATIENT
Start: 2022-01-01 | End: 2022-01-06

## 2022-01-01 RX ORDER — SODIUM CHLORIDE 1 G/1
1 TABLET ORAL
Qty: 30 TABLET | Refills: 0 | Status: SHIPPED | OUTPATIENT
Start: 2022-01-01 | End: 2022-01-11

## 2022-01-01 RX ADMIN — GABAPENTIN 400 MG: 300 CAPSULE ORAL at 09:03

## 2022-01-01 RX ADMIN — CALCIUM CARBONATE (ANTACID) CHEW TAB 500 MG 1000 MG: 500 CHEW TAB at 09:03

## 2022-01-01 RX ADMIN — SODIUM CHLORIDE TAB 1 GM 1 G: 1 TAB at 09:03

## 2022-01-01 RX ADMIN — BUTALBITAL, ACETAMINOPHEN AND CAFFEINE 1 TABLET: 50; 325; 40 TABLET ORAL at 05:42

## 2022-01-01 RX ADMIN — ENOXAPARIN SODIUM 40 MG: 40 INJECTION SUBCUTANEOUS at 09:02

## 2022-01-01 RX ADMIN — SENNOSIDES AND DOCUSATE SODIUM 1 TABLET: 50; 8.6 TABLET ORAL at 09:03

## 2022-01-01 RX ADMIN — POLYETHYLENE GLYCOL 3350 17 G: 17 POWDER, FOR SOLUTION ORAL at 09:04

## 2022-01-01 RX ADMIN — SODIUM CHLORIDE TAB 1 GM 1 G: 1 TAB at 13:02

## 2022-01-01 RX ADMIN — BUTALBITAL, ACETAMINOPHEN AND CAFFEINE 1 TABLET: 50; 325; 40 TABLET ORAL at 13:02

## 2022-01-01 ASSESSMENT — ACTIVITIES OF DAILY LIVING (ADL)
ADLS_ACUITY_SCORE: 5

## 2022-01-01 NOTE — PLAN OF CARE
Discharge Note    Patient discharged to home via private vehicle  accompanied by significant other .  IV: Discontinued  Prescriptions filled and given to patient/family.   Belongings reviewed and sent with patient.   Home medications returned to patient: NA  Equipment sent with: N/A.   patient verbalizes understanding of discharge instructions. AVS given to patient.

## 2022-01-01 NOTE — PROGRESS NOTES
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  Admitted on 12/29/2021 with headache, sinus and jaw pain, and syncopal episode.  Found to be hyponatremic.  Sodium has been corrected, most recent value was 130 today.  Patient denies any drainage from her nose, denies any headaches currently, says she feels tired.    Exam    Alert and oriented no acute distress lying comfortably in bed  Bilateral upper and lower extremities appropriate strength  Negative pronator drift, finger-nose slow and accurate bilaterally  Extraocular wounds intact  Pupils equal reactive to light   No nasal drainage    Assessment    Status post endoscopic endonasal transsphenoidal resection of suprasellar mass  Cerebral salt wasting  Hyponatremia    Plan    Okay to discharge from neurosurgery standpoint when medically stable,  Sodium correction per hospitalist  Follow-up with Dr. Pickens on 1/10/2022, Dr. Davis 1/17/2022

## 2022-01-01 NOTE — PLAN OF CARE
A&O x4.  Regular diet, fair appetite, 1500 ml fluid restriction.  Na trending up at 131; Q6H checks.  Headache pain improved today. Fioracet given x 1 with good effect. Neuros intact, sensitive to light, no nasal drainage. Neurology following, discharge pending improvement. VSS.

## 2022-01-01 NOTE — PLAN OF CARE
A&O x4, VSS on RA.  Regular diet, fair appetite, 1500 ml fluid restriction. Up with A1 + GBW. Na slowly trending down to 129; Q6H checks. C/o facial pain 7/10 - PRN Fioracet given x1 with stated improvement.  Neurology following, discharge pending improvement.

## 2022-01-01 NOTE — DISCHARGE SUMMARY
[facility]  Hospitalist Discharge Summary      Date of Admission:  12/29/2021  Date of Discharge:  1/1/2022  Discharging Provider: Alessia Alex DO      Discharge Diagnoses    See below    Follow-ups Needed After Discharge   Follow-up Appointments     Follow-up and recommended labs and tests       Follow up with primary care provider, Isis Hernández, within 7-14 days for   hospital follow- up.      Please schedule a lab draw in the next 7 days to track your sodium level.                 Discharge Disposition   Discharged to home  Condition at discharge: Stable      Hospital Course   Diana Thompson is 66, who underwent a transsphenoidal resection of a suprasellar mass about a week ago and presented to the ED with a syncopal episode, hyponatremia, severe bilateral facial pain, and was admitted for further treatment.     Hyponatremia  Na 139 on 12/25 but on admission here 12/29 Na found to be 123  - urine labs returned with elevated Na and osmolarity consistent with SIADH however serum Na improved with fluids and not with fluid restriction, more consistent with cerebral salt wasting from her surgery.  - slow improvement with IVF and salt tabs. Stable Na ~130 over 24 hours off IVF on. Will discharge with salt tabs and outpatient BMP in 1 week to assess Na levels  - weak on discharge will have outpatient PT ordered     Bilateral facial pain, headache.    - neuropathic in nature however not typical for trigeminal neurologia  - neurology started gabapentin, will slowly wean off this week  - fiorcet PRN also helped     Recent pituitary adenoma resection.    - no leak per nsgy and patient is cleared to discharge from their perspective    Consultations This Hospital Stay   NEUROLOGY IP CONSULT  PHYSICAL THERAPY ADULT IP CONSULT    Code Status   Full Code    Time Spent on this Encounter   IAlessia DO, personally saw the patient today and spent greater than 30 minutes discharging this patient.        Alessia Alex, [place] [address] [facility]  [address]  [place] [address]  Phone: [phone]  ______________________________________________________________________    Physical Exam   Vital Signs: Temp: 97.1  F (36.2  C) Temp src: Oral BP: 130/56 Pulse: 62   Resp: 16 SpO2: 97 % O2 Device: None (Room air)    Weight: 126 lbs 0 oz     Constitutional: Awake, alert, cooperative,   Respiratory: Clear to auscultation bilaterally, no crackles or wheezing  Cardiovascular: Regular rate and rhythm, normal S1 and S2, and no murmur noted  GI: Normal bowel sounds, soft, non-distended, non-tender  Skin/Integumen: No rashes, no cyanosis, no edema  Neuro: moving all extremities, no weakness and normal strength, CN2-12 grossly intact did not assess gait       Primary Care Physician   Isis Hernández    Discharge Orders      Physical Therapy Referral      Reason for your hospital stay    You presented for fainting and were found to have a very low sodium. You required supplementation of your sodium and pain medications for your headache. Continue gabapentin 3 times daily for the next 2 day and then wean off this medication by reducing 1 pill a day over the next week until you are no longer taking any. If your pain increases go back to 3 pills daily and call your PCP for a refill. Continue salt tabs until you run out.     Follow-up and recommended labs and tests     Follow up with primary care provider, Isis Hernández, within 7-14 days for hospital follow- up.      Please schedule a lab draw in the next 7 days to track your sodium level.     Activity    Your activity upon discharge: activity as tolerated     Activity    Your activity upon discharge: activity as tolerated     Diet    Follow this diet upon discharge: Orders Placed This Encounter      Fluid restriction 1500 ML FLUID      Combination Diet Regular Diet Adult     Diet    Follow this diet upon discharge: Orders Placed This Encounter       Combination Diet Regular Diet Adult       Significant Results and Procedures   Most Recent 3 CBC's:Recent Labs   Lab Test 12/30/21  0527 12/29/21  0236 12/24/21  0616   WBC 7.9 7.9 8.8   HGB 10.6* 10.9* 10.8*   MCV 93 91 94    300 269     Most Recent 3 BMP's:Recent Labs   Lab Test 01/01/22  0637 12/31/21  2220 12/31/21  1814 12/30/21  1010 12/30/21  0527 12/29/21  1249 12/29/21  0236 12/23/21  1153 12/23/21  0620 12/21/21  0635 11/01/21  1327   * 129* 130*   < > 126*  126*   < > 123*   < >  --    < > 130*   POTASSIUM  --   --   --   --  4.3  --  4.7  --   --   --  3.9   CHLORIDE  --   --   --   --  95  --  92*  --   --   --  100   CO2  --   --   --   --  24  --  25  --   --   --  23   BUN  --   --   --   --  27  --  18  --   --   --  12   CR  --   --   --   --  0.78  --  0.88  --   --   --  0.81   ANIONGAP  --   --   --   --  7  --  6  --   --   --  7   LA  --   --   --   --  8.4*  --  8.4*  --   --   --  8.9   GLC  --   --   --   --  53*  --  87  --  105*   < > 86    < > = values in this interval not displayed.     Most Recent Urinalysis:Recent Labs   Lab Test 12/29/21  1138   COLOR Light Yellow   APPEARANCE Slightly Cloudy*   URINEGLC Negative   URINEBILI Negative   URINEKETONE 10 *   SG 1.016   UBLD Negative   URINEPH 7.0   PROTEIN 10 *   NITRITE Negative   LEUKEST Negative   RBCU 2   WBCU 9*        Results for orders placed or performed during the hospital encounter of 12/29/21   Head CT w/o contrast    Narrative    EXAM: CT HEAD W/O CONTRAST  LOCATION: [facility]  DATE/TIME: 12/29/2021 2:40 AM    INDICATION: Headache post pituitary surgery  COMPARISON: 12/22/2021 brain MRI  TECHNIQUE: Routine CT Head without IV contrast. Multiplanar reformats. Dose reduction techniques were used.    FINDINGS:  INTRACRANIAL CONTENTS: Redemonstrated postoperative changes of a transsphenoidal sella/suprasellar tumor resection. Redemonstrated cystic resection cavity in the sella/suprasellar  region. No definite acute intracranial hemorrhage. No CT evidence of acute   infarct. Mild presumed chronic small vessel ischemic changes. Mild generalized volume loss. No hydrocephalus.     VISUALIZED ORBITS/SINUSES/MASTOIDS: No intraorbital abnormality. Patchy opacification of paranasal sinuses with packing material. No middle ear or mastoid effusion.    BONES/SOFT TISSUES: Postsurgical changes of the sella.      Impression    IMPRESSION:  1.  Redemonstrated postoperative changes consistent with a transsphenoidal sella/suprasellar tumor resection with a cystic resection cavity in the sella/suprasellar region. Findings are grossly similar to the prior MRI.  2.  No definite acute intracranial hemorrhage.       Discharge Medications   Current Discharge Medication List      START taking these medications    Details   butalbital-acetaminophen-caffeine (ESGIC) -40 MG tablet Take 1 tablet by mouth every 4 hours as needed for headaches  Qty: 10 tablet, Refills: 0    Associated Diagnoses: Status post transsphenoidal pituitary resection (H)      gabapentin (NEURONTIN) 400 MG capsule Take 1 capsule (400 mg) by mouth 3 times daily for 7 days  Qty: 21 capsule, Refills: 0    Associated Diagnoses: Status post transsphenoidal pituitary resection (H)      sodium chloride 1 GM tablet Take 1 tablet (1 g) by mouth 3 times daily (with meals) for 10 days  Qty: 30 tablet, Refills: 0    Associated Diagnoses: Status post transsphenoidal pituitary resection (H)         CONTINUE these medications which have NOT CHANGED    Details   acetaminophen (TYLENOL) 500 MG tablet Take 1,000 mg by mouth every 6 hours as needed for pain      calcium gluconate 500 MG tablet Take 1,000 mg by mouth daily      Multiple Vitamins-Minerals (MULTIVITAMIN ADULTS PO) Take 1-2 Tablespoonful by mouth      oxyCODONE (ROXICODONE) 5 MG tablet Take 1 tablet (5 mg) by mouth every 4 hours as needed for pain  Qty: 30 tablet, Refills: 0    Associated Diagnoses:  Status post transsphenoidal pituitary resection (H)      senna-docusate (SENOKOT-S/PERICOLACE) 8.6-50 MG tablet Take 1 tablet by mouth 2 times daily  Qty: 20 tablet, Refills: 0    Associated Diagnoses: Status post transsphenoidal pituitary resection (H)      sodium chloride (OCEAN) 0.65 % nasal spray Spray 1 spray into both nostrils daily as needed for congestion      tiZANidine (ZANAFLEX) 4 MG tablet Take 0.5 tablets (2 mg) by mouth 3 times daily as needed for muscle spasms  Qty: 20 tablet, Refills: 0    Associated Diagnoses: Status post transsphenoidal pituitary resection (H)           Allergies   Allergies   Allergen Reactions     Macrodantin [Nitrofurantoin] Hives     Sulfa Drugs

## 2022-01-02 DIAGNOSIS — Z71.89 OTHER SPECIFIED COUNSELING: ICD-10-CM

## 2022-01-02 NOTE — PLAN OF CARE
"Physical Therapy Discharge Summary    Reason for therapy discharge:    Discharged to home with home therapy.    Progress towards therapy goal(s). See goals on Care Plan in [facility] electronic health record for goal details.  Goals partially met.  Barriers to achieving goals:   discharge from facility.    Therapy recommendation(s):    Per last PT note: \"Anticipating that patient will return home with HHPT and initial supervision for mobility provided by . Suggesting use of FWW at home initially in order to optimize safety and avoid falls. \"        "

## 2022-01-03 ENCOUNTER — PATIENT OUTREACH (OUTPATIENT)
Dept: CARE COORDINATION | Facility: CLINIC | Age: 67
End: 2022-01-03
Payer: COMMERCIAL

## 2022-01-03 ENCOUNTER — TELEPHONE (OUTPATIENT)
Dept: FAMILY MEDICINE | Facility: CLINIC | Age: 67
End: 2022-01-03
Payer: COMMERCIAL

## 2022-01-03 NOTE — TELEPHONE ENCOUNTER
Will wait for PCP recommendations then will follow up with pt.    Radha FLANNERY RN, BSN  [facility]

## 2022-01-03 NOTE — PROGRESS NOTES
Clinic Care Coordination Contact  [facility]/Voicemail       Clinical Data: Care Coordinator Outreach  Outreach attempted x 1.  Left message on patient's voicemail with call back information and requested return call.  Plan: Care Coordinator will try to reach patient again in 1-2 business days.      Jennifer Toussaint  Care Transitions Assistant  [facility]

## 2022-01-03 NOTE — TELEPHONE ENCOUNTER
"S-(situation): Today per  \"she is so lethargic, she was better and now so lethargic\" She istaking butalbital and gabapentin as well . She knows her name, she can walk,  has belt around her to be safe, She might be able to walk but she walks slow. She can speak and breath normally  Per pt \"I am doing pretty good\"  Her eyes are blurry - blurry vision started today- just a little while ago  No pain anywhere now \"it is under control\" per pt      B-(background):pt got out of the hospital on Saturday, after surgery her sodium levels crashed and she was taken to ED by ambulance.    she took one tablet at 6am of bultalbital  Gabapentin at 620 AM and again at 1PM. She is to take this three times a day  She is not as bad as when they hadto call the ambulance . She had collapsed then.   PT has 1/6 appt with Dr Hernández  These meds are all new to her after surgery on Dec 21 for pituitary surgery  She is taking salt tablets too  He did give her tylenol today    A-(assessment): will route to PCP - may be overmedicated and her pain is not bothering her    R-(recommendations):  RN advised to hold off butalbital until hears from pcp.  advised if she becomes like she was when 911 was called to call 911 again.     Margarita Bazzi RN, BSN  [facility]         "

## 2022-01-04 ENCOUNTER — HOSPITAL ENCOUNTER (EMERGENCY)
Facility: CLINIC | Age: 67
Discharge: HOME | End: 2022-01-04
Attending: EMERGENCY MEDICINE | Admitting: EMERGENCY MEDICINE
Payer: COMMERCIAL

## 2022-01-04 VITALS
TEMPERATURE: 97.1 F | RESPIRATION RATE: 18 BRPM | BODY MASS INDEX: 21.34 KG/M2 | WEIGHT: 125 LBS | SYSTOLIC BLOOD PRESSURE: 107 MMHG | HEART RATE: 65 BPM | OXYGEN SATURATION: 100 % | HEIGHT: 64 IN | DIASTOLIC BLOOD PRESSURE: 52 MMHG

## 2022-01-04 DIAGNOSIS — E87.1 HYPONATREMIA: ICD-10-CM

## 2022-01-04 DIAGNOSIS — E86.0 DEHYDRATION: ICD-10-CM

## 2022-01-04 DIAGNOSIS — R53.1 WEAKNESS: ICD-10-CM

## 2022-01-04 LAB
ALBUMIN SERPL-MCNC: 3.4 G/DL (ref 3.4–5)
ALP SERPL-CCNC: 112 U/L (ref 40–150)
ALT SERPL W P-5'-P-CCNC: 34 U/L (ref 0–50)
ANION GAP SERPL CALCULATED.3IONS-SCNC: 4 MMOL/L (ref 3–14)
AST SERPL W P-5'-P-CCNC: 43 U/L (ref 0–45)
BASOPHILS # BLD AUTO: 0 10E3/UL (ref 0–0.2)
BASOPHILS NFR BLD AUTO: 1 %
BILIRUB SERPL-MCNC: 0.2 MG/DL (ref 0.2–1.3)
BUN SERPL-MCNC: 17 MG/DL (ref 7–30)
CALCIUM SERPL-MCNC: 9.5 MG/DL (ref 8.5–10.1)
CHLORIDE BLD-SCNC: 101 MMOL/L (ref 94–109)
CO2 SERPL-SCNC: 28 MMOL/L (ref 20–32)
CREAT SERPL-MCNC: 0.6 MG/DL (ref 0.52–1.04)
EOSINOPHIL # BLD AUTO: 0.3 10E3/UL (ref 0–0.7)
EOSINOPHIL NFR BLD AUTO: 5 %
ERYTHROCYTE [DISTWIDTH] IN BLOOD BY AUTOMATED COUNT: 13.8 % (ref 10–15)
GFR SERPL CREATININE-BSD FRML MDRD: >90 ML/MIN/1.73M2
GLUCOSE BLD-MCNC: 84 MG/DL (ref 70–99)
HCT VFR BLD AUTO: 30.7 % (ref 35–47)
HGB BLD-MCNC: 10.2 G/DL (ref 11.7–15.7)
HOLD SPECIMEN: NORMAL
IMM GRANULOCYTES # BLD: 0 10E3/UL
IMM GRANULOCYTES NFR BLD: 0 %
LYMPHOCYTES # BLD AUTO: 1.2 10E3/UL (ref 0.8–5.3)
LYMPHOCYTES NFR BLD AUTO: 19 %
MCH RBC QN AUTO: 30.9 PG (ref 26.5–33)
MCHC RBC AUTO-ENTMCNC: 33.2 G/DL (ref 31.5–36.5)
MCV RBC AUTO: 93 FL (ref 78–100)
MONOCYTES # BLD AUTO: 0.4 10E3/UL (ref 0–1.3)
MONOCYTES NFR BLD AUTO: 6 %
NEUTROPHILS # BLD AUTO: 4.5 10E3/UL (ref 1.6–8.3)
NEUTROPHILS NFR BLD AUTO: 69 %
NRBC # BLD AUTO: 0 10E3/UL
NRBC BLD AUTO-RTO: 0 /100
PLATELET # BLD AUTO: 365 10E3/UL (ref 150–450)
POTASSIUM BLD-SCNC: 4.7 MMOL/L (ref 3.4–5.3)
PROT SERPL-MCNC: 7.2 G/DL (ref 6.8–8.8)
RBC # BLD AUTO: 3.3 10E6/UL (ref 3.8–5.2)
SARS-COV-2 RNA RESP QL NAA+PROBE: NEGATIVE
SODIUM SERPL-SCNC: 133 MMOL/L (ref 133–144)
WBC # BLD AUTO: 6.4 10E3/UL (ref 4–11)

## 2022-01-04 PROCEDURE — 36415 COLL VENOUS BLD VENIPUNCTURE: CPT | Performed by: EMERGENCY MEDICINE

## 2022-01-04 PROCEDURE — C9803 HOPD COVID-19 SPEC COLLECT: HCPCS

## 2022-01-04 PROCEDURE — 85025 COMPLETE CBC W/AUTO DIFF WBC: CPT | Performed by: EMERGENCY MEDICINE

## 2022-01-04 PROCEDURE — 87635 SARS-COV-2 COVID-19 AMP PRB: CPT | Performed by: EMERGENCY MEDICINE

## 2022-01-04 PROCEDURE — 96360 HYDRATION IV INFUSION INIT: CPT

## 2022-01-04 PROCEDURE — 82374 ASSAY BLOOD CARBON DIOXIDE: CPT | Performed by: EMERGENCY MEDICINE

## 2022-01-04 PROCEDURE — 80053 COMPREHEN METABOLIC PANEL: CPT | Performed by: EMERGENCY MEDICINE

## 2022-01-04 PROCEDURE — 258N000003 HC RX IP 258 OP 636: Performed by: EMERGENCY MEDICINE

## 2022-01-04 PROCEDURE — 250N000013 HC RX MED GY IP 250 OP 250 PS 637: Performed by: EMERGENCY MEDICINE

## 2022-01-04 PROCEDURE — 99283 EMERGENCY DEPT VISIT LOW MDM: CPT | Mod: 25

## 2022-01-04 PROCEDURE — 96361 HYDRATE IV INFUSION ADD-ON: CPT

## 2022-01-04 RX ORDER — ACETAMINOPHEN 500 MG
500 TABLET ORAL ONCE
Status: COMPLETED | OUTPATIENT
Start: 2022-01-04 | End: 2022-01-04

## 2022-01-04 RX ADMIN — ACETAMINOPHEN 500 MG: 500 TABLET, FILM COATED ORAL at 14:31

## 2022-01-04 RX ADMIN — SODIUM CHLORIDE 1000 ML: 9 INJECTION, SOLUTION INTRAVENOUS at 14:34

## 2022-01-04 ASSESSMENT — ENCOUNTER SYMPTOMS
WEAKNESS: 1
FATIGUE: 1

## 2022-01-04 ASSESSMENT — MIFFLIN-ST. JEOR: SCORE: 1087

## 2022-01-04 NOTE — TELEPHONE ENCOUNTER
calling back stating no one has contacted him and he is concerned about his wife.  Per  he stopped giving his wife the butalbital yesterday at 630 am and stopped the gabapentin yesterday at 1 pm which seemed to help some with the lethargy.  Patient still very week and  is concerned that her sodium is still too low.   has been giving wife tylenol which seems to be controlling pain so far but also wants to know what else to give her for discomfort that is not so sedating.    1Husband would like to have patient's sodium checked today if possible  2. Alternative for pain med is using tylenol already

## 2022-01-04 NOTE — TELEPHONE ENCOUNTER
Reason for call:  Other   Patient called regarding (reason for call): call back  Additional comments: He was just speaking with the Nurse and had more questions.   Diana is saying she is feeling better, he is just unsure if he should take her in or not anymore.     Phone number to reach Jeb Thompson [phone]    Best Time:      Can we leave a detailed message on this number?  YES    Travel screening: Not Applicable     Connected caller with the RN. Hawa Julien,

## 2022-01-04 NOTE — TELEPHONE ENCOUNTER
Spouse and patient calling back with updates.  Patient just woke up from a nap and is feeling a little bit better.  Is able to walk around with a walker.  However, has not taken any pain meds today but still having double vision and feels drowsy.  Advised that she should still go to ED for further eval due to recent brain surgery and continued concerning symptoms   Patient and spouse agreed.    Krystal Roy RN  [facility]

## 2022-01-04 NOTE — ED PROVIDER NOTES
History   Chief Complaint:  Generalized Weakness     The history is provided by the patient, the spouse and medical records.      Diana Thompson is a 66 year old female with history of suprasellar mass, trigeminal neuralgia, and retinal artery occlusion who presents with generalized weakness. According to the , the patient was admitted to the hospital one week ago after a syncopal event, and was found to be hyponatremic. She was discharged with Escig, Neurontin, and NaCl tablets. The patient has been taking her medications and has felt improved enough to stop taking them yesterday. She also stopped taking the Neurontin and Esgic due to a double vision side effect. She has been taking Tylenol and has continued to feel improved. However, today she felt slightly weak and was worried she would have another syncopal event. She was advised to visit the emergency department to check her sodium levels.     The patient is status post transsphenoidal pituitary resection done by Dr. Pickens on 12/21/2021. The patient states she has felt improved since her brain mass was removed.     Imaging from 12/29/2021  CT HEAD W/O CONTRAST  IMPRESSION:  1.  Redemonstrated postoperative changes consistent with a transsphenoidal sella/suprasellar tumor resection with a cystic resection cavity in the sella/suprasellar region. Findings are grossly similar to the prior MRI.  2.  No definite acute intracranial hemorrhage.    Review of Systems   Constitutional: Positive for fatigue.   Neurological: Positive for weakness. Negative for syncope.   All other systems reviewed and are negative.    Allergies:  Macrodantin  Sulfa Drugs    Medications:  Esgic  Neurontin  Roxicodone  Senokot  Zanaflex    Past Medical History:     Heart murmur  Retinal artery occlusion  Suprasellar mass  Status post transsphenoidal pituitary resection  Trigeminal neuralgia  Hyponatremia       Past Surgical History:    Endoscopic endonasal resection of suprasellar  "mass  Tubal ligation     Family History:    Mother - Diabetes Mellitus, Congestive Heart Failure  Father - COPD    Social History:  The patient was accompanied to the emergency department by her .    Physical Exam     Patient Vitals for the past 24 hrs:   BP Temp Temp src Pulse Resp SpO2 Height Weight   01/04/22 1400 111/48 -- -- 60 -- 99 % -- --   01/04/22 1350 125/61 -- -- -- -- 98 % -- --   01/04/22 1252 (!) 122/38 97.1  F (36.2  C) Temporal 76 14 100 % 1.626 m (5' 4\") 56.7 kg (125 lb)     Physical Exam  Physical Exam   Nursing note and vitals reviewed.  General: Oriented to person, place, and time. Appears well-developed and well-nourished.   Head: No signs of trauma.   Mouth/Throat: Oropharynx is clear and moist.   Eyes: Conjunctivae are normal. Pupils are equal, round, and reactive to light.   Neck: Normal range of motion. No nuchal rigidity.   Cardiovascular: Normal rate and regular rhythm.    Respiratory: Effort normal and breath sounds normal. No respiratory distress.   Abdominal: Soft. There is no tenderness. There is no guarding.   Musculoskeletal: Normal range of motion. no edema.   Neurological: The patient is alert and oriented to person, place, and time.  PERRLA, EOMI, visual fields intact, strength in upper/lower extremities normal and symmetrical.   Sensation normal. Speech normal  GCS eye subscore is 4. GCS verbal subscore is 5. GCS motor subscore is 6.   Skin: Skin is warm and dry. No rash noted.   Psychiatric: normal mood. Flat affect. behavior is normal.     Emergency Department Course   Laboratory:  Labs Ordered and Resulted from Time of ED Arrival to Time of ED Departure   CBC WITH PLATELETS AND DIFFERENTIAL - Abnormal       Result Value    WBC Count 6.4      RBC Count 3.30 (*)     Hemoglobin 10.2 (*)     Hematocrit 30.7 (*)     MCV 93      MCH 30.9      MCHC 33.2      RDW 13.8      Platelet Count 365      % Neutrophils 69      % Lymphocytes 19      % Monocytes 6      % Eosinophils 5   "    % Basophils 1      % Immature Granulocytes 0      NRBCs per 100 WBC 0      Absolute Neutrophils 4.5      Absolute Lymphocytes 1.2      Absolute Monocytes 0.4      Absolute Eosinophils 0.3      Absolute Basophils 0.0      Absolute Immature Granulocytes 0.0      Absolute NRBCs 0.0     COVID-19 VIRUS (CORONAVIRUS) BY PCR - Normal    SARS CoV2 PCR Negative     COMPREHENSIVE METABOLIC PANEL - Normal    Sodium 133      Potassium 4.7      Chloride 101      Carbon Dioxide (CO2) 28      Anion Gap 4      Urea Nitrogen 17      Creatinine 0.60      Calcium 9.5      Glucose 84      Alkaline Phosphatase 112      AST 43      ALT 34      Protein Total 7.2      Albumin 3.4      Bilirubin Total 0.2      GFR Estimate >90        Emergency Department Course:  Reviewed:  I reviewed nursing notes, vitals, past medical history and Care Everywhere    Assessments:  1421 I obtained history and examined the patient as noted above.   1554 I rechecked the patient and explained findings.    Interventions:  1431 Tylenol 500 mg PO  1434 0.9% NaCl bolus 1000 mL IV    Disposition:  The patient was discharged to home.     Impression & Plan   Medical Decision Making:  iDana Thompson is a 66 year old female who presents for evaluation of weakness.  The workup here in the emergency room was to evaluate for infections, metabolic, electrolyte, neurologic or cardiovascular causes.  Of note, there are no signs of pneumonia or UTI causing the symptoms.  In addition, I do not believe symptoms are due to CVA, TIA, myasthesia gravis, myopathy or acute coronary syndromes.  Patient has a history of pituitary surgery and resulting hyponatremia.  Her sodium is slightly low here today and she appears slightly dehydrated.  She was given 1 L of normal saline and is feeling much better.  She will follow up with her primary care doctor or return to the ER with return of her symptoms.      Diagnosis:    ICD-10-CM    1. Weakness  R53.1    2. Hyponatremia  E87.1    3.  Dehydration  E86.0      Scribe Disclosure:  I, Saritha Salgadoa, am serving as a scribe at 2:03 PM on 1/4/2022 to document services personally performed by Can Gee MD based on my observations and the provider's statements to me.     Can Gee MD  01/04/22 7195

## 2022-01-04 NOTE — PROGRESS NOTES
Clinic Care Coordination Contact    Background: Care Coordination referral placed from [facility] discharge report for reason of patient meeting criteria for a TCM outreach call by [facility] team.    Assessment: Upon chart review, [facility] Team member will cancel/close the referral for TCM outreach due to reason below:    Patient has presented to Emergency Department or has been readmitted to hospital    Plan: Care Coordination referral for TCM outreach canceled.    Shirley Toussaint MA  [facility] [place], [facility]

## 2022-01-04 NOTE — TELEPHONE ENCOUNTER
Patient's spouse notified.  He will take patient to [facility] again where she had her surgery      Krystal Roy RN  [facility]

## 2022-01-05 ENCOUNTER — HOSPITAL ENCOUNTER (OUTPATIENT)
Dept: PHYSICAL THERAPY | Facility: CLINIC | Age: 67
Setting detail: THERAPIES SERIES
End: 2022-01-05
Attending: STUDENT IN AN ORGANIZED HEALTH CARE EDUCATION/TRAINING PROGRAM
Payer: COMMERCIAL

## 2022-01-05 DIAGNOSIS — R55 SYNCOPE AND COLLAPSE: ICD-10-CM

## 2022-01-05 DIAGNOSIS — E89.3 STATUS POST TRANSSPHENOIDAL PITUITARY RESECTION (H): ICD-10-CM

## 2022-01-05 PROCEDURE — 97110 THERAPEUTIC EXERCISES: CPT | Mod: GP | Performed by: PHYSICAL THERAPIST

## 2022-01-05 PROCEDURE — 97162 PT EVAL MOD COMPLEX 30 MIN: CPT | Mod: GP | Performed by: PHYSICAL THERAPIST

## 2022-01-05 PROCEDURE — 97112 NEUROMUSCULAR REEDUCATION: CPT | Mod: GP | Performed by: PHYSICAL THERAPIST

## 2022-01-05 NOTE — PROGRESS NOTES
[facility]                                                                                   OUTPATIENT PHYSICAL THERAPY FUNCTIONAL EVALUATION  PLAN OF TREATMENT FOR OUTPATIENT REHABILITATION  (COMPLETE FOR INITIAL CLAIMS ONLY)  Patient's Last Name, First Name, M.I.  YOB: 1955  LeonidDiana kelley     Provider's Name   [facility]   Medical Record No.  [id number]     Start of Care Date:  01/05/22   Onset Date:  12/21/21   Type:     _X__PT   ____OT  ____SLP Medical Diagnosis:  balance, weakness, post pituitary resection     PT Diagnosis:  deconditioned, gait abnormality, oculomotor deficits Visits from SOC:  1                              __________________________________________________________________________________  Plan of Treatment/Functional Goals:  strengthening,balance training,neuromuscular re-education           GOALS  1  pt will improve TUG to 12sec to demonstrate improved gait and decreased fall rsik  02/16/22    2  pt will be confident  to do short shopping trips in 6wk  02/16/22                Therapy Frequency:  1 time/week   Predicted Duration of Therapy Intervention:  6wks, as sx improve, can do every other week    Kris Hoenk, PT                                    I CERTIFY THE NEED FOR THESE SERVICES FURNISHED UNDER        THIS PLAN OF TREATMENT AND WHILE UNDER MY CARE     (Physician co-signature of this document indicates review and certification of the therapy plan).                Certification Date From:  01/05/22   Certification Date To:  02/16/22    Referring Provider:  DR Alex    Initial Assessment  See Epic Evaluation- Start of Care Date: 01/05/22

## 2022-01-05 NOTE — PROGRESS NOTES
"  Diana Thompson   PHYSICAL THERAPY EVALUATION    01/05/22 0800   Quick Adds   Quick Adds Certification;Vestibular Eval   Type of Visit Initial OP PT Evaluation   General Information   Start of Care Date 01/05/22   Referring Physician DR Alex   Orders Evaluate and Treat as Indicated   Order Date 01/01/22   Medical Diagnosis post pituitary resection, balance, weakness   Onset of illness/injury or Date of Surgery 12/21/21   Pertinent history of current problem (include personal factors and/or comorbidities that impact the POC) Sodium levels keep dropping post pituitary resection 12/21/21, was doing well when first got home, then sodium dropped, fatigued, weak, balance off. Has sodium pills, and has had IV fluids in hospital. Feeling better today. Has been doing eye tracking and VOR exercises (as described by pt) Had loss of peripheral vision prior to surgery, that is resolving   Prior level of function comment walk up to mile, yardwork, garden, housework, camping   Patient role/Employment history Retired   Living environment [place]   Patient/Family Goals Statement get back to normal activity   Fall Risk Screen   Fall screen completed by PT   Have you fallen 2 or more times in the past year? Yes   Have you fallen and had an injury in the past year? Yes   Timed Up and Go score (seconds) 14.04   Is patient a fall risk? Yes;Department fall risk interventions implemented  (deconditioned)   Fall screen comments missed step, catch toe   Posture   Posture Forward head position   Gait   Gait Comments slow pace, cautious   Gait Special Tests   Gait Special Tests 25 FOOT TIMED WALK   Gait Special Tests 25 Foot Timed Walk   Seconds 8.94   Steps 15 Steps   Balance   Balance Comments able to stand FA 6\" EO and EC with horiz and vertical head turns x10 ,no sx, no LOB   Balance Special Tests   Balance Special Tests Timed up and go;Modified CTSIB Conditions   Balance Special Tests Timed Up and Go   Seconds 14.04 Seconds "   Balance Special Tests Modified CTSIB Conditions   Condition 1, seconds 30 Seconds   Condition 2, seconds 30 Seconds   Condition 4, seconds 30 Seconds   Condition 5, seconds 30 Seconds   Cervicogenic Screen   Neck ROM WFL   Oculomotor Exam   Smooth Pursuit Normal   Saccades Normal   VOR Normal   Convergence Testing Abnormal   Convergence Testing Comments 13cm L eye does not converge   Infrared Goggle Exam or Frenzel Lense Exam   Vestibular Suppressant in Last 24 Hours? No   Spontaneous Nystagmus Negative   Planned Therapy Interventions   Planned Therapy Interventions strengthening;balance training;neuromuscular re-education   Clinical Impression   Criteria for Skilled Therapeutic Interventions Met yes, treatment indicated   PT Diagnosis deconditioned, gait abnormality, oculomotor deficits   Functional limitations due to impairments walking, shopping, household tasks   Clinical Presentation Evolving/Changing   Clinical Presentation Rationale post surgery, balance, weakness, vision changes   Clinical Decision Making (Complexity) Moderate complexity   Therapy Frequency 1 time/week   Predicted Duration of Therapy Intervention (days/wks) 6wks, as sx improve, can do every other week   Risk & Benefits of therapy have been explained Yes   Patient, Family & other staff in agreement with plan of care Yes   Education Assessment   Preferred Learning Style Listening   Barriers to Learning No barriers   GOALS   PT Eval Goals 1;2   Goal 1   Goal Identifier 1   Goal Description pt will improve TUG to 12sec to demonstrate improved gait and decreased fall rsik   Target Date 02/16/22   Goal 2   Goal Identifier 2   Goal Description pt will be confident  to do short shopping trips in 6wk   Target Date 02/16/22   Total Evaluation Time   PT Eval, Moderate Complexity Minutes (23672) 25   Therapy Certification   Certification date from 01/05/22   Certification date to 02/16/22   Medical Diagnosis balance, weakness, post pituitary resection    [facility]  Kris Hoenk PT [facility]  [address] [place] [address]  [email or web address]   Office: [phone]  Voicemail: [phone]

## 2022-01-06 ENCOUNTER — OFFICE VISIT (OUTPATIENT)
Dept: FAMILY MEDICINE | Facility: CLINIC | Age: 67
End: 2022-01-06
Payer: COMMERCIAL

## 2022-01-06 VITALS
TEMPERATURE: 98.4 F | HEART RATE: 64 BPM | WEIGHT: 125.2 LBS | OXYGEN SATURATION: 96 % | BODY MASS INDEX: 21.37 KG/M2 | DIASTOLIC BLOOD PRESSURE: 53 MMHG | SYSTOLIC BLOOD PRESSURE: 136 MMHG | HEIGHT: 64 IN

## 2022-01-06 DIAGNOSIS — E87.1 HYPONATREMIA: ICD-10-CM

## 2022-01-06 DIAGNOSIS — E89.3 STATUS POST TRANSSPHENOIDAL PITUITARY RESECTION (H): ICD-10-CM

## 2022-01-06 DIAGNOSIS — R53.1 WEAKNESS: ICD-10-CM

## 2022-01-06 DIAGNOSIS — E86.0 DEHYDRATION: ICD-10-CM

## 2022-01-06 DIAGNOSIS — R55 SYNCOPE, UNSPECIFIED SYNCOPE TYPE: ICD-10-CM

## 2022-01-06 LAB
ANION GAP SERPL CALCULATED.3IONS-SCNC: 8 MMOL/L (ref 3–14)
BUN SERPL-MCNC: 13 MG/DL (ref 7–30)
CALCIUM SERPL-MCNC: 10.1 MG/DL (ref 8.5–10.1)
CHLORIDE BLD-SCNC: 100 MMOL/L (ref 94–109)
CO2 SERPL-SCNC: 26 MMOL/L (ref 20–32)
CREAT SERPL-MCNC: 0.74 MG/DL (ref 0.52–1.04)
GFR SERPL CREATININE-BSD FRML MDRD: 88 ML/MIN/1.73M2
GLUCOSE BLD-MCNC: 95 MG/DL (ref 70–99)
POTASSIUM BLD-SCNC: 4.8 MMOL/L (ref 3.4–5.3)
SODIUM SERPL-SCNC: 134 MMOL/L (ref 133–144)

## 2022-01-06 PROCEDURE — 80048 BASIC METABOLIC PNL TOTAL CA: CPT | Performed by: FAMILY MEDICINE

## 2022-01-06 PROCEDURE — 36415 COLL VENOUS BLD VENIPUNCTURE: CPT | Performed by: FAMILY MEDICINE

## 2022-01-06 PROCEDURE — 99213 OFFICE O/P EST LOW 20 MIN: CPT | Performed by: FAMILY MEDICINE

## 2022-01-06 ASSESSMENT — MIFFLIN-ST. JEOR: SCORE: 1087.9

## 2022-01-06 NOTE — PROGRESS NOTES
Assessment & Plan     Status post transsphenoidal pituitary resection (H)  Stable   Has appointment with Dr Pickens next week    Hyponatremia  better  - Basic metabolic panel  (Ca, Cl, CO2, Creat, Gluc, K, Na, BUN); Future  - Basic metabolic panel  (Ca, Cl, CO2, Creat, Gluc, K, Na, BUN)    Weakness  Improved     Dehydration  better    Syncope, unspecified syncope type  No further episodes      Return in about 1 month (around 2/6/2022) for recheck/ sooner if worse or New symptoms.    Isis Hernández MD  [facility] JAMES Otriz is a 66 year old who presents for the following health issues  accompanied by her spouse.  66 year old female with history of suprasellar mass, trigeminal neuralgia, and retinal artery occlusion   Pt had a resection of Pituitary mass  HPI       Hospital Follow-up Visit:    Hospital/Nursing Home/IP Rehab Facility: [facility]  Date of Admission: 01/04/2022-ER visit and also admitted 12-29-21 to 1-1-22  Date of Discharge:   Reason(s) for Admission: Weakness      CM       1. Weakness  R53.1     2. Hyponatremia  E87.1     3. Dehydration  E86.0      she is doing better since she stopped her Gabapentin , esgic and pain meds  She is doing well with Tylenol  More alert and feels better  ER and Hospital Notes reviewed   No further syncope    Was your hospitalization related to COVID-19? No   Problems taking medications regularly:  None  Medication changes since discharge: None  Problems adhering to non-medication therapy:  None    Summary of hospitalization:  [facility] discharge summary reviewed  Diagnostic Tests/Treatments reviewed.  Follow up needed: none  Other Healthcare Providers Involved in Patient s Care:         None  Update since discharge: improved. Post Discharge Medication Reconciliation: discharge medications reconciled and changed, per note/orders.  Plan of care communicated with patient            Review of Systems  "  CONSTITUTIONAL: NEGATIVE for fever, chills, change in weight  EYES: diplopia is better-In therapy  ENT/MOUTH: NEGATIVE for ear, mouth and throat problems  RESP: NEGATIVE for significant cough or SOB  CV: NEGATIVE for chest pain, palpitations or peripheral edema  GI: NEGATIVE for nausea, abdominal pain, heartburn, or change in bowel habits  NEURO: NEGATIVE for weakness, dizziness or paresthesias  PSYCHIATRIC: NEGATIVE for changes in mood or affect      Objective    /53   Pulse 64   Temp 98.4  F (36.9  C) (Oral)   Ht 1.626 m (5' 4\")   Wt 56.8 kg (125 lb 3.2 oz)   SpO2 96%   BMI 21.49 kg/m    Body mass index is 21.49 kg/m .  Physical Exam   GENERAL: healthy, alert and no distress  NECK: no adenopathy, no asymmetry, masses, or scars and thyroid normal to palpation  RESP: lungs clear to auscultation - no rales, rhonchi or wheezes  CV: regular rate and rhythm, normal S1 S2, no S3 or S4, no murmur, click or rub, no peripheral edema and peripheral pulses strong  ABDOMEN: soft, nontender, no hepatosplenomegaly, no masses and bowel sounds normal  MS: no gross musculoskeletal defects noted, no edema  NEURO: Normal strength and tone, mentation intact and speech normal  DTR are symmetrical  Pupils Reactive to Light  Diplopia is Improvong  PSYCH: mentation appears normal, affect normal/bright    Admission on 01/04/2022, Discharged on 01/04/2022   Component Date Value Ref Range Status     SARS CoV2 PCR 01/04/2022 Negative  Negative Final    NEGATIVE: SARS-CoV-2 (COVID-19) RNA not detected, presumed negative.     Sodium 01/04/2022 133  133 - 144 mmol/L Final     Potassium 01/04/2022 4.7  3.4 - 5.3 mmol/L Final    Specimen slightly hemolyzed, potassium may be falsely elevated.     Chloride 01/04/2022 101  94 - 109 mmol/L Final     Carbon Dioxide (CO2) 01/04/2022 28  20 - 32 mmol/L Final     Anion Gap 01/04/2022 4  3 - 14 mmol/L Final     Urea Nitrogen 01/04/2022 17  7 - 30 mg/dL Final     Creatinine 01/04/2022 0.60  " 0.52 - 1.04 mg/dL Final     Calcium 01/04/2022 9.5  8.5 - 10.1 mg/dL Final     Glucose 01/04/2022 84  70 - 99 mg/dL Final     Alkaline Phosphatase 01/04/2022 112  40 - 150 U/L Final     AST 01/04/2022 43  0 - 45 U/L Final    Specimen is hemolyzed which can falsely elevate AST. Analysis of a non-hemolyzed specimen may result in a lower value.     ALT 01/04/2022 34  0 - 50 U/L Final     Protein Total 01/04/2022 7.2  6.8 - 8.8 g/dL Final     Albumin 01/04/2022 3.4  3.4 - 5.0 g/dL Final     Bilirubin Total 01/04/2022 0.2  0.2 - 1.3 mg/dL Final     GFR Estimate 01/04/2022 >90  >60 mL/min/1.73m2 Final    Effective December 21, 2021 eGFRcr in adults is calculated using the 2021 CKD-EPI creatinine equation which includes age and gender (Mabel et al., NE, DOI: 10.1056/UZZCis5928567)     WBC Count 01/04/2022 6.4  4.0 - 11.0 10e3/uL Final     RBC Count 01/04/2022 3.30* 3.80 - 5.20 10e6/uL Final     Hemoglobin 01/04/2022 10.2* 11.7 - 15.7 g/dL Final     Hematocrit 01/04/2022 30.7* 35.0 - 47.0 % Final     MCV 01/04/2022 93  78 - 100 fL Final     MCH 01/04/2022 30.9  26.5 - 33.0 pg Final     MCHC 01/04/2022 33.2  31.5 - 36.5 g/dL Final     RDW 01/04/2022 13.8  10.0 - 15.0 % Final     Platelet Count 01/04/2022 365  150 - 450 10e3/uL Final     % Neutrophils 01/04/2022 69  % Final     % Lymphocytes 01/04/2022 19  % Final     % Monocytes 01/04/2022 6  % Final     % Eosinophils 01/04/2022 5  % Final     % Basophils 01/04/2022 1  % Final     % Immature Granulocytes 01/04/2022 0  % Final     NRBCs per 100 WBC 01/04/2022 0  <1 /100 Final     Absolute Neutrophils 01/04/2022 4.5  1.6 - 8.3 10e3/uL Final     Absolute Lymphocytes 01/04/2022 1.2  0.8 - 5.3 10e3/uL Final     Absolute Monocytes 01/04/2022 0.4  0.0 - 1.3 10e3/uL Final     Absolute Eosinophils 01/04/2022 0.3  0.0 - 0.7 10e3/uL Final     Absolute Basophils 01/04/2022 0.0  0.0 - 0.2 10e3/uL Final     Absolute Immature Granulocytes 01/04/2022 0.0  <=0.4 10e3/uL Final     Absolute  NRBCs 01/04/2022 0.0  10e3/uL Final     Hold Specimen 01/04/2022 [facility]   Final     Hold Specimen 01/04/2022 [facility]   Final     Hold Specimen 01/04/2022 [facility]   Final

## 2022-01-06 NOTE — LETTER
January 11, 2022    Diana Thompson  [address]   [place] [address]          Dear ,    We are writing to inform you of your test results.    Sodium is normal   Other electrolytes normal       Resulted Orders   Basic metabolic panel  (Ca, Cl, CO2, Creat, Gluc, K, Na, BUN)   Result Value Ref Range    Sodium 134 133 - 144 mmol/L    Potassium 4.8 3.4 - 5.3 mmol/L    Chloride 100 94 - 109 mmol/L    Carbon Dioxide (CO2) 26 20 - 32 mmol/L    Anion Gap 8 3 - 14 mmol/L    Urea Nitrogen 13 7 - 30 mg/dL    Creatinine 0.74 0.52 - 1.04 mg/dL    Calcium 10.1 8.5 - 10.1 mg/dL    Glucose 95 70 - 99 mg/dL    GFR Estimate 88 >60 mL/min/1.73m2      Comment:      Effective December 21, 2021 eGFRcr in adults is calculated using the 2021 CKD-EPI creatinine equation which includes age and gender (Mabel et al., NEJM, DOI: 10.1056/ICLPhc8719896)       If you have any questions or concerns, please call the clinic at the number listed above.       Sincerely,      Isis Hernández MD

## 2022-01-10 ENCOUNTER — OFFICE VISIT (OUTPATIENT)
Dept: NEUROSURGERY | Facility: CLINIC | Age: 67
End: 2022-01-10
Attending: NEUROLOGICAL SURGERY
Payer: COMMERCIAL

## 2022-01-10 VITALS
WEIGHT: 125 LBS | HEART RATE: 70 BPM | OXYGEN SATURATION: 100 % | BODY MASS INDEX: 21.34 KG/M2 | SYSTOLIC BLOOD PRESSURE: 143 MMHG | HEIGHT: 64 IN | DIASTOLIC BLOOD PRESSURE: 79 MMHG

## 2022-01-10 DIAGNOSIS — G93.89 SUPRASELLAR MASS: Primary | ICD-10-CM

## 2022-01-10 PROCEDURE — 99024 POSTOP FOLLOW-UP VISIT: CPT | Performed by: NEUROLOGICAL SURGERY

## 2022-01-10 ASSESSMENT — PAIN SCALES - GENERAL: PAINLEVEL: NO PAIN (0)

## 2022-01-10 ASSESSMENT — MIFFLIN-ST. JEOR: SCORE: 1087

## 2022-01-10 NOTE — PROGRESS NOTES
2 weeks post-op from endoscopic endonasal resection of pituitary adenoma.  Was admitted with generalized weakness and headaches, treated for hyponatremia.  Post-op MRIs have been stable.  Feeling much better now, low grade frontal headaches.  Follow up with Dr. Davis scheduled next week.  Will order a follow up Sodium level for next week.   Trial of amitriptyline qhs to help with insomnia and nerve pain.

## 2022-01-10 NOTE — PATIENT INSTRUCTIONS
Patient Next Steps:      Dr Pickens would like you to have a sodium level drawn in one week.      Please call us if you have any further questions or concerns.    [facility]   Phone: [phone]  Fax: [phone]          ]

## 2022-01-10 NOTE — LETTER
1/10/2022         RE: Diana Thompson  [address]  [place] [address]        Dear Colleague,    Thank you for referring your patient, Diana Thompson, to the [facility] [place]. Please see a copy of my visit note below.    2 weeks post-op from endoscopic endonasal resection of pituitary adenoma.  Was admitted with generalized weakness and headaches, treated for hyponatremia.  Post-op MRIs have been stable.  Feeling much better now, low grade frontal headaches.  Follow up with Dr. Davis scheduled next week.  Will order a follow up Sodium level for next week.      Again, thank you for allowing me to participate in the care of your patient.        Sincerely,        Roberto Pickens MD

## 2022-01-12 ENCOUNTER — HOSPITAL ENCOUNTER (OUTPATIENT)
Dept: PHYSICAL THERAPY | Facility: CLINIC | Age: 67
Setting detail: THERAPIES SERIES
End: 2022-01-12
Attending: NEUROLOGICAL SURGERY
Payer: COMMERCIAL

## 2022-01-12 PROCEDURE — 97112 NEUROMUSCULAR REEDUCATION: CPT | Mod: GP | Performed by: PHYSICAL THERAPIST

## 2022-01-12 PROCEDURE — 97110 THERAPEUTIC EXERCISES: CPT | Mod: GP | Performed by: PHYSICAL THERAPIST

## 2022-01-18 ENCOUNTER — LAB (OUTPATIENT)
Dept: LAB | Facility: CLINIC | Age: 67
End: 2022-01-18
Payer: COMMERCIAL

## 2022-01-18 DIAGNOSIS — G93.89 SUPRASELLAR MASS: ICD-10-CM

## 2022-01-18 LAB — SODIUM SERPL-SCNC: 139 MMOL/L (ref 133–144)

## 2022-01-18 PROCEDURE — 84295 ASSAY OF SERUM SODIUM: CPT

## 2022-01-18 PROCEDURE — 36415 COLL VENOUS BLD VENIPUNCTURE: CPT

## 2022-01-19 ENCOUNTER — TELEPHONE (OUTPATIENT)
Dept: NEUROSURGERY | Facility: CLINIC | Age: 67
End: 2022-01-19
Payer: COMMERCIAL

## 2022-01-25 ENCOUNTER — HOSPITAL ENCOUNTER (OUTPATIENT)
Dept: PHYSICAL THERAPY | Facility: CLINIC | Age: 67
Setting detail: THERAPIES SERIES
End: 2022-01-25
Attending: NEUROLOGICAL SURGERY
Payer: COMMERCIAL

## 2022-01-25 PROCEDURE — 97112 NEUROMUSCULAR REEDUCATION: CPT | Mod: GP | Performed by: PHYSICAL THERAPIST

## 2022-01-25 PROCEDURE — 97110 THERAPEUTIC EXERCISES: CPT | Mod: GP | Performed by: PHYSICAL THERAPIST

## 2022-01-25 NOTE — PROGRESS NOTES
"  Diana Thompson   PHYSICAL THERAPY PROGRESS NOTE/DISCHARGE 3/24/22  01/25/22 0900   Signing Clinician's Name / Credentials   Signing clinician's name / credentials Kris Hoenk, PT   Session Number   Session Number 3    Progress Note/Recertification   Recertification Due Date 02/16/22   Adult Goals   PT Eval Goals 1;2;3   Goal 1   Goal Identifier 1   Goal Description pt will improve TUG to 12sec to demonstrate improved gait and decreased fall rsik   Goal Progress 6.7 sec   Target Date 02/16/22   Date Met 01/25/22   Goal 2   Goal Identifier 2 to try in next 2 wks   Goal Description pt will be confident to do short shopping trips in 6wk   Goal Progress still has not tried yet   Target Date 02/16/22   Goal 3   Goal Identifier 3  new 1/25   Goal Description pt will be confident in driving short distance 2-4 miles in 2wk   Target Date 02/08/22   Subjective Report   Subjective Report doing well, doing everything around the house, hasn't been shopping, walking inside [facility], maybe 10min before resting, has not driven, vision is better, peripheral is little blurry   Objective Measures   Objective Measures Objective Measure 1  See rx details for balance, single leg stance 25 sec  4cm L eye does converge better now   Treatment Interventions   Interventions Therapeutic Procedure/Exercise;Neuromuscular Re-education   Therapeutic Procedure/exercise   Therapeutic Procedures: strength, endurance, ROM, flexibillity minutes (72613) 15   Skilled Intervention strength ex for HEP   Patient Response most  winded with step ups today   Treatment Detail recumb bike 5min seat 8, L3.8,  6\" fwd step up x20B   Neuromuscular Re-education   Neuromuscular re-ed of mvmt, balance, coord, kinesthetic sense, posture, proprioception minutes (35530) 15   Skilled Intervention balance drills   Patient Response see details   Treatment Detail SLS 3 tries each foot 25-30sec, monster walks x10R/L, tandem online 15ft no errors, walking fast on line not " heel-toe 20ft, 2 side steps to touch cone on floor x7B, figure 8 around 2 cones x4 times   Plan   Home program ex lsited   Plan for next session see in 2wks likely final recheck, excellent progress, to get out mall walking 2-3x /wk  Pt did not return for final visit, this will serve as discharge summary, Pt phoned and stated she was doing well, visit not needed   Comments   Comments MD 1/31   Total Session Time   Timed Code Treatment Minutes 30   Total Treatment Time (sum of timed and untimed services) 30   AMBULATORY CLINICS ONLY-MEDICAL AND TREATMENT DIAGNOSIS   Medical Diagnosis post pituitary resection, balance, weakness   PT Diagnosis deconditioned, gait abnormality, oculomotor deficits   [facility]  Kris Hoenk  PT  [facility]  [address] [place] [address]  [email or web address]   Office: [phone]  Voicemail: [phone]

## 2022-02-07 ENCOUNTER — OFFICE VISIT (OUTPATIENT)
Dept: NEUROSURGERY | Facility: CLINIC | Age: 67
End: 2022-02-07
Payer: COMMERCIAL

## 2022-02-07 VITALS
HEART RATE: 68 BPM | BODY MASS INDEX: 21.17 KG/M2 | SYSTOLIC BLOOD PRESSURE: 154 MMHG | OXYGEN SATURATION: 98 % | DIASTOLIC BLOOD PRESSURE: 72 MMHG | HEIGHT: 64 IN | WEIGHT: 124 LBS

## 2022-02-07 DIAGNOSIS — G93.89 SUPRASELLAR MASS: Primary | ICD-10-CM

## 2022-02-07 PROCEDURE — 99024 POSTOP FOLLOW-UP VISIT: CPT | Performed by: NEUROLOGICAL SURGERY

## 2022-02-07 ASSESSMENT — PAIN SCALES - GENERAL: PAINLEVEL: NO PAIN (0)

## 2022-02-07 ASSESSMENT — MIFFLIN-ST. JEOR: SCORE: 1082.46

## 2022-02-07 NOTE — PATIENT INSTRUCTIONS
Patient Next Steps:        o Order placed for Brain MRI to be done in 3 months. You can schedule at our  today, or central scheduling will call you to make the appointment. If you do not hear from them within 1-2 business days you can call the numbers below.   o [phone] ([facility])          Dr Pickens would like to see you back in the clinic for follow up in 3 months.       Please call us if you have any further questions or concerns.    [facility]   Phone: [phone]  Fax: [phone]

## 2022-02-07 NOTE — NURSING NOTE
"Diana Thompson is a 66 year old female who presents for:  Chief Complaint   Patient presents with     Neurologic Problem     6 week f/u        Initial Vitals:  BP (!) 154/72   Pulse 68   Ht 5' 4\" (1.626 m)   Wt 124 lb (56.2 kg)   SpO2 98%   BMI 21.28 kg/m   Estimated body mass index is 21.28 kg/m  as calculated from the following:    Height as of this encounter: 5' 4\" (1.626 m).    Weight as of this encounter: 124 lb (56.2 kg).. Body surface area is 1.59 meters squared. BP completed using cuff size: small regular  No Pain (0)    Nursing Comments: hypertension      Sean Elizabeth    "

## 2022-02-07 NOTE — LETTER
2/7/2022         RE: Diana Thompson  [address]  [place] [address]        Dear Colleague,    Thank you for referring your patient, Diana Thompson, to the [facility]. Please see a copy of my visit note below.    Doing well 6 weeks post-op.  Follow up Sodium was normal.  Vision has improved.  Still has some loss of smell/taste.    Past Medical History:   Diagnosis Date     Heart murmur 1981    normal echo      Retinal artery occlusion     Transient retinal artery occlusion of the left eye     Sleep related leg cramps      Suprasellar mass      Past Surgical History:   Procedure Laterality Date     OPTICAL TRACKING SYSTEM ENDOSCOPIC ENDONASAL SURGERY N/A 12/21/2021    Procedure: Endoscopic endonasal resection of suprasellar mass;  Surgeon: Roberto Pickens MD;  Location: [facility] OR     TUBAL LIGATION  1982     Social History     Socioeconomic History     Marital status:      Spouse name: Jeb      Number of children: 2     Years of education: 14     Highest education level: Not on file   Occupational History     Occupation:       Employer: [employer]   Tobacco Use     Smoking status: Never Smoker     Smokeless tobacco: Never Used   Vaping Use     Vaping Use: Never used   Substance and Sexual Activity     Alcohol use: Yes     Comment: occasional      Drug use: No     Sexual activity: Yes     Partners: Male     Birth control/protection: Post-menopausal   Other Topics Concern     Parent/sibling w/ CABG, MI or angioplasty before 65F 55M? Not Asked   Social History Narrative     Not on file     Social Determinants of Health     Financial Resource Strain: Not on file   Food Insecurity: Not on file   Transportation Needs: Not on file   Physical Activity: Not on file   Stress: Not on file   Social Connections: Not on file   Intimate Partner Violence: Not on file   Housing Stability: Not on file     Family History   Problem Relation Age of Onset      "Diabetes Mother      Heart Disease Mother         CHF     Respiratory Father         COPD     Cancer No family hx of      Colon Cancer No family hx of      Breast Cancer No family hx of         ROS: 10 point ROS neg other than the symptoms noted above in the HPI.    Physical Exam  BP (!) 154/72   Pulse 68   Ht 1.626 m (5' 4\")   Wt 56.2 kg (124 lb)   SpO2 98%   BMI 21.28 kg/m    HEENT:  Normocephalic, atraumatic.  PERRLA.  EOM s intact.  Visual fields full to gross exam  Neck:  Supple, non-tender, without lymphadenopathy.  Heart:  No peripheral edema  Lungs:  No SOB  Abdomen:  Non-distended.   Skin:  Warm and dry.  Extremities:  No edema, cyanosis or clubbing.  Psychiatric:  No apparent distress  Musculoskeletal:  Normal bulk and tone    NEUROLOGICAL EXAMINATION:     Mental status:  Alert and Oriented x 3, speech is fluent.  Cranial nerves:  II-XII intact.   Motor:    Shoulder Abduction:  Right:  5/5   Left:  5/5  Biceps:                      Right:  5/5   Left:  5/5  Triceps:                     Right:  5/5   Left:  5/5  Wrist Extensors:       Right:  5/5   Left:  5/5  Wrist Flexors:           Right:  5/5   Left:  5/5  interosseus :            Right:  5/5   Left:  5/5  Hip Flexor:                Right: 5/5  Left:  5/5  Quadriceps:             Right:  5/5  Left:  5/5  Hamstrings:             Right:  5/5  Left:  5/5  Gastroc Soleus:        Right:  5/5  Left:  5/5  Tib/Ant:                      Right:  5/5  Left:  5/5  EHL:                     Right:  5/5  Left:  5/5  Sensation:  Intact  Reflexes:  Negative Babinski.  Negative Clonus.  Negative Jerome's.  Coordination:  Smooth finger to nose testing.   Negative pronator drift.  Smooth tandem walking.  Incision well healed    A/P:  She is seeing Dr. Davis for follow up this week  MR at 3 mos post-op        Again, thank you for allowing me to participate in the care of your patient.        Sincerely,        Roberto Pickens MD    "

## 2022-02-07 NOTE — PROGRESS NOTES
Doing well 6 weeks post-op.  Follow up Sodium was normal.  Vision has improved.  Still has some loss of smell/taste.    Past Medical History:   Diagnosis Date     Heart murmur 1981    normal echo      Retinal artery occlusion     Transient retinal artery occlusion of the left eye     Sleep related leg cramps      Suprasellar mass      Past Surgical History:   Procedure Laterality Date     OPTICAL TRACKING SYSTEM ENDOSCOPIC ENDONASAL SURGERY N/A 12/21/2021    Procedure: Endoscopic endonasal resection of suprasellar mass;  Surgeon: Roberto Pickens MD;  Location: [facility] OR     TUBAL LIGATION  1982     Social History     Socioeconomic History     Marital status:      Spouse name: Jeb      Number of children: 2     Years of education: 14     Highest education level: Not on file   Occupational History     Occupation:       Employer: [employer]   Tobacco Use     Smoking status: Never Smoker     Smokeless tobacco: Never Used   Vaping Use     Vaping Use: Never used   Substance and Sexual Activity     Alcohol use: Yes     Comment: occasional      Drug use: No     Sexual activity: Yes     Partners: Male     Birth control/protection: Post-menopausal   Other Topics Concern     Parent/sibling w/ CABG, MI or angioplasty before 65F 55M? Not Asked   Social History Narrative     Not on file     Social Determinants of Health     Financial Resource Strain: Not on file   Food Insecurity: Not on file   Transportation Needs: Not on file   Physical Activity: Not on file   Stress: Not on file   Social Connections: Not on file   Intimate Partner Violence: Not on file   Housing Stability: Not on file     Family History   Problem Relation Age of Onset     Diabetes Mother      Heart Disease Mother         CHF     Respiratory Father         COPD     Cancer No family hx of      Colon Cancer No family hx of      Breast Cancer No family hx of         ROS: 10 point ROS neg other than the symptoms noted above in the  "HPI.    Physical Exam  BP (!) 154/72   Pulse 68   Ht 1.626 m (5' 4\")   Wt 56.2 kg (124 lb)   SpO2 98%   BMI 21.28 kg/m    HEENT:  Normocephalic, atraumatic.  PERRLA.  EOM s intact.  Visual fields full to gross exam  Neck:  Supple, non-tender, without lymphadenopathy.  Heart:  No peripheral edema  Lungs:  No SOB  Abdomen:  Non-distended.   Skin:  Warm and dry.  Extremities:  No edema, cyanosis or clubbing.  Psychiatric:  No apparent distress  Musculoskeletal:  Normal bulk and tone    NEUROLOGICAL EXAMINATION:     Mental status:  Alert and Oriented x 3, speech is fluent.  Cranial nerves:  II-XII intact.   Motor:    Shoulder Abduction:  Right:  5/5   Left:  5/5  Biceps:                      Right:  5/5   Left:  5/5  Triceps:                     Right:  5/5   Left:  5/5  Wrist Extensors:       Right:  5/5   Left:  5/5  Wrist Flexors:           Right:  5/5   Left:  5/5  interosseus :            Right:  5/5   Left:  5/5  Hip Flexor:                Right: 5/5  Left:  5/5  Quadriceps:             Right:  5/5  Left:  5/5  Hamstrings:             Right:  5/5  Left:  5/5  Gastroc Soleus:        Right:  5/5  Left:  5/5  Tib/Ant:                      Right:  5/5  Left:  5/5  EHL:                     Right:  5/5  Left:  5/5  Sensation:  Intact  Reflexes:  Negative Babinski.  Negative Clonus.  Negative Jerome's.  Coordination:  Smooth finger to nose testing.   Negative pronator drift.  Smooth tandem walking.  Incision well healed    A/P:  She is seeing Dr. Davis for follow up this week  MR at 3 mos post-op    "

## 2022-02-28 ENCOUNTER — TELEPHONE (OUTPATIENT)
Dept: NEUROSURGERY | Facility: OTHER | Age: 67
End: 2022-02-28
Payer: COMMERCIAL

## 2022-02-28 NOTE — TELEPHONE ENCOUNTER
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 Had debridement with Dr Davis 2/21/22. Since then has had a HA located above right eye, crown and back of head. Rates pain as 5/10, controlled somewhat with Tylenol.   Also has had a very small amount of clear drainage from right nostril since then, only in mornings.   And over the weekend had 2 1-2 minute long episodes of 'clicking sounds' in her head that were audible to family.   No other symptoms.   Has call to Dr Davis's office as well. Awaiting response.

## 2022-02-28 NOTE — TELEPHONE ENCOUNTER
Dinaa called-in for [facility] about a clicking noise in her head and pain. Please reach her at [phone].

## 2022-05-04 ENCOUNTER — HOSPITAL ENCOUNTER (OUTPATIENT)
Dept: MRI IMAGING | Facility: CLINIC | Age: 67
Discharge: HOME | End: 2022-05-04
Attending: NEUROLOGICAL SURGERY | Admitting: NEUROLOGICAL SURGERY
Payer: COMMERCIAL

## 2022-05-04 DIAGNOSIS — G93.89 SUPRASELLAR MASS: ICD-10-CM

## 2022-05-04 PROCEDURE — 70543 MRI ORBT/FAC/NCK W/O &W/DYE: CPT

## 2022-05-04 PROCEDURE — A9585 GADOBUTROL INJECTION: HCPCS | Performed by: NEUROLOGICAL SURGERY

## 2022-05-04 PROCEDURE — 255N000002 HC RX 255 OP 636: Performed by: NEUROLOGICAL SURGERY

## 2022-05-04 RX ORDER — GADOBUTROL 604.72 MG/ML
6 INJECTION INTRAVENOUS ONCE
Status: COMPLETED | OUTPATIENT
Start: 2022-05-04 | End: 2022-05-04

## 2022-05-04 RX ADMIN — GADOBUTROL 6 ML: 604.72 INJECTION INTRAVENOUS at 10:25

## 2022-05-05 ENCOUNTER — OFFICE VISIT (OUTPATIENT)
Dept: NEUROSURGERY | Facility: CLINIC | Age: 67
End: 2022-05-05
Payer: COMMERCIAL

## 2022-05-05 VITALS
HEART RATE: 72 BPM | SYSTOLIC BLOOD PRESSURE: 126 MMHG | WEIGHT: 121.4 LBS | DIASTOLIC BLOOD PRESSURE: 72 MMHG | OXYGEN SATURATION: 97 % | BODY MASS INDEX: 20.84 KG/M2

## 2022-05-05 DIAGNOSIS — E89.3 S/P TRANSSPHENOIDAL HYPOPHYSECTOMY (H): Primary | ICD-10-CM

## 2022-05-05 PROCEDURE — 99213 OFFICE O/P EST LOW 20 MIN: CPT | Performed by: NEUROLOGICAL SURGERY

## 2022-05-05 ASSESSMENT — PAIN SCALES - GENERAL: PAINLEVEL: NO PAIN (0)

## 2022-05-05 NOTE — NURSING NOTE
"Diana Thompson is a 66 year old female who presents for:  Chief Complaint   Patient presents with     Results     MRI result        Initial Vitals:  /72   Pulse 72   Wt 121 lb 6.4 oz (55.1 kg)   SpO2 97%   BMI 20.84 kg/m   Estimated body mass index is 20.84 kg/m  as calculated from the following:    Height as of 2/7/22: 5' 4\" (1.626 m).    Weight as of this encounter: 121 lb 6.4 oz (55.1 kg).. Body surface area is 1.58 meters squared. BP completed using cuff size: regular  No Pain (0)    Nursing Comments: 0/10 at the moment. Did experience a slight headache this morning.    Chris Waldron MA    "

## 2022-05-05 NOTE — PROGRESS NOTES
Doing well 5 mos post-op.  Some headaches in the morning and sense of smell/taste has been slow to return.  New MR shows interval collapse of capsule with no mass effect on the chiasm.  Vision has returned to normal.    Past Medical History:   Diagnosis Date     Heart murmur 1981    normal echo      Retinal artery occlusion     Transient retinal artery occlusion of the left eye     Sleep related leg cramps      Suprasellar mass      Past Surgical History:   Procedure Laterality Date     OPTICAL TRACKING SYSTEM ENDOSCOPIC ENDONASAL SURGERY N/A 12/21/2021    Procedure: Endoscopic endonasal resection of suprasellar mass;  Surgeon: Roberto Pickens MD;  Location: [facility] OR     TUBAL LIGATION  1982     Social History     Socioeconomic History     Marital status:      Spouse name: Jeb      Number of children: 2     Years of education: 14     Highest education level: Not on file   Occupational History     Occupation:       Employer: [employer]   Tobacco Use     Smoking status: Never Smoker     Smokeless tobacco: Never Used   Vaping Use     Vaping Use: Never used   Substance and Sexual Activity     Alcohol use: Yes     Comment: occasional      Drug use: No     Sexual activity: Yes     Partners: Male     Birth control/protection: Post-menopausal   Other Topics Concern     Parent/sibling w/ CABG, MI or angioplasty before 65F 55M? Not Asked   Social History Narrative     Not on file     Social Determinants of Health     Financial Resource Strain: Not on file   Food Insecurity: Not on file   Transportation Needs: Not on file   Physical Activity: Not on file   Stress: Not on file   Social Connections: Not on file   Intimate Partner Violence: Not on file   Housing Stability: Not on file     Family History   Problem Relation Age of Onset     Diabetes Mother      Heart Disease Mother         CHF     Respiratory Father         COPD     Cancer No family hx of      Colon Cancer No family hx of      Breast  Cancer No family hx of         ROS: 10 point ROS neg other than the symptoms noted above in the HPI.    Physical Exam  /72   Pulse 72   Wt 55.1 kg (121 lb 6.4 oz)   SpO2 97%   BMI 20.84 kg/m    HEENT:  Normocephalic, atraumatic.  PERRLA.  EOM s intact.  Visual fields full to gross exam  Neck:  Supple, non-tender, without lymphadenopathy.  Heart:  No peripheral edema  Lungs:  No SOB  Abdomen:  Non-distended.   Skin:  Warm and dry.  Extremities:  No edema, cyanosis or clubbing.  Psychiatric:  No apparent distress  Musculoskeletal:  Normal bulk and tone    NEUROLOGICAL EXAMINATION:     Mental status:  Alert and Oriented x 3, speech is fluent.  Cranial nerves:  II-XII intact.   Motor:    Shoulder Abduction:  Right:  5/5   Left:  5/5  Biceps:                      Right:  5/5   Left:  5/5  Triceps:                     Right:  5/5   Left:  5/5  Wrist Extensors:       Right:  5/5   Left:  5/5  Wrist Flexors:           Right:  5/5   Left:  5/5  interosseus :            Right:  5/5   Left:  5/5  Hip Flexor:                Right: 5/5  Left:  5/5  Quadriceps:             Right:  5/5  Left:  5/5  Hamstrings:             Right:  5/5  Left:  5/5  Gastroc Soleus:        Right:  5/5  Left:  5/5  Tib/Ant:                      Right:  5/5  Left:  5/5  EHL:                     Right:  5/5  Left:  5/5  Sensation:  Intact  Reflexes:  Negative Babinski.  Negative Clonus.  Negative Jerome's.  Coordination:  Smooth finger to nose testing.   Negative pronator drift.  Smooth tandem walking.  Incision well healed    A/P:  Repeat MR in 1 year

## 2022-05-05 NOTE — LETTER
5/5/2022         RE: Diana Thompson  [address]  [place] [address]        Dear Colleague,    Thank you for referring your patient, Diana Thompson, to the [facility]. Please see a copy of my visit note below.    Doing well 5 mos post-op.  Some headaches in the morning and sense of smell/taste has been slow to return.  New MR shows interval collapse of capsule with no mass effect on the chiasm.  Vision has returned to normal.    Past Medical History:   Diagnosis Date     Heart murmur 1981    normal echo      Retinal artery occlusion     Transient retinal artery occlusion of the left eye     Sleep related leg cramps      Suprasellar mass      Past Surgical History:   Procedure Laterality Date     OPTICAL TRACKING SYSTEM ENDOSCOPIC ENDONASAL SURGERY N/A 12/21/2021    Procedure: Endoscopic endonasal resection of suprasellar mass;  Surgeon: Roberto Pcikens MD;  Location: [facility] OR     TUBAL LIGATION  1982     Social History     Socioeconomic History     Marital status:      Spouse name: Jeb      Number of children: 2     Years of education: 14     Highest education level: Not on file   Occupational History     Occupation:       Employer: [employer]   Tobacco Use     Smoking status: Never Smoker     Smokeless tobacco: Never Used   Vaping Use     Vaping Use: Never used   Substance and Sexual Activity     Alcohol use: Yes     Comment: occasional      Drug use: No     Sexual activity: Yes     Partners: Male     Birth control/protection: Post-menopausal   Other Topics Concern     Parent/sibling w/ CABG, MI or angioplasty before 65F 55M? Not Asked   Social History Narrative     Not on file     Social Determinants of Health     Financial Resource Strain: Not on file   Food Insecurity: Not on file   Transportation Needs: Not on file   Physical Activity: Not on file   Stress: Not on file   Social Connections: Not on file   Intimate Partner Violence: Not  on file   Housing Stability: Not on file     Family History   Problem Relation Age of Onset     Diabetes Mother      Heart Disease Mother         CHF     Respiratory Father         COPD     Cancer No family hx of      Colon Cancer No family hx of      Breast Cancer No family hx of         ROS: 10 point ROS neg other than the symptoms noted above in the HPI.    Physical Exam  /72   Pulse 72   Wt 55.1 kg (121 lb 6.4 oz)   SpO2 97%   BMI 20.84 kg/m    HEENT:  Normocephalic, atraumatic.  PERRLA.  EOM s intact.  Visual fields full to gross exam  Neck:  Supple, non-tender, without lymphadenopathy.  Heart:  No peripheral edema  Lungs:  No SOB  Abdomen:  Non-distended.   Skin:  Warm and dry.  Extremities:  No edema, cyanosis or clubbing.  Psychiatric:  No apparent distress  Musculoskeletal:  Normal bulk and tone    NEUROLOGICAL EXAMINATION:     Mental status:  Alert and Oriented x 3, speech is fluent.  Cranial nerves:  II-XII intact.   Motor:    Shoulder Abduction:  Right:  5/5   Left:  5/5  Biceps:                      Right:  5/5   Left:  5/5  Triceps:                     Right:  5/5   Left:  5/5  Wrist Extensors:       Right:  5/5   Left:  5/5  Wrist Flexors:           Right:  5/5   Left:  5/5  interosseus :            Right:  5/5   Left:  5/5  Hip Flexor:                Right: 5/5  Left:  5/5  Quadriceps:             Right:  5/5  Left:  5/5  Hamstrings:             Right:  5/5  Left:  5/5  Gastroc Soleus:        Right:  5/5  Left:  5/5  Tib/Ant:                      Right:  5/5  Left:  5/5  EHL:                     Right:  5/5  Left:  5/5  Sensation:  Intact  Reflexes:  Negative Babinski.  Negative Clonus.  Negative Jerome's.  Coordination:  Smooth finger to nose testing.   Negative pronator drift.  Smooth tandem walking.  Incision well healed    A/P:  Repeat MR in 1 year        Again, thank you for allowing me to participate in the care of your patient.        Sincerely,        Roberto Pickens MD

## 2022-05-05 NOTE — PATIENT INSTRUCTIONS
Repeat MRI scan in 1 year. They will call to schedule. If the don't call within 2 days, call [facility]: [phone]    [facility]: [phone]     You should schedule this within one week. We will call you with the results. If you don't hear from us 7 days after the scan, please call us.    [facility] -[facility] [place]  [facility] - [address] [address]  Telephone:  [phone]       Fax:  [phone]

## 2022-11-15 ENCOUNTER — OFFICE VISIT (OUTPATIENT)
Dept: FAMILY MEDICINE | Facility: CLINIC | Age: 67
End: 2022-11-15
Payer: COMMERCIAL

## 2022-11-15 ENCOUNTER — NURSE TRIAGE (OUTPATIENT)
Dept: NURSING | Facility: CLINIC | Age: 67
End: 2022-11-15

## 2022-11-15 VITALS
BODY MASS INDEX: 21.51 KG/M2 | HEIGHT: 64 IN | TEMPERATURE: 98.2 F | OXYGEN SATURATION: 99 % | DIASTOLIC BLOOD PRESSURE: 64 MMHG | SYSTOLIC BLOOD PRESSURE: 120 MMHG | WEIGHT: 126 LBS | HEART RATE: 90 BPM

## 2022-11-15 DIAGNOSIS — R51.9 NONINTRACTABLE EPISODIC HEADACHE, UNSPECIFIED HEADACHE TYPE: Primary | ICD-10-CM

## 2022-11-15 PROCEDURE — 99213 OFFICE O/P EST LOW 20 MIN: CPT | Performed by: PHYSICIAN ASSISTANT

## 2022-11-15 ASSESSMENT — ENCOUNTER SYMPTOMS
FEVER: 0
RHINORRHEA: 1
LIGHT-HEADEDNESS: 0
NERVOUS/ANXIOUS: 0
PARESTHESIAS: 0
CHILLS: 0
COUGH: 0
WEAKNESS: 0
NUMBNESS: 0
HEADACHES: 1
SHORTNESS OF BREATH: 0

## 2022-11-15 ASSESSMENT — PAIN SCALES - GENERAL: PAINLEVEL: NO PAIN (0)

## 2022-11-15 NOTE — TELEPHONE ENCOUNTER
"Pt calls to report \"recurring headaches.\"  \"Waking up with pain above R eye x one week.\"  \"Sharp pain occurs primarily with movement.\"  Examples:  - getting out of bed  - bending downward to  an object  Pain is \"sharp\" when it occurs.  Rates \"7-to-8/10 when occurs.\"  \"Lasts about fifteen minutes, then resolves.\"  NO pain currently.    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  \"Little bit of pain in R cheek bone.\"  \"Same side as the past nasal debridement.\"  \"Also had episode vertigo two weeks ago.\"  No vertigo since then.    Pt agrees to clinical eval to determine next steps.  Transferred to a  for appointment.  Same-day OV appt found and scheduled successfully.    Melissa [facility] Nurse Advisor     Reason for Disposition    Unexplained headache that is present > 24 hours    Additional Information    Negative: Difficult to awaken or acting confused (e.g., disoriented, slurred speech)    Negative: Weakness of the face, arm or leg on one side of the body and new-onset    Negative: Numbness of the face, arm or leg on one side of the body and new-onset    Negative: Loss of speech or garbled speech and new-onset    Negative: Passed out (i.e., fainted, collapsed and was not responding)    Negative: Sounds like a life-threatening emergency to the triager    Negative: Followed a head injury within last 3 days    Negative: Traumatic Brain Injury (TBI) is suspected    Negative: Sinus pain of forehead and yellow or green nasal discharge    Negative: Pregnant    Negative: Unable to walk without falling    Negative: Stiff neck (can't touch chin to chest)    Negative: Possibility of carbon monoxide exposure    Negative: SEVERE headache, states 'worst headache' of life    Negative: SEVERE headache, " sudden-onset (i.e., reaching maximum intensity within seconds to 1 hour)    Negative: Severe pain in one eye    Negative: Loss of vision or double vision  (Exception: Same as prior migraines.)    Negative: Patient sounds very sick or weak to the triager    Negative: Fever > 103 F (39.4 C)    Negative: Fever > 100.0 F (37.8 C) and has diabetes mellitus or a weak immune system (e.g., HIV positive, cancer chemotherapy, organ transplant, splenectomy, chronic steroids)    Negative: SEVERE headache (e.g., excruciating) and has had severe headaches before    Negative: SEVERE headache and not relieved by pain meds    Negative: SEVERE headache and vomiting    Negative: SEVERE headache and fever    Negative: New headache and weak immune system (e.g., HIV positive, cancer chemo, splenectomy, organ transplant, chronic steroids)    Negative: Fever present > 3 days (72 hours)    Negative: Patient wants to be seen    Protocols used: HEADACHE-A-OH

## 2022-11-15 NOTE — PROGRESS NOTES
Assessment & Plan     Nonintractable episodic headache, unspecified headache type  Patient is a 59-year-old female with past medical history significant for resection of pituitary mass and chronic rhinitis who presents to clinic due to 1 week of sharp sudden headaches that occur superior to right eyebrow with bending over.  Patient is concerned that it could be related to prior pituitary mass.  Per chart review, patient has experienced similar symptoms in the past with subsequent intracranial imaging showing no change or new mass.  Vital signs normal.      Low suspicion for acute intracranial pathology as symptoms are specific to bending over/movement and no neurological deficits on exam.  I suspect symptoms may be related to chronic rhinitis and recommended continued use of nasal saline rinse.  Additionally, discussed follow-up with neurosurgery team versus ENT for further evaluation.  Patient indicates that she needs referral for neurosurgery team to see desired ENT, thus she will send message to neurosurgery for recommendations on next steps.  Discussed symptoms that warrant urgent/emergent follow-up and retractions.     See Patient Instructions    Return if symptoms worsen or fail to improve.    Annelise Omalley PA-C  [facility] PAGE Ortiz is a 67 year old, presenting for the following health issues:  Headache      Headache   Pertinent negatives include no fever and no shortness of breath.   History of Present Illness       Reason for visit:  Headache possibly from surgical procedure    She eats 2-3 servings of fruits and vegetables daily.She consumes 1 sweetened beverage(s) daily.She exercises with enough effort to increase her heart rate 9 or less minutes per day.  She exercises with enough effort to increase her heart rate 3 or less days per week.   She is taking medications regularly.     Patient notes that she sleeps well overnight but she has developed a sharp pain over the  right eyebrow that shoots towards right eye. This pain occurs with specific movements such as bending over. 2-3 weeks prior patient developed a spinning sensation that lasted an extended period of time. She notes calling Neurosurgeon's office-no notes in Epic.No vision changes, fever, weakness, numbness/tingling.     In chart, patient did call neurosurgery office 2/28 with similar pain above right eye and clicking that lasted 1-2 minutes. Patient evaluated by neurosurgery 5/5/22 with recommendation of follow-up MRI. Results:   IMPRESSION:  1. 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 There has been some interval collapse of the previously  demonstrated peripherally enhancing cystic postsurgical cavity in the  sella/suprasellar region compared to most recent exam. Small amount of  nonenhancing tissue along the floor of the sella with peripheral  enhancement noted. Low-lying appearance of the optic  chiasm/prechiasmatic optic nerves. No definite findings of interval  tumor progression/recurrence. Recommend continued follow-up.  2. No acute intracranial abnormality.    Patient evaluated by ENT for chronic rhinitis 3/21/22. Recommendation included continued nasal saline rinse.     Per RN triage note today, patient called to report recurring headaches where she wakes up with pain above right eye ongoing for 1 week.  Pain is sharp and primarily occurs with movement.  Rates pain 7-8/10 when occurring.  Pain lasts about 15 minutes and then resolves.  Patient concerned this is related to pituitary gland mass removed December 2021 or right-sided nasal debridement.  No similar pain with prior mass. Small amount of pain present in right cheekbone.    Review of Systems   Constitutional: Negative for chills and fever.   HENT: Positive for rhinorrhea. Negative for congestion and sneezing.    Eyes: Negative for visual disturbance.   Respiratory: Negative for cough and shortness of breath.   "  Cardiovascular: Negative for chest pain.   Skin: Negative for rash.   Neurological: Positive for headaches. Negative for weakness, light-headedness, numbness and paresthesias.   Psychiatric/Behavioral: The patient is not nervous/anxious.             Objective    /64 (BP Location: Left arm, Cuff Size: Adult Regular)   Pulse 90   Temp 98.2  F (36.8  C) (Tympanic)   Ht 1.626 m (5' 4\")   Wt 57.2 kg (126 lb)   SpO2 99%   BMI 21.63 kg/m    Body mass index is 21.63 kg/m .  Physical Exam  Vitals and nursing note reviewed.   Constitutional:       General: She is not in acute distress.     Appearance: Normal appearance.   HENT:      Head: Normocephalic and atraumatic.      Mouth/Throat:      Mouth: Mucous membranes are moist.      Pharynx: Oropharynx is clear.   Eyes:      Extraocular Movements: Extraocular movements intact.      Pupils: Pupils are equal, round, and reactive to light.   Cardiovascular:      Rate and Rhythm: Normal rate and regular rhythm.      Heart sounds: Normal heart sounds.   Pulmonary:      Effort: Pulmonary effort is normal.      Breath sounds: Normal breath sounds.   Musculoskeletal:         General: Normal range of motion.      Cervical back: Normal range of motion.   Skin:     General: Skin is warm and dry.   Neurological:      General: No focal deficit present.      Mental Status: She is alert.      Cranial Nerves: No cranial nerve deficit.      Coordination: Coordination normal.      Gait: Gait normal.   Psychiatric:         Mood and Affect: Mood normal.         Behavior: Behavior normal.                            "

## 2022-11-15 NOTE — PATIENT INSTRUCTIONS
Geremias Ortiz,     I suspect your headaches may be related to your sinuses or possibly related to your prior surgery.  For further evaluation, I would like you to reach out to your neurosurgeon, Dr. Pickens's office and ask about follow-up with him or if he recommends you see your ENT doctor first.  We do not order any imaging at this time as your ENT doctor or neurosurgeon may want specific images completed.  I would recommend continuing with the nasal saline rinse in case your symptoms are related to sinus inflammation.      As discussed, if you develop any severe symptoms such as fevers, sudden vision changes, Severe headaches that are not going away, fevers, or any other symptoms, please go to the emergency department.    Reach out with questions or concerns.  Take care,  Annelise Omalley PA-C

## 2022-11-16 ENCOUNTER — TELEPHONE (OUTPATIENT)
Dept: NEUROSURGERY | Facility: CLINIC | Age: 67
End: 2022-11-16

## 2022-11-16 DIAGNOSIS — E89.3 S/P TRANSSPHENOIDAL HYPOPHYSECTOMY (H): Primary | ICD-10-CM

## 2022-11-16 NOTE — TELEPHONE ENCOUNTER
Dr Pickens would like imaging before a follow up appt to discuss continued headaches .  Appt cancelled.   MRI ordered  Message sent to scheduling team to coordinate  Per patient, MRI should be done at [facility] location for best results

## 2022-12-01 ENCOUNTER — OFFICE VISIT (OUTPATIENT)
Dept: NEUROSURGERY | Facility: CLINIC | Age: 67
End: 2022-12-01
Payer: COMMERCIAL

## 2022-12-01 ENCOUNTER — ANCILLARY PROCEDURE (OUTPATIENT)
Dept: MRI IMAGING | Facility: CLINIC | Age: 67
End: 2022-12-01
Attending: NEUROLOGICAL SURGERY
Payer: COMMERCIAL

## 2022-12-01 VITALS — HEART RATE: 63 BPM | DIASTOLIC BLOOD PRESSURE: 69 MMHG | SYSTOLIC BLOOD PRESSURE: 138 MMHG | OXYGEN SATURATION: 100 %

## 2022-12-01 DIAGNOSIS — E89.3 S/P TRANSSPHENOIDAL HYPOPHYSECTOMY (H): Primary | ICD-10-CM

## 2022-12-01 DIAGNOSIS — E89.3 S/P TRANSSPHENOIDAL HYPOPHYSECTOMY (H): ICD-10-CM

## 2022-12-01 LAB
ANION GAP SERPL CALCULATED.3IONS-SCNC: 3 MMOL/L (ref 3–14)
BUN SERPL-MCNC: 15 MG/DL (ref 7–30)
CALCIUM SERPL-MCNC: 10.1 MG/DL (ref 8.5–10.1)
CHLORIDE BLD-SCNC: 107 MMOL/L (ref 94–109)
CO2 SERPL-SCNC: 29 MMOL/L (ref 20–32)
CREAT SERPL-MCNC: 0.74 MG/DL (ref 0.52–1.04)
GFR SERPL CREATININE-BSD FRML MDRD: 88 ML/MIN/1.73M2
GLUCOSE BLD-MCNC: 93 MG/DL (ref 70–99)
POTASSIUM BLD-SCNC: 4.7 MMOL/L (ref 3.4–5.3)
SODIUM SERPL-SCNC: 139 MMOL/L (ref 133–144)
T4 FREE SERPL-MCNC: 0.87 NG/DL (ref 0.76–1.46)
TSH SERPL DL<=0.005 MIU/L-ACNC: 1.74 MU/L (ref 0.4–4)

## 2022-12-01 PROCEDURE — A9585 GADOBUTROL INJECTION: HCPCS | Performed by: NEUROLOGICAL SURGERY

## 2022-12-01 PROCEDURE — 255N000002 HC RX 255 OP 636: Performed by: NEUROLOGICAL SURGERY

## 2022-12-01 PROCEDURE — 84443 ASSAY THYROID STIM HORMONE: CPT | Performed by: NEUROLOGICAL SURGERY

## 2022-12-01 PROCEDURE — 36415 COLL VENOUS BLD VENIPUNCTURE: CPT | Performed by: NEUROLOGICAL SURGERY

## 2022-12-01 PROCEDURE — 84439 ASSAY OF FREE THYROXINE: CPT | Performed by: NEUROLOGICAL SURGERY

## 2022-12-01 PROCEDURE — 80048 BASIC METABOLIC PNL TOTAL CA: CPT | Performed by: NEUROLOGICAL SURGERY

## 2022-12-01 PROCEDURE — 99213 OFFICE O/P EST LOW 20 MIN: CPT | Performed by: NEUROLOGICAL SURGERY

## 2022-12-01 PROCEDURE — 70553 MRI BRAIN STEM W/O & W/DYE: CPT

## 2022-12-01 RX ORDER — GADOBUTROL 604.72 MG/ML
5.5 INJECTION INTRAVENOUS ONCE
Status: COMPLETED | OUTPATIENT
Start: 2022-12-01 | End: 2022-12-01

## 2022-12-01 RX ADMIN — GADOBUTROL 5.5 ML: 604.72 INJECTION INTRAVENOUS at 09:05

## 2022-12-01 ASSESSMENT — PAIN SCALES - GENERAL: PAINLEVEL: NO PAIN (0)

## 2022-12-01 NOTE — NURSING NOTE
"Diana Thompson is a 67 year old female who presents for:  Chief Complaint   Patient presents with     RECHECK     Recurring headaches, sharp pain when bending over - S/P Endoscopic         Initial Vitals:  /69   Pulse 63   SpO2 100%  Estimated body mass index is 21.63 kg/m  as calculated from the following:    Height as of 11/15/22: 5' 4\" (1.626 m).    Weight as of 11/15/22: 126 lb (57.2 kg).. There is no height or weight on file to calculate BSA. BP completed using cuff size: large  No Pain (0)    Nursing Comments:     Yuriy Newamn MA    "

## 2022-12-01 NOTE — LETTER
12/1/2022         RE: Diana Thompson  [address]  [place] [address]        Dear Colleague,    Thank you for referring your patient, Diana Thompson, to the [facility]. Please see a copy of my visit note below.    Doing well 1 year post-op.  Recently, has noted worsening headaches, worse when bending over.  She also felt very weak globally a couple weeks ago.  She still has not totally recovered her sense of smell and feels much less appetite.  They are worried that her Sodium level might be low, and think her energy was better after drinking pedialyte.  MR Brain, personally reviewed, shows no evidence of residual, but marked right maxillary sinusitis and sinus inflammation in the ethmoids and left frontal sinus.     Past Medical History:   Diagnosis Date     Heart murmur 1981    normal echo      Retinal artery occlusion     Transient retinal artery occlusion of the left eye     Sleep related leg cramps      Suprasellar mass      Past Surgical History:   Procedure Laterality Date     OPTICAL TRACKING SYSTEM ENDOSCOPIC ENDONASAL SURGERY N/A 12/21/2021    Procedure: Endoscopic endonasal resection of suprasellar mass;  Surgeon: Roberto Pickens MD;  Location: [facility] OR     TUBAL LIGATION  1982     Social History     Socioeconomic History     Marital status:      Spouse name: Jeb      Number of children: 2     Years of education: 14     Highest education level: Not on file   Occupational History     Occupation:       Employer: [employer]   Tobacco Use     Smoking status: Never     Smokeless tobacco: Never   Vaping Use     Vaping Use: Never used   Substance and Sexual Activity     Alcohol use: Yes     Comment: occasional      Drug use: No     Sexual activity: Yes     Partners: Male     Birth control/protection: Post-menopausal   Other Topics Concern     Parent/sibling w/ CABG, MI or angioplasty before 65F 55M? Not Asked   Social History Narrative      Not on file     Social Determinants of Health     Financial Resource Strain: Not on file   Food Insecurity: Not on file   Transportation Needs: Not on file   Physical Activity: Not on file   Stress: Not on file   Social Connections: Not on file   Intimate Partner Violence: Not on file   Housing Stability: Not on file     Family History   Problem Relation Age of Onset     Diabetes Mother      Heart Disease Mother         CHF     Respiratory Father         COPD     Cancer No family hx of      Colon Cancer No family hx of      Breast Cancer No family hx of         ROS: 10 point ROS neg other than the symptoms noted above in the HPI.    Physical Exam  There were no vitals taken for this visit.  HEENT:  Normocephalic, atraumatic.  PERRLA.  EOM s intact.  Visual fields full to gross exam  Neck:  Supple, non-tender, without lymphadenopathy.  Heart:  No peripheral edema  Lungs:  No SOB  Abdomen:  Non-distended.   Skin:  Warm and dry.  Extremities:  No edema, cyanosis or clubbing.  Psychiatric:  No apparent distress  Musculoskeletal:  Normal bulk and tone    NEUROLOGICAL EXAMINATION:     Mental status:  Alert and Oriented x 3, speech is fluent.  Cranial nerves:  II-XII intact.   Motor:    Shoulder Abduction:  Right:  5/5   Left:  5/5  Biceps:                      Right:  5/5   Left:  5/5  Triceps:                     Right:  5/5   Left:  5/5  Wrist Extensors:       Right:  5/5   Left:  5/5  Wrist Flexors:           Right:  5/5   Left:  5/5  interosseus :            Right:  5/5   Left:  5/5  Hip Flexor:                Right: 5/5  Left:  5/5  Quadriceps:             Right:  5/5  Left:  5/5  Hamstrings:             Right:  5/5  Left:  5/5  Gastroc Soleus:        Right:  5/5  Left:  5/5  Tib/Ant:                      Right:  5/5  Left:  5/5  EHL:                     Right:  5/5  Left:  5/5  Sensation:  Intact  Reflexes:  Negative Babinski.  Negative Clonus.  Negative Jerome's.  Coordination:  Smooth finger to nose testing.    Negative pronator drift.  Smooth tandem walking.  Incision well healed    A/P:  Will have patient follow up with Dr. Davis to discuss possible FESS  Will order BMP and Thyroid labs  Repeat MRI in 1 year        Again, thank you for allowing me to participate in the care of your patient.        Sincerely,        Roberto Pickens MD

## 2022-12-01 NOTE — PROGRESS NOTES
Doing well 1 year post-op.  Recently, has noted worsening headaches, worse when bending over.  She also felt very weak globally a couple weeks ago.  She still has not totally recovered her sense of smell and feels much less appetite.  They are worried that her Sodium level might be low, and think her energy was better after drinking pedialyte.  MR Brain, personally reviewed, shows no evidence of residual, but marked right maxillary sinusitis and sinus inflammation in the ethmoids and left frontal sinus.     Past Medical History:   Diagnosis Date     Heart murmur 1981    normal echo      Retinal artery occlusion     Transient retinal artery occlusion of the left eye     Sleep related leg cramps      Suprasellar mass      Past Surgical History:   Procedure Laterality Date     OPTICAL TRACKING SYSTEM ENDOSCOPIC ENDONASAL SURGERY N/A 12/21/2021    Procedure: Endoscopic endonasal resection of suprasellar mass;  Surgeon: Roberto Pickens MD;  Location: [facility]     TUBAL LIGATION  1982     Social History     Socioeconomic History     Marital status:      Spouse name: Jeb      Number of children: 2     Years of education: 14     Highest education level: Not on file   Occupational History     Occupation:       Employer: [employer]   Tobacco Use     Smoking status: Never     Smokeless tobacco: Never   Vaping Use     Vaping Use: Never used   Substance and Sexual Activity     Alcohol use: Yes     Comment: occasional      Drug use: No     Sexual activity: Yes     Partners: Male     Birth control/protection: Post-menopausal   Other Topics Concern     Parent/sibling w/ CABG, MI or angioplasty before 65F 55M? Not Asked   Social History Narrative     Not on file     Social Determinants of Health     Financial Resource Strain: Not on file   Food Insecurity: Not on file   Transportation Needs: Not on file   Physical Activity: Not on file   Stress: Not on file   Social Connections: Not on file   Intimate  Partner Violence: Not on file   Housing Stability: Not on file     Family History   Problem Relation Age of Onset     Diabetes Mother      Heart Disease Mother         CHF     Respiratory Father         COPD     Cancer No family hx of      Colon Cancer No family hx of      Breast Cancer No family hx of         ROS: 10 point ROS neg other than the symptoms noted above in the HPI.    Physical Exam  There were no vitals taken for this visit.  HEENT:  Normocephalic, atraumatic.  PERRLA.  EOM s intact.  Visual fields full to gross exam  Neck:  Supple, non-tender, without lymphadenopathy.  Heart:  No peripheral edema  Lungs:  No SOB  Abdomen:  Non-distended.   Skin:  Warm and dry.  Extremities:  No edema, cyanosis or clubbing.  Psychiatric:  No apparent distress  Musculoskeletal:  Normal bulk and tone    NEUROLOGICAL EXAMINATION:     Mental status:  Alert and Oriented x 3, speech is fluent.  Cranial nerves:  II-XII intact.   Motor:    Shoulder Abduction:  Right:  5/5   Left:  5/5  Biceps:                      Right:  5/5   Left:  5/5  Triceps:                     Right:  5/5   Left:  5/5  Wrist Extensors:       Right:  5/5   Left:  5/5  Wrist Flexors:           Right:  5/5   Left:  5/5  interosseus :            Right:  5/5   Left:  5/5  Hip Flexor:                Right: 5/5  Left:  5/5  Quadriceps:             Right:  5/5  Left:  5/5  Hamstrings:             Right:  5/5  Left:  5/5  Gastroc Soleus:        Right:  5/5  Left:  5/5  Tib/Ant:                      Right:  5/5  Left:  5/5  EHL:                     Right:  5/5  Left:  5/5  Sensation:  Intact  Reflexes:  Negative Babinski.  Negative Clonus.  Negative Jerome's.  Coordination:  Smooth finger to nose testing.   Negative pronator drift.  Smooth tandem walking.  Incision well healed    A/P:  Will have patient follow up with Dr. Davis to discuss possible FESS  Will order BMP and Thyroid labs  Repeat MRI in 1 year

## 2022-12-01 NOTE — PATIENT INSTRUCTIONS
Follow up with Dr. Davsi.    Ordered a repeat MRI w/wo contrast (pituitary protocol) in 1 year. They will call to schedule. If the don't call within 2 days, call [facility]: [phone]    [place]: [phone]     You should schedule this within one week. We will call you with the results. If you don't hear from us 7 days after the scan, please call us.    Draw T4, TSH, BMP labs today    [facility]  [facility] - [address] [address]  Telephone:  [phone]       Fax:  [phone]

## 2022-12-02 ENCOUNTER — TELEPHONE (OUTPATIENT)
Dept: NEUROSURGERY | Facility: CLINIC | Age: 67
End: 2022-12-02

## 2022-12-02 DIAGNOSIS — E89.3 S/P TRANSSPHENOIDAL HYPOPHYSECTOMY (H): Primary | ICD-10-CM

## 2022-12-02 NOTE — TELEPHONE ENCOUNTER
Per Dr. Pickens patient's blood labs look fine. Patient should see Dr. Davis.     ENT referral placed. Called patient to update, LVM to call clinic back with any questions or concerns.

## 2022-12-12 ENCOUNTER — TELEPHONE (OUTPATIENT)
Dept: NEUROSURGERY | Facility: OTHER | Age: 67
End: 2022-12-12

## 2022-12-12 NOTE — TELEPHONE ENCOUNTER
"Patient s/p Endoscopic endonasal resection of suprasellar mass 12/21/21 with Dr. Pickens.     Patient recently saw Dr. Pickens 12/1/22 and he stated \"Will have patient follow up with Dr. Davis to discuss possible FESS  Will order BMP and Thyroid labs  Repeat MRI in 1 year\".     Per chart review the next day 12/2, Dr. Pickens stated patients labs look fine and that patient should see Dr. Davis.     Patient calls today stating Dr. Pickens told her she had \" sinus inflammation\" and is wondering if she should be taking anything for that. Advised patient to contact Dr. Davis's office to further discuss.     Patient verbalized understanding and in agreement with plan.   "

## 2022-12-12 NOTE — TELEPHONE ENCOUNTER
Patient had surgery with  and is asking about inflammation and if she should be taking anything for it. Please call patient back at [phone]. Thank you~

## 2022-12-19 DIAGNOSIS — J32.0 CHRONIC MAXILLARY SINUSITIS: Primary | ICD-10-CM

## 2022-12-27 ENCOUNTER — ANCILLARY PROCEDURE (OUTPATIENT)
Dept: CT IMAGING | Facility: CLINIC | Age: 67
End: 2022-12-27
Attending: OTOLARYNGOLOGY
Payer: COMMERCIAL

## 2022-12-27 DIAGNOSIS — J32.0 CHRONIC MAXILLARY SINUSITIS: ICD-10-CM

## 2022-12-27 PROCEDURE — 70486 CT MAXILLOFACIAL W/O DYE: CPT | Mod: TC | Performed by: RADIOLOGY

## 2022-12-28 ENCOUNTER — PREP FOR PROCEDURE (OUTPATIENT)
Dept: OTOLARYNGOLOGY | Facility: CLINIC | Age: 67
End: 2022-12-28

## 2022-12-28 DIAGNOSIS — J32.9 SINUSITIS: Primary | ICD-10-CM

## 2022-12-28 RX ORDER — CEFAZOLIN SODIUM 2 G/50ML
2 SOLUTION INTRAVENOUS
Status: CANCELLED | OUTPATIENT
Start: 2022-12-28

## 2022-12-28 RX ORDER — DEXAMETHASONE SODIUM PHOSPHATE 4 MG/ML
10 INJECTION, SOLUTION INTRA-ARTICULAR; INTRALESIONAL; INTRAMUSCULAR; INTRAVENOUS; SOFT TISSUE ONCE
Status: CANCELLED | OUTPATIENT
Start: 2022-12-28 | End: 2022-12-28

## 2022-12-28 RX ORDER — CEFAZOLIN SODIUM 2 G/50ML
2 SOLUTION INTRAVENOUS SEE ADMIN INSTRUCTIONS
Status: CANCELLED | OUTPATIENT
Start: 2022-12-28

## 2022-12-29 ENCOUNTER — TELEPHONE (OUTPATIENT)
Dept: OTOLARYNGOLOGY | Facility: CLINIC | Age: 67
End: 2022-12-29

## 2022-12-29 NOTE — TELEPHONE ENCOUNTER
Surgery Teaching    1. Someone from our scheduling department will call you within approximately one week to get you scheduled with your provider for surgery. If no one has called you in one week, please notify us.    2. You must have a physical exam (called  history and physical ) within 30 days of surgery. You may complete this with your primary care provider.   A. If your provider is outside of the [facility] network please have them complete the preoperative forms provided to you in the surgery packet you will be mailed and be sure to have your provider fax them to the appropriate location prior to surgery. For surgery at the [facility] the fax number [phone]. For surgery at the [facility] the fax number is [phone].  B. In some cases we may have you see our Preoperative Assessment Center. If we have expressed this to you, our  will set up your appointment with them when they call to set up your surgery.    3. For same-day surgery, you must arrange for an adult to take you home from the Center. An adult must stay with you for the first 24 hours after surgery. You cannot drive for 24 hours.     4. Ask your doctor what medicines are safe before surgery. For over the counter medications and supplements it is advised that you do NOT TAKE MOTRIN, IBUPROFEN, ASPIRIN, ALEVE, GARLIC SUPPLEMENTS or FISH OIL x 7 days prior to surgery (to prevent excess bleeding and bruising at time of surgery). If your provider advises you to take any medication the morning of surgery you should take this with a sip of water.    5. A few days prior to surgery a nurse will call you to review your health history and instructions for before and after surgery. They will give you your final arrival time based upon your scheduled arrival time for surgery.    6. Call the surgical team if there's any change in your health prior to surgery. Things you should call for include but are not limited to signs of a cold or the flu (sore  throat, runny nose, cough, rash, fever). Other things to notify them for is for any open wounds (cuts, scrapes, scratches) near to the surgery site.    7. If you drink alcohol, stop drinking alcohol at least 24 hours before surgery.    8. If you smoke, stop or at least cut down on smoking 24 hours before surgery.    9.Take a bath or shower the night before and the morning of surgery (as told by your surgeon). Use an antiseptic soap. If your doctor does not give you special soap, buy Hibiclens or Sophia-Stat at the drug store or ask the pharmacist to suggest a brand. You will wash with this from the neck down, washing your hair and face as you would normally.   A. When you are done with your shower please be sure to use clean towels to dry with, have clean linens on your bed, and put on clean clothes each time.   B. DO NOT put on lotion, powder, perfume, deodorant or make-up after bathing.    10. You can eat a normal meal the night before surgery. Do not eat any solid foods or drink any milk products for 8 hours before surgery.     11. You may drink clear liquids until 2 hours before surgery. Clear liquids include water, Gatorade, apple juice and liquids you can see through.    12. No eating or drinking 2 hours prior to surgery until after surgery. Your post op team will review any diet limitations you might have and when you can start eating and drinking again after surgery.      If you have any questions before or after surgery please call:    GLENDA Gómez  [facility]  Department of Otolaryngology  [phone]

## 2022-12-29 NOTE — TELEPHONE ENCOUNTER
----- Message from Gladis Dacosta LPN sent at 12/28/2022  4:07 PM CST -----  Marily- surgery teaching for MELISSAS    [place]da- Schedule on a Wed before PM clinic or after AM clinic. Thanks!

## 2022-12-29 NOTE — TELEPHONE ENCOUNTER
Writer called patient to go over surgery teaching. Patient stated she was unaware that she was going to have surgery and did not know what type of surgery.     I sent a message to Dr. Davis to reach out to the patient to discuss and answer her questions.     Patient verbalized understanding. I will call again once this is done.     Marily Day RN on 12/29/2022 at 9:37 AM

## 2022-12-30 ENCOUNTER — TELEPHONE (OUTPATIENT)
Dept: OTOLARYNGOLOGY | Facility: CLINIC | Age: 67
End: 2022-12-30

## 2022-12-30 PROBLEM — J32.9 SINUSITIS: Status: ACTIVE | Noted: 2022-12-30

## 2022-12-30 NOTE — TELEPHONE ENCOUNTER
Called patient to schedule surgery with Dr. Davis    Date of Surgery: 2/22    Location of surgery: [facility]    Pre-Op H&P: PCP    Pre/Post Imaging:  Not Applicable    Discussed COVID-19 Testing: Not Applicable    Post-Op Appt Date: 1-2 weeks    Surgery Packet Mailed: 12/30      Additional comments: JAYMIE Arnold on 12/30/2022 at 1:23 PM

## 2023-02-02 ENCOUNTER — OFFICE VISIT (OUTPATIENT)
Dept: FAMILY MEDICINE | Facility: CLINIC | Age: 68
End: 2023-02-02
Payer: COMMERCIAL

## 2023-02-02 ENCOUNTER — TELEPHONE (OUTPATIENT)
Dept: FAMILY MEDICINE | Facility: CLINIC | Age: 68
End: 2023-02-02

## 2023-02-02 VITALS
OXYGEN SATURATION: 97 % | BODY MASS INDEX: 21.63 KG/M2 | SYSTOLIC BLOOD PRESSURE: 138 MMHG | WEIGHT: 126 LBS | HEART RATE: 90 BPM | TEMPERATURE: 98.2 F | DIASTOLIC BLOOD PRESSURE: 73 MMHG

## 2023-02-02 DIAGNOSIS — Z01.818 PREOP GENERAL PHYSICAL EXAM: Primary | ICD-10-CM

## 2023-02-02 DIAGNOSIS — Z12.31 VISIT FOR SCREENING MAMMOGRAM: ICD-10-CM

## 2023-02-02 PROBLEM — R51.9 FACIAL PAIN: Status: RESOLVED | Noted: 2021-12-29 | Resolved: 2023-02-02

## 2023-02-02 PROBLEM — G93.89 BRAIN MASS: Status: ACTIVE | Noted: 2021-11-16

## 2023-02-02 PROBLEM — E87.1 HYPONATREMIA: Status: RESOLVED | Noted: 2021-12-29 | Resolved: 2023-02-02

## 2023-02-02 PROBLEM — R55 SYNCOPE AND COLLAPSE: Status: RESOLVED | Noted: 2021-12-29 | Resolved: 2023-02-02

## 2023-02-02 LAB — HGB BLD-MCNC: 12.2 G/DL (ref 11.7–15.7)

## 2023-02-02 PROCEDURE — 85018 HEMOGLOBIN: CPT | Performed by: FAMILY MEDICINE

## 2023-02-02 PROCEDURE — 99214 OFFICE O/P EST MOD 30 MIN: CPT | Performed by: FAMILY MEDICINE

## 2023-02-02 PROCEDURE — 93000 ELECTROCARDIOGRAM COMPLETE: CPT | Performed by: FAMILY MEDICINE

## 2023-02-02 PROCEDURE — 36415 COLL VENOUS BLD VENIPUNCTURE: CPT | Performed by: FAMILY MEDICINE

## 2023-02-02 ASSESSMENT — PAIN SCALES - GENERAL: PAINLEVEL: NO PAIN (0)

## 2023-02-02 NOTE — TELEPHONE ENCOUNTER
"Spoke with pt and attempt to schedule AWV with RN. Pt stated not interested at this time. Edu that these visits are a good opportunity to go over any concerns that may have been missed or any new concerns the pt may have. Pt stated \"not at this time I have a lot of upcoming appts and surgery\".     Thanks,  EVANGELINA Doe  [facility]     "

## 2023-02-02 NOTE — PROGRESS NOTES
[facility]  [address] [place] [address]  [place] [address]  Phone: [phone]  Primary Provider: Isis Sargent  Pre-op Performing Provider: ISIS SARGENT      PREOPERATIVE EVALUATION:  Today's date: 2/2/2023    Diana Thompson is a 67 year old female who presents for a preoperative evaluation.    Surgical Information:  Surgery/Procedure: FUNCTIONAL ENDOSCOPIC SINUS SURGERY (FESS)  [place] [facility]  [place]geon: Dr. Eamon Davis  Surgery Date: February 22, 2023  Time of Surgery: 9:15 am  Where patient plans to recover: At home with family  Fax number for surgical facility: Note does not need to be faxed, will be available electronically in Epic.    Type of Anesthesia Anticipated: General    Assessment & Plan     The proposed surgical procedure is considered INTERMEDIATE risk.    Preop general physical exam    - Hemoglobin; Future  - EKG 12-lead complete w/read - Clinics  - Hemoglobin    Visit for screening mammogram  Advised   - MA Screening Digital Bilateral; Future           Risks and Recommendations:  The patient has the following additional risks and recommendations for perioperative complications:   - No identified additional risk factors other than previously addressed    Medication Instructions:  Patient is to take all scheduled medications on the day of surgery EXCEPT for modifications listed below:  stop all OTC meds , asa, omega 3 FA, MVT 1 week before surgery    RECOMMENDATION:  APPROVAL GIVEN to proceed with proposed procedure, without further diagnostic evaluation.    Review of external notes as documented elsewhere in note    Subjective     HPI related to upcoming procedure: pt has sinusitis and scheduled for above surgery    Preop Questions 2/2/2023   1. Have you ever had a heart attack or stroke? No   2. Have you ever had surgery on your heart or blood vessels, such as a stent placement, a coronary artery bypass, or surgery on an artery in your head, neck, heart, or legs? No   3. Do  you have chest pain with activity? No   4. Do you have a history of  heart failure? No   5. Do you currently have a cold, bronchitis or symptoms of other infection? No   6. Do you have a cough, shortness of breath, or wheezing? No   7. Do you or anyone in your family have previous history of blood clots? No   8. Do you or does anyone in your family have a serious bleeding problem such as prolonged bleeding following surgeries or cuts? No   9. Have you ever had problems with anemia or been told to take iron pills? no   10. Have you had any abnormal blood loss such as black, tarry or bloody stools, or abnormal vaginal bleeding? No   11. Have you ever had a blood transfusion? No   11a. Have you ever had a transfusion reaction? -   12. Are you willing to have a blood transfusion if it is medically needed before, during, or after your surgery? Yes   13. Have you or any of your relatives ever had problems with anesthesia? No   14. Do you have sleep apnea, excessive snoring or daytime drowsiness? No   14a. Do you have a CPAP machine? -   15. Do you have any artifical heart valves or other implanted medical devices like a pacemaker, defibrillator, or continuous glucose monitor? No   16. Do you have artificial joints? No   17. Are you allergic to latex? No       Health Care Directive:  Patient does not have a Health Care Directive or Living Will: Discussed advance care planning with patient; information given to patient to review.    Preoperative Review of :   reviewed - no record of controlled substances prescribed.    Review of Systems  CONSTITUTIONAL: NEGATIVE for fever, chills, change in weight  INTEGUMENTARY/SKIN: NEGATIVE for worrisome rashes, moles or lesions  EYES: NEGATIVE for vision changes or irritation  ENT/MOUTH: sinusitis  RESP: NEGATIVE for significant cough or SOB  CV: NEGATIVE for chest pain, palpitations or peripheral edema  GI: NEGATIVE for nausea, abdominal pain, heartburn, or change in bowel  habits  : NEGATIVE for frequency, dysuria, or hematuria  MUSCULOSKELETAL: NEGATIVE for significant arthralgias or myalgia  NEURO: NEGATIVE for weakness, dizziness or paresthesias  ENDOCRINE: NEGATIVE for temperature intolerance, skin/hair changes  HEME: NEGATIVE for bleeding problems  PSYCHIATRIC: NEGATIVE for changes in mood or affect    Patient Active Problem List    Diagnosis Date Noted     Sinusitis 12/30/2022     Priority: Medium     Added automatically from request for surgery [id number]       Trigeminal neuralgia 12/29/2021     Priority: Medium     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  Diana Thompson has no plan or code status on file. Discussed available resources and provided with information. Confirmed code status reflects current choices pending further ACP discussions.  Confirmed/documented designated decision maker(s). See permanent comments section of demographics in clinical tab.  Added by Melissa Behl on 1/22/2014            CARDIOVASCULAR SCREENING; LDL GOAL LESS THAN 160 01/09/2014     Priority: Medium      Past Medical History:   Diagnosis Date     Heart murmur 1981    normal echo      Retinal artery occlusion     Transient retinal artery occlusion of the left eye     Sleep related leg cramps      Suprasellar mass      Past Surgical History:   Procedure Laterality Date     OPTICAL TRACKING SYSTEM ENDOSCOPIC ENDONASAL SURGERY N/A 12/21/2021    Procedure: Endoscopic endonasal resection of suprasellar mass;  Surgeon: Roberto Pickens MD;  Location: [facility] OR     TUBAL LIGATION  1982     Current Outpatient Medications   Medication Sig  Dispense Refill     acetaminophen (TYLENOL) 500 MG tablet Take 1,000 mg by mouth every 6 hours as needed for pain        calcium gluconate 500 MG tablet Take 1,000 mg by mouth daily       Multiple Vitamins-Minerals (MULTIVITAMIN ADULTS PO) Take 1-2 Tablespoonful by mouth       Omega-3 Fatty Acids (FISH OIL PO)        sodium chloride (OCEAN) 0.65 % nasal spray Spray 1 spray into both nostrils daily as needed for congestion         Allergies   Allergen Reactions     Macrodantin [Nitrofurantoin] Hives     Sulfa Drugs         Social History     Tobacco Use     Smoking status: Never     Smokeless tobacco: Never   Substance Use Topics     Alcohol use: Yes     Comment: occasional      Family History   Problem Relation Age of Onset     Diabetes Mother      Heart Disease Mother         CHF     Respiratory Father         COPD     Cancer No family hx of      Colon Cancer No family hx of      Breast Cancer No family hx of      History   Drug Use No         Objective     /73   Pulse 90   Temp 98.2  F (36.8  C) (Temporal)   Wt 57.2 kg (126 lb)   SpO2 97%   BMI 21.63 kg/m      Physical Exam    GENERAL APPEARANCE: healthy, alert and no distress     EYES: EOMI, PERRL     HENT: ear canals and TM's normal and nose and mouth without ulcers or lesions     NECK: no adenopathy, no asymmetry, masses, or scars and thyroid normal to palpation     RESP: lungs clear to auscultation - no rales, rhonchi or wheezes     CV: regular rates and rhythm, normal S1 S2, no S3 or S4 and no murmur, click or rub     ABDOMEN:  soft, nontender, no HSM or masses and bowel sounds normal     MS: extremities normal- no gross deformities noted, no evidence of inflammation in joints, FROM in all extremities.     SKIN: no suspicious lesions or rashes     NEURO: Normal strength and tone, sensory exam grossly normal, mentation intact and speech normal     PSYCH: mentation appears normal. and affect normal/bright     LYMPHATICS: No cervical  adenopathy    Recent Labs   Lab Test 12/01/22  1524 01/18/22  1040 01/06/22  1231 01/04/22  1301 12/30/21  1010 12/30/21  0527   HGB  --   --   --  10.2*  --  10.6*   PLT  --   --   --  365  --  307    139 134 133   < > 126*  126*   POTASSIUM 4.7  --  4.8 4.7  --  4.3   CR 0.74  --  0.74 0.60  --  0.78    < > = values in this interval not displayed.        Diagnostics:  Labs pending at this time.  Results will be reviewed when available.   EKG: sinus bradycardia, normal axis, normal intervals, no acute ST/T changes c/w ischemia, no LVH by voltage criteria, unchanged from previous tracings    Revised Cardiac Risk Index (RCRI):  The patient has the following serious cardiovascular risks for perioperative complications:   - No serious cardiac risks = 0 points     RCRI Interpretation: 0 points: Class I (very low risk - 0.4% complication rate)           Signed Electronically by: Isis Hernández MD  Copy of this evaluation report is provided to requesting physician.

## 2023-02-02 NOTE — TELEPHONE ENCOUNTER
----- Message from Isis Hernández MD sent at 2/2/2023  8:33 AM CST -----  Medicare wellness exam

## 2023-02-21 ENCOUNTER — ANESTHESIA EVENT (OUTPATIENT)
Dept: SURGERY | Facility: AMBULATORY SURGERY CENTER | Age: 68
End: 2023-02-21
Payer: COMMERCIAL

## 2023-02-22 ENCOUNTER — ANESTHESIA (OUTPATIENT)
Dept: SURGERY | Facility: AMBULATORY SURGERY CENTER | Age: 68
End: 2023-02-22
Payer: COMMERCIAL

## 2023-02-22 ENCOUNTER — HOSPITAL ENCOUNTER (OUTPATIENT)
Facility: AMBULATORY SURGERY CENTER | Age: 68
Discharge: HOME | End: 2023-02-22
Attending: OTOLARYNGOLOGY
Payer: COMMERCIAL

## 2023-02-22 VITALS
HEIGHT: 64 IN | OXYGEN SATURATION: 96 % | WEIGHT: 126 LBS | BODY MASS INDEX: 21.51 KG/M2 | RESPIRATION RATE: 14 BRPM | DIASTOLIC BLOOD PRESSURE: 74 MMHG | SYSTOLIC BLOOD PRESSURE: 126 MMHG | TEMPERATURE: 97.6 F | HEART RATE: 66 BPM

## 2023-02-22 DIAGNOSIS — J01.00 ACUTE MAXILLARY SINUSITIS, RECURRENCE NOT SPECIFIED: Primary | ICD-10-CM

## 2023-02-22 DIAGNOSIS — J32.9 SINUSITIS: ICD-10-CM

## 2023-02-22 PROCEDURE — 31254 NSL/SINS NDSC W/PRTL ETHMDCT: CPT | Mod: 50 | Performed by: OTOLARYNGOLOGY

## 2023-02-22 PROCEDURE — 31256 EXPLORATION MAXILLARY SINUS: CPT | Mod: 50 | Performed by: OTOLARYNGOLOGY

## 2023-02-22 PROCEDURE — 88311 DECALCIFY TISSUE: CPT | Mod: TC | Performed by: OTOLARYNGOLOGY

## 2023-02-22 PROCEDURE — 88311 DECALCIFY TISSUE: CPT | Mod: 26 | Performed by: STUDENT IN AN ORGANIZED HEALTH CARE EDUCATION/TRAINING PROGRAM

## 2023-02-22 PROCEDURE — 31256 EXPLORATION MAXILLARY SINUS: CPT | Mod: RT

## 2023-02-22 PROCEDURE — 88305 TISSUE EXAM BY PATHOLOGIST: CPT | Mod: 26 | Performed by: STUDENT IN AN ORGANIZED HEALTH CARE EDUCATION/TRAINING PROGRAM

## 2023-02-22 PROCEDURE — 31254 NSL/SINS NDSC W/PRTL ETHMDCT: CPT | Mod: LT

## 2023-02-22 PROCEDURE — 88305 TISSUE EXAM BY PATHOLOGIST: CPT | Mod: TC | Performed by: OTOLARYNGOLOGY

## 2023-02-22 RX ORDER — ACETAMINOPHEN 325 MG/1
975 TABLET ORAL ONCE
Status: COMPLETED | OUTPATIENT
Start: 2023-02-22 | End: 2023-02-22

## 2023-02-22 RX ORDER — PROPOFOL 10 MG/ML
INJECTION, EMULSION INTRAVENOUS PRN
Status: DISCONTINUED | OUTPATIENT
Start: 2023-02-22 | End: 2023-02-22

## 2023-02-22 RX ORDER — FENTANYL CITRATE 50 UG/ML
50 INJECTION, SOLUTION INTRAMUSCULAR; INTRAVENOUS EVERY 5 MIN PRN
Status: DISCONTINUED | OUTPATIENT
Start: 2023-02-22 | End: 2023-02-23 | Stop reason: HOSPADM

## 2023-02-22 RX ORDER — CEFAZOLIN SODIUM 2 G/50ML
2 SOLUTION INTRAVENOUS
Status: COMPLETED | OUTPATIENT
Start: 2023-02-22 | End: 2023-02-22

## 2023-02-22 RX ORDER — CEFAZOLIN SODIUM 2 G/50ML
2 SOLUTION INTRAVENOUS SEE ADMIN INSTRUCTIONS
Status: DISCONTINUED | OUTPATIENT
Start: 2023-02-22 | End: 2023-02-22 | Stop reason: HOSPADM

## 2023-02-22 RX ORDER — ONDANSETRON 4 MG/1
4 TABLET, ORALLY DISINTEGRATING ORAL EVERY 8 HOURS PRN
Qty: 4 TABLET | Refills: 0 | Status: SHIPPED | OUTPATIENT
Start: 2023-02-22 | End: 2023-03-08

## 2023-02-22 RX ORDER — FENTANYL CITRATE 50 UG/ML
INJECTION, SOLUTION INTRAMUSCULAR; INTRAVENOUS PRN
Status: DISCONTINUED | OUTPATIENT
Start: 2023-02-22 | End: 2023-02-22

## 2023-02-22 RX ORDER — ONDANSETRON 4 MG/1
4 TABLET, ORALLY DISINTEGRATING ORAL EVERY 30 MIN PRN
Status: DISCONTINUED | OUTPATIENT
Start: 2023-02-22 | End: 2023-02-23 | Stop reason: HOSPADM

## 2023-02-22 RX ORDER — HYDROMORPHONE HYDROCHLORIDE 1 MG/ML
0.2 INJECTION, SOLUTION INTRAMUSCULAR; INTRAVENOUS; SUBCUTANEOUS EVERY 5 MIN PRN
Status: DISCONTINUED | OUTPATIENT
Start: 2023-02-22 | End: 2023-02-23 | Stop reason: HOSPADM

## 2023-02-22 RX ORDER — OXYMETAZOLINE HYDROCHLORIDE 0.05 G/100ML
SPRAY NASAL PRN
Status: DISCONTINUED | OUTPATIENT
Start: 2023-02-22 | End: 2023-02-22 | Stop reason: HOSPADM

## 2023-02-22 RX ORDER — OXYCODONE HYDROCHLORIDE 5 MG/1
5 TABLET ORAL EVERY 4 HOURS PRN
Status: DISCONTINUED | OUTPATIENT
Start: 2023-02-22 | End: 2023-02-23 | Stop reason: HOSPADM

## 2023-02-22 RX ORDER — ECHINACEA PURPUREA EXTRACT 125 MG
2 TABLET ORAL 3 TIMES DAILY
Qty: 104 ML | Refills: 11 | Status: SHIPPED | OUTPATIENT
Start: 2023-02-22 | End: 2023-03-08

## 2023-02-22 RX ORDER — OXYCODONE HYDROCHLORIDE 5 MG/1
10 TABLET ORAL EVERY 4 HOURS PRN
Status: DISCONTINUED | OUTPATIENT
Start: 2023-02-22 | End: 2023-02-23 | Stop reason: HOSPADM

## 2023-02-22 RX ORDER — EPHEDRINE SULFATE 50 MG/ML
INJECTION, SOLUTION INTRAMUSCULAR; INTRAVENOUS; SUBCUTANEOUS PRN
Status: DISCONTINUED | OUTPATIENT
Start: 2023-02-22 | End: 2023-02-22

## 2023-02-22 RX ORDER — PROPOFOL 10 MG/ML
INJECTION, EMULSION INTRAVENOUS CONTINUOUS PRN
Status: DISCONTINUED | OUTPATIENT
Start: 2023-02-22 | End: 2023-02-22

## 2023-02-22 RX ORDER — LIDOCAINE HYDROCHLORIDE 20 MG/ML
INJECTION, SOLUTION INFILTRATION; PERINEURAL PRN
Status: DISCONTINUED | OUTPATIENT
Start: 2023-02-22 | End: 2023-02-22

## 2023-02-22 RX ORDER — SODIUM CHLORIDE, SODIUM LACTATE, POTASSIUM CHLORIDE, CALCIUM CHLORIDE 600; 310; 30; 20 MG/100ML; MG/100ML; MG/100ML; MG/100ML
INJECTION, SOLUTION INTRAVENOUS CONTINUOUS
Status: DISCONTINUED | OUTPATIENT
Start: 2023-02-22 | End: 2023-02-22 | Stop reason: HOSPADM

## 2023-02-22 RX ORDER — ACETAMINOPHEN 325 MG/1
650 TABLET ORAL EVERY 6 HOURS PRN
Qty: 50 TABLET | Refills: 0 | Status: SHIPPED | OUTPATIENT
Start: 2023-02-22

## 2023-02-22 RX ORDER — HYDROMORPHONE HYDROCHLORIDE 1 MG/ML
0.4 INJECTION, SOLUTION INTRAMUSCULAR; INTRAVENOUS; SUBCUTANEOUS EVERY 5 MIN PRN
Status: DISCONTINUED | OUTPATIENT
Start: 2023-02-22 | End: 2023-02-23 | Stop reason: HOSPADM

## 2023-02-22 RX ORDER — LIDOCAINE 40 MG/G
CREAM TOPICAL
Status: DISCONTINUED | OUTPATIENT
Start: 2023-02-22 | End: 2023-02-22 | Stop reason: HOSPADM

## 2023-02-22 RX ORDER — SODIUM CHLORIDE, SODIUM LACTATE, POTASSIUM CHLORIDE, CALCIUM CHLORIDE 600; 310; 30; 20 MG/100ML; MG/100ML; MG/100ML; MG/100ML
INJECTION, SOLUTION INTRAVENOUS CONTINUOUS
Status: DISCONTINUED | OUTPATIENT
Start: 2023-02-22 | End: 2023-02-23 | Stop reason: HOSPADM

## 2023-02-22 RX ORDER — OXYCODONE HYDROCHLORIDE 5 MG/1
5 TABLET ORAL EVERY 6 HOURS PRN
Qty: 8 TABLET | Refills: 0 | Status: SHIPPED | OUTPATIENT
Start: 2023-02-22 | End: 2023-03-08

## 2023-02-22 RX ORDER — ONDANSETRON 2 MG/ML
4 INJECTION INTRAMUSCULAR; INTRAVENOUS EVERY 30 MIN PRN
Status: DISCONTINUED | OUTPATIENT
Start: 2023-02-22 | End: 2023-02-23 | Stop reason: HOSPADM

## 2023-02-22 RX ORDER — LIDOCAINE HYDROCHLORIDE AND EPINEPHRINE 10; 10 MG/ML; UG/ML
INJECTION, SOLUTION INFILTRATION; PERINEURAL PRN
Status: DISCONTINUED | OUTPATIENT
Start: 2023-02-22 | End: 2023-02-22 | Stop reason: HOSPADM

## 2023-02-22 RX ORDER — AMOXICILLIN 250 MG
1-2 CAPSULE ORAL 2 TIMES DAILY
Qty: 30 TABLET | Refills: 0 | Status: SHIPPED | OUTPATIENT
Start: 2023-02-22 | End: 2023-03-08

## 2023-02-22 RX ORDER — FENTANYL CITRATE 50 UG/ML
25 INJECTION, SOLUTION INTRAMUSCULAR; INTRAVENOUS EVERY 5 MIN PRN
Status: DISCONTINUED | OUTPATIENT
Start: 2023-02-22 | End: 2023-02-23 | Stop reason: HOSPADM

## 2023-02-22 RX ORDER — DEXAMETHASONE SODIUM PHOSPHATE 10 MG/ML
10 INJECTION, SOLUTION INTRAMUSCULAR; INTRAVENOUS ONCE
Status: DISCONTINUED | OUTPATIENT
Start: 2023-02-22 | End: 2023-02-22 | Stop reason: HOSPADM

## 2023-02-22 RX ADMIN — FENTANYL CITRATE 50 MCG: 50 INJECTION, SOLUTION INTRAMUSCULAR; INTRAVENOUS at 09:48

## 2023-02-22 RX ADMIN — PROPOFOL 150 MG: 10 INJECTION, EMULSION INTRAVENOUS at 09:10

## 2023-02-22 RX ADMIN — PROPOFOL 150 MCG/KG/MIN: 10 INJECTION, EMULSION INTRAVENOUS at 09:10

## 2023-02-22 RX ADMIN — ACETAMINOPHEN 975 MG: 325 TABLET ORAL at 07:26

## 2023-02-22 RX ADMIN — LIDOCAINE HYDROCHLORIDE 80 MG: 20 INJECTION, SOLUTION INFILTRATION; PERINEURAL at 09:10

## 2023-02-22 RX ADMIN — EPHEDRINE SULFATE 5 MG: 50 INJECTION, SOLUTION INTRAMUSCULAR; INTRAVENOUS; SUBCUTANEOUS at 09:17

## 2023-02-22 RX ADMIN — Medication 80 MG: at 09:10

## 2023-02-22 RX ADMIN — SODIUM CHLORIDE, SODIUM LACTATE, POTASSIUM CHLORIDE, CALCIUM CHLORIDE: 600; 310; 30; 20 INJECTION, SOLUTION INTRAVENOUS at 07:30

## 2023-02-22 RX ADMIN — FENTANYL CITRATE 50 MCG: 50 INJECTION, SOLUTION INTRAMUSCULAR; INTRAVENOUS at 09:10

## 2023-02-22 RX ADMIN — CEFAZOLIN SODIUM 2 G: 2 SOLUTION INTRAVENOUS at 09:05

## 2023-02-22 NOTE — OP NOTE
Date of surgery: 2/22/2023    Surgeon: Eamon Davis MD    Resident Surgeon: Kendy Whitten MD, Sarkis Bowers MD    Operation:  1.  Functional endoscopic sinus surgery-maxillary antrostomy, bilateral; anterior ethmoidectomy, bilateral    Preoperative diagnosis: Sinusitis    Postoperative diagnosis: Same    Indications: This is a 67-year-old female who presented to clinic with signs and symptoms of recurrent sinusitis after she had previously had a pituitary resection.  After discussion of the risks and benefits the above operation the patient elected to proceed    Anesthesia: General    EBL: 50 cc    Specimens: Sinus contents    Implants: None    Findings: No purulence present in maxillary sinuses.  Right and left maxillary antrostomies and anterior ethmoidectomies performed.    Description of procedure: The patient is brought back the operating by the anesthesia team and induced into a plane of anesthesia and intubated.  The head of the bed is rotated 90 degrees face the operating team.  A timeout was performed identify the patient, procedure, and all operating staff are in agreement.  The patient was prepped and draped in the usual fashion.  1% lidocaine with 1:100,000 epinephrine was injected in the lateral nasal walls, middle turbinates, and inferior turbinates bilaterally.  A backbiter was used to create a maxillary antrostomy on the right side.  The antrostomy was widened with a straight Gabriel-Cut and sinus shaver.  The exact same procedure was performed on the left side.  No purulence was found in the maxillary sinuses. The anterior ethmoid air cells were opened using correct and Gabriel-Cut bilaterally.  Hemostasis had been obtained.  The patient was then turned over to the anesthesia team for extubation and to bring the patient to the PACU in stable condition.    Complications: None    Dr. Davis was present during the critical portions of the case    Sarkis Bowers MD  ENT resident

## 2023-02-22 NOTE — ANESTHESIA CARE TRANSFER NOTE
Patient: Diana Thompson    Procedure: Procedure(s):  FUNCTIONAL ENDOSCOPIC SINUS SURGERY (FESS)       Diagnosis: Sinusitis [J32.9]  Diagnosis Additional Information: No value filed.    Anesthesia Type:   No value filed.     Note:    Oropharynx: oropharynx clear of all foreign objects and spontaneously breathing  Level of Consciousness: awake and drowsy  Oxygen Supplementation: face mask  Level of Supplemental Oxygen (L/min / FiO2): 6    Dentition: dentition unchanged  Vital Signs Stable: post-procedure vital signs reviewed and stable  Report to RN Given: handoff report given  Patient transferred to: PACU    Handoff Report: Identifed the Patient, Identified the Reponsible Provider, Reviewed the pertinent medical history, Discussed the surgical course, Reviewed Intra-OP anesthesia mangement and issues during anesthesia, Set expectations for post-procedure period and Allowed opportunity for questions and acknowledgement of understanding      Vitals:  Vitals Value Taken Time   /67    Temp     Pulse 78    Resp     SpO2 97        Electronically Signed By: LETI Hill CRNA  February 22, 2023  10:43 AM

## 2023-02-22 NOTE — BRIEF OP NOTE
[facility]    Brief Operative Note    Pre-operative diagnosis: Sinusitis [J32.9]  Post-operative diagnosis Same as pre-operative diagnosis    Procedure: Procedure(s):  FUNCTIONAL ENDOSCOPIC SINUS SURGERY (FESS)  Surgeon: Surgeon(s) and Role:     * Eamon Davis MD - Primary     * Sarkis Bowers MD - Resident - Assisting     * Kendy Whitten MD - Resident - Assisting  Anesthesia: General   Estimated Blood Loss: 50 cc    Drains: None  Specimens:   ID Type Source Tests Collected by Time Destination   1 :  Sinus Contents Sinus SURGICAL PATHOLOGY EXAM Sarkis Bowers MD 2/22/2023 10:11 AM      Findings:   None.  Complications: None.  Implants: * No implants in log *

## 2023-02-22 NOTE — DISCHARGE INSTRUCTIONS
[facility]  Home Care Following Anesthesia  For 24 hours after surgery:  Get plenty of rest.  A responsible adult must stay with you for at least 24 hours after you leave the surgery center.  Do not drive or use heavy equipment.  If you have weakness or tingling, don't drive or use heavy equipment until this feeling goes away.   Do not drink alcohol.   Avoid strenuous or risky activities.  Ask for help when climbing stairs.  You may feel lightheaded.  IF so, sit for a few minutes before standing.  Have someone help you get up.   If you have nausea (feel sick to your stomach): Drink only clear liquids such as apple juice, ginger ale, broth or 7-Up.  Rest may also help.  Be sure to drink enough fluids.  Move to a regular diet as you feel able.   You may have a slight fever.  Call the doctor if your fever is over 100 F (37.7 C) (taken under the tongue) or lasts longer than 24 hours.  You may have a dry mouth, a sore throat, muscle aches or trouble sleeping. These should go away after 24 hours.  Do not make important or legal decisions.   It is recommended to avoid smoking.               Tips for taking pain medications  To get the best pain relief possible, remember these points:  Take pain medications as directed, before pain becomes severe.  Pain medication can upset your stomach: taking it with food may help.  Constipation is a common side effect of pain medication. Drink plenty of  fluids.  Eat foods high in fiber. Take a stool softener if recommended by your doctor or pharmacist.  Do not drink alcohol, drive or operate machinery while taking pain medications.  Ask about other ways to control pain, such as with heat, ice or relaxation.    Tylenol/Acetaminophen Consumption  To help encourage the safe use of acetaminophen, the makers of TYLENOL  have lowered the maximum daily dose for single-ingredient Extra Strength TYLENOL  (acetaminophen) products sold in the [place] from 8 pills  per day (4,000 mg) to 6 pills per day (3,000 mg). The dosing interval has also changed from 2 pills every 4-6 hours to 2 pills every 6 hours.  If you feel your pain relief is insufficient, you may take Tylenol/Acetaminophen in addition to your narcotic pain medication.   Be careful not to exceed 3,000 mg of Tylenol/Acetaminophen in a 24 hour period from all sources.  If you are taking extra strength Tylenol/acetaminophen (500 mg), the maximum dose is 6 tablets in 24 hours.  If you are taking regular strength acetaminophen (325 mg), the maximum dose is 9 tablets in 24 hours.    Call a doctor for any of the following:  Signs of infection (fever, growing tenderness at the surgery site, a large amount of drainage or bleeding, severe pain, foul-smelling drainage, redness, swelling).  It has been over 8 to 10 hours since surgery and you are still not able to urinate (pass water).  Headache for over 24 hours.  Numbness, tingling or weakness the day after surgery (if you had spinal anesthesia).  Signs of Covid-19 infection (temperature over 100 degrees, shortness of breath, cough, loss of taste/smell, generalized body aches, persistent headache, chills, sore throat, nausea/vomiting/diarrhea)  Your doctor is:  Dr. Eamon Davis, ENT Otolaryngology: [phone]                    Or dial [phone] and ask for the resident on call for:  ENT Otolaryngology  For emergency care, call the:  [facility]:  [phone] (TTY for hearing impaired: [phone])

## 2023-02-23 LAB
PATH REPORT.COMMENTS IMP SPEC: NORMAL
PATH REPORT.COMMENTS IMP SPEC: NORMAL
PATH REPORT.FINAL DX SPEC: NORMAL
PATH REPORT.GROSS SPEC: NORMAL
PATH REPORT.MICROSCOPIC SPEC OTHER STN: NORMAL
PATH REPORT.RELEVANT HX SPEC: NORMAL
PHOTO IMAGE: NORMAL

## 2023-02-24 NOTE — ANESTHESIA PREPROCEDURE EVALUATION
Anesthesia Pre-Procedure Evaluation    Patient: Diana Thompson   MRN: [id number] : 1955        Procedure : Procedure(s):  FUNCTIONAL ENDOSCOPIC SINUS SURGERY (FESS)          Past Medical History:   Diagnosis Date     Heart murmur     normal echo      Retinal artery occlusion     Transient retinal artery occlusion of the left eye     Sleep related leg cramps      Suprasellar mass       Past Surgical History:   Procedure Laterality Date     ENDOSCOPIC ENDONASAL SURGERY N/A 2023    Procedure: FUNCTIONAL ENDOSCOPIC SINUS SURGERY (FESS);  Surgeon: Eamon Davis MD;  Location: [facility] OR     OPTICAL TRACKING SYSTEM ENDOSCOPIC ENDONASAL SURGERY N/A 2021    Procedure: Endoscopic endonasal resection of suprasellar mass;  Surgeon: Roberto Pickens MD;  Location:  OR     TUBAL LIGATION        Allergies   Allergen Reactions     Macrodantin [Nitrofurantoin] Hives     Sulfa Drugs       Social History     Tobacco Use     Smoking status: Never     Smokeless tobacco: Never   Substance Use Topics     Alcohol use: Yes     Comment: occasional       Wt Readings from Last 1 Encounters:   23 57.2 kg (126 lb)        Anesthesia Evaluation   Pt has had prior anesthetic.     No history of anesthetic complications       ROS/MED HX  ENT/Pulmonary:  - neg pulmonary ROS     Neurologic: Comment: Suprasellar mass s/p rsxn      Cardiovascular:  - neg cardiovascular ROS  (-) CAD   METS/Exercise Tolerance: >4 METS    Hematologic:       Musculoskeletal:       GI/Hepatic:    (-) GERD and liver disease   Renal/Genitourinary:    (-) renal disease   Endo:    (-) Type II DM   Psychiatric/Substance Use:       Infectious Disease:       Malignancy:       Other:            Physical Exam    Airway  airway exam normal           Respiratory Devices and Support         Dental           Cardiovascular   cardiovascular exam normal          Pulmonary   pulmonary exam normal                OUTSIDE LABS:  CBC:   Lab Results    Component Value Date    WBC 6.4 01/04/2022    WBC 7.9 12/30/2021    HGB 12.2 02/02/2023    HGB 10.2 (L) 01/04/2022    HCT 30.7 (L) 01/04/2022    HCT 31.9 (L) 12/30/2021     01/04/2022     12/30/2021     BMP:   Lab Results   Component Value Date     12/01/2022     01/18/2022    POTASSIUM 4.7 12/01/2022    POTASSIUM 4.8 01/06/2022    CHLORIDE 107 12/01/2022    CHLORIDE 100 01/06/2022    CO2 29 12/01/2022    CO2 26 01/06/2022    BUN 15 12/01/2022    BUN 13 01/06/2022    CR 0.74 12/01/2022    CR 0.74 01/06/2022    GLC 93 12/01/2022    GLC 95 01/06/2022     COAGS: No results found for: PTT, INR, FIBR  POC: No results found for: BGM, HCG, HCGS  HEPATIC:   Lab Results   Component Value Date    ALBUMIN 3.4 01/04/2022    PROTTOTAL 7.2 01/04/2022    ALT 34 01/04/2022    AST 43 01/04/2022    ALKPHOS 112 01/04/2022    BILITOTAL 0.2 01/04/2022     OTHER:   Lab Results   Component Value Date    LA 10.1 12/01/2022    TSH 1.74 12/01/2022    T4 0.87 12/01/2022       Anesthesia Plan    ASA Status:  2   NPO Status:  NPO Appropriate    Anesthesia Type: General.     - Airway: ETT   Induction: Intravenous.   Maintenance: Balanced.   Techniques and Equipment:     - Airway: Oral NATALI         Consents    Anesthesia Plan(s) and associated risks, benefits, and realistic alternatives discussed. Questions answered and patient/representative(s) expressed understanding.    - Discussed:     - Discussed with:  Patient         Postoperative Care    Pain management: IV analgesics, Oral pain medications, Multi-modal analgesia.   PONV prophylaxis: Ondansetron (or other 5HT-3), Dexamethasone or Solumedrol, Background Propofol Infusion     Comments:                Barney Fuller MD

## 2023-02-24 NOTE — ANESTHESIA POSTPROCEDURE EVALUATION
Patient: Diana Thompson    Procedure: Procedure(s):  FUNCTIONAL ENDOSCOPIC SINUS SURGERY (FESS)       Anesthesia Type:  General    Note:  Disposition: Outpatient   Postop Pain Control: Uneventful            Sign Out: Well controlled pain   PONV: No   Neuro/Psych: Uneventful            Sign Out: Acceptable/Baseline neuro status   Airway/Respiratory: Uneventful            Sign Out: Acceptable/Baseline resp. status   CV/Hemodynamics: Uneventful            Sign Out: Acceptable CV status; No obvious hypovolemia; No obvious fluid overload   Other NRE: NONE   DID A NON-ROUTINE EVENT OCCUR? No           Last vitals:  Vitals Value Taken Time   /67 02/22/23 1050   Temp 36.2  C (97.2  F) 02/22/23 1050   Pulse 63 02/22/23 1050   Resp 10 02/22/23 1050   SpO2 94 % 02/22/23 1050   Vitals shown include unvalidated device data.    Electronically Signed By: Barney Fuller MD  February 23, 2023  7:43 PM

## 2023-03-08 ENCOUNTER — OFFICE VISIT (OUTPATIENT)
Dept: OTOLARYNGOLOGY | Facility: CLINIC | Age: 68
End: 2023-03-08
Payer: COMMERCIAL

## 2023-03-08 VITALS
SYSTOLIC BLOOD PRESSURE: 175 MMHG | BODY MASS INDEX: 21.68 KG/M2 | OXYGEN SATURATION: 100 % | DIASTOLIC BLOOD PRESSURE: 78 MMHG | TEMPERATURE: 97.4 F | WEIGHT: 127 LBS | HEART RATE: 80 BPM | HEIGHT: 64 IN

## 2023-03-08 DIAGNOSIS — J32.0 CHRONIC MAXILLARY SINUSITIS: Primary | ICD-10-CM

## 2023-03-08 DIAGNOSIS — E89.3 STATUS POST TRANSSPHENOIDAL PITUITARY RESECTION (H): ICD-10-CM

## 2023-03-08 PROCEDURE — 99212 OFFICE O/P EST SF 10 MIN: CPT | Performed by: OTOLARYNGOLOGY

## 2023-03-08 ASSESSMENT — PAIN SCALES - GENERAL: PAINLEVEL: NO PAIN (0)

## 2023-03-08 NOTE — NURSING NOTE
"Chief Complaint   Patient presents with     RECHECK     2 week post op      Blood pressure (!) 175/78, pulse 80, temperature 97.4  F (36.3  C), height 1.626 m (5' 4\"), weight 57.6 kg (127 lb), SpO2 100 %, not currently breastfeeding.    Cameron Snider LPN    "

## 2023-03-08 NOTE — PROGRESS NOTES
HISTORY OF PRESENT ILLNESS:  Diana returns to clinic status post revision of the septum as well as maxillary endoscopic sinus surgery.  She has had a collection of fluid status post her transsphenoidal procedure, which was carried out in 2021.  She feels she has some relief of pressure there.  She has no feeling of dysphagia or odynophagia.  She does have a right frontal headache today.  She denies any meningeal signs or symptoms, fluid flow from the nose, clear or discolored rhinorrhea.  She does relate that she had a fainting incident in the shower and this may have been vasovagal or blood pressure issues.    PHYSICAL EXAMINATION:  Today's examination shows nasal passages clear.  There is no collection of fluid.  There is no obvious infection or cellulitis.    ASSESSMENT:  Stable, status post endoscopic sinus surgery and septal revision.    PLAN:  We will follow up with her as needed.  Dr. Pickens has not required any postoperative imaging to this point.  I encouraged her to continue to use nasal saline.

## 2023-03-08 NOTE — PATIENT INSTRUCTIONS
1. You were seen in the ENT Clinic today by Dr. Davis.  If you have any questions or concerns after your appointment, please call   - Option 1: ENT Clinic: [phone]   - Option 2: [facility] (Dr. Davis's Nurse): [phone]                  Marily BOYKIN (Dr. Davis's Nurse): [phone]    2.   Plan to return to clinic as needed    How to Contact Us:  Send a [employer] message to your provider. Our team will respond to you via [employer]. Occasionally, we will need to call you to get further information.  For urgent matters (Monday-Friday), call the ENT Clinic: [phone] and speak with a call center team member - they will route your call appropriately.   If you'd like to speak directly with a nurse, please find our contact information below. We do our best to check voicemail frequently throughout the day, and will work to call you back within 1-2 days. For urgent matters, please use the general clinic phone numbers listed above.       Gladis Dacosta LPN  [facility] - Otolaryngology

## 2023-03-08 NOTE — LETTER
3/8/2023       RE: Diana Thompson  [address]  [place] [address]     Dear Colleague,    Thank you for referring your patient, Diana Thompson, to the [facility] [place] at [facility]. Please see a copy of my visit note below.    HISTORY OF PRESENT ILLNESS:  Diana returns to clinic status post revision of the septum as well as maxillary endoscopic sinus surgery.  She has had a collection of fluid status post her transsphenoidal procedure, which was carried out in 2021.  She feels she has some relief of pressure there.  She has no feeling of dysphagia or odynophagia.  She does have a right frontal headache today.  She denies any meningeal signs or symptoms, fluid flow from the nose, clear or discolored rhinorrhea.  She does relate that she had a fainting incident in the shower and this may have been vasovagal or blood pressure issues.    PHYSICAL EXAMINATION:  Today's examination shows nasal passages clear.  There is no collection of fluid.  There is no obvious infection or cellulitis.    ASSESSMENT:  Stable, status post endoscopic sinus surgery and septal revision.    PLAN:  We will follow up with her as needed.  Dr. Pickens has not required any postoperative imaging to this point.  I encouraged her to continue to use nasal saline.          Again, thank you for allowing me to participate in the care of your patient.      Sincerely,    Eamon Davis MD

## 2023-09-05 ENCOUNTER — TELEPHONE (OUTPATIENT)
Dept: NEUROSURGERY | Facility: CLINIC | Age: 68
End: 2023-09-05
Payer: COMMERCIAL

## 2023-09-05 NOTE — TELEPHONE ENCOUNTER
Last Visit: 12/1/22    Next Visit: None scheduled    Name of Provider:  Dr. Pickens    Assessment: Patient s/p endoscopic endonasal resection of suprasellar mass 12/21/21 with Dr. Pickens.     Patient saw Dr. Pickens 12/1/22 who recommended repeat MRI in 1 year. MRI is scheduled for 11/27/23.    Patient calls with complaint of vision changes. Patient [place] in the car. She states she woke up and the white road line on the shoulder of the road was distorted when looking with right eye. Patient states this happened x4 yesterday while on the way home. This has not happened yet today. Denies other new symptoms.     Prior to surgery patient had vision changes but they were different than what she experienced yesterday.     Patient wondering what she should do for next steps - see nsg or possibly eye doctor?    Recommendation given: Advised encounter will be routed to care team for review and recommendations. Advised if any sudden or severe vision changes to seek emergency care. Patient voiced agreement and understanding.     Action needed from provider: Patient scheduled for repeat MRI 11/27. Do you want her to complete this sooner? Should she see eye doctor? Other recommendations? Thanks

## 2023-09-05 NOTE — TELEPHONE ENCOUNTER
Per Dr. Celio figueroa to repeat MRI now. Called patient to update. Provided phone number for imaging scheduling. Patient will call to re-schedule MRI. Patient will call clinic with any additional questions or concerns.

## 2023-09-05 NOTE — TELEPHONE ENCOUNTER
Patient had surgery with  on 12/21/21 and yesterday started getting symptoms. She said she saw two lines from her eyes which occurred 4 times. She is asking if she should come in because this could be related to her pituitary gland which could possibly be reoccurring or if she should go see the eye doctor.

## 2023-09-20 ENCOUNTER — HOSPITAL ENCOUNTER (OUTPATIENT)
Dept: MRI IMAGING | Facility: CLINIC | Age: 68
Discharge: HOME | End: 2023-09-20
Attending: NEUROLOGICAL SURGERY | Admitting: NEUROLOGICAL SURGERY
Payer: COMMERCIAL

## 2023-09-20 DIAGNOSIS — E89.3 S/P TRANSSPHENOIDAL HYPOPHYSECTOMY (H): ICD-10-CM

## 2023-09-20 PROCEDURE — 70553 MRI BRAIN STEM W/O & W/DYE: CPT

## 2023-09-20 PROCEDURE — A9585 GADOBUTROL INJECTION: HCPCS | Performed by: NEUROLOGICAL SURGERY

## 2023-09-20 PROCEDURE — 255N000002 HC RX 255 OP 636: Performed by: NEUROLOGICAL SURGERY

## 2023-09-20 RX ORDER — GADOBUTROL 604.72 MG/ML
6 INJECTION INTRAVENOUS ONCE
Status: COMPLETED | OUTPATIENT
Start: 2023-09-20 | End: 2023-09-20

## 2023-09-20 RX ADMIN — GADOBUTROL 6 ML: 604.72 INJECTION INTRAVENOUS at 08:12

## 2023-09-26 ENCOUNTER — TELEPHONE (OUTPATIENT)
Dept: NEUROSURGERY | Facility: CLINIC | Age: 68
End: 2023-09-26
Payer: COMMERCIAL

## 2023-09-26 NOTE — TELEPHONE ENCOUNTER
Dr. Pickens states MRI looks great, no recurrence or residual.  Repeat in a year -    Placed call to pt. Updated her of results. Reminder sent to RN pool for early sept 2024 to place orders and schedule pt.

## 2023-09-26 NOTE — TELEPHONE ENCOUNTER
Patient requesting a call from someone from Dr. Pickens's team to discuss her MRI results and next steps.    Call back # [phone].

## 2024-03-29 DIAGNOSIS — Z12.11 COLON CANCER SCREENING: ICD-10-CM

## 2024-04-12 ENCOUNTER — ORDERS ONLY (AUTO-RELEASED) (OUTPATIENT)
Dept: FAMILY MEDICINE | Facility: CLINIC | Age: 69
End: 2024-04-12
Payer: COMMERCIAL

## 2024-04-12 DIAGNOSIS — Z12.11 COLON CANCER SCREENING: ICD-10-CM

## 2024-04-28 LAB — NONINV COLON CA DNA+OCC BLD SCRN STL QL: NEGATIVE

## 2024-05-30 ENCOUNTER — TELEPHONE (OUTPATIENT)
Dept: FAMILY MEDICINE | Facility: CLINIC | Age: 69
End: 2024-05-30
Payer: COMMERCIAL

## 2024-05-30 NOTE — LETTER
May 30, 2024        To  Diana Thompson  [address]  [place] [address]        Your team at [facility] cares about your health. We have reviewed your chart and based on our findings; we are making the following recommendations to better manage your health.     You are in particular need of attention regarding the following:     Schedule Annual MAMMOGRAPHY. [facility] scheduling number is [phone] or schedule in [facility] (self referral).  PREVENTATIVE VISIT: Annual Medicare Wellness:Schedule an Annual Medicare Wellness Exam. Please call your [facility] clinic to set up your appointment.    If you have already completed these items, please contact the clinic via phone or   [facility] so your care team can review and update your records. Thank you for   choosing [facility] for your healthcare needs. For any questions,   concerns, or to schedule an appointment please contact our clinic.    Healthy Regards,      Your [facility] Care Team

## 2024-05-30 NOTE — TELEPHONE ENCOUNTER
Patient Quality Outreach    Patient is due for the following:   Breast Cancer Screening - Mammogram  Physical Preventive Adult Physical    Next Steps:   Schedule a Annual Wellness Visit    Type of outreach:    Sent letter.    Next Steps:  Reach out within 90 days via Phone.    Max number of attempts reached: No. Will try again in 90 days if patient still on fail list.    Questions for provider review:    None           MAGGIE BROWNLEE MA  Chart routed to none.

## 2024-08-12 ENCOUNTER — TRANSFERRED RECORDS (OUTPATIENT)
Dept: HEALTH INFORMATION MANAGEMENT | Facility: CLINIC | Age: 69
End: 2024-08-12
Payer: COMMERCIAL

## 2024-08-27 ENCOUNTER — TRANSFERRED RECORDS (OUTPATIENT)
Dept: HEALTH INFORMATION MANAGEMENT | Facility: CLINIC | Age: 69
End: 2024-08-27
Payer: COMMERCIAL

## 2024-09-03 DIAGNOSIS — E89.3 S/P TRANSSPHENOIDAL HYPOPHYSECTOMY (H): Primary | ICD-10-CM

## 2024-09-10 ENCOUNTER — TRANSFERRED RECORDS (OUTPATIENT)
Dept: HEALTH INFORMATION MANAGEMENT | Facility: CLINIC | Age: 69
End: 2024-09-10
Payer: COMMERCIAL

## 2024-09-10 ENCOUNTER — MEDICAL CORRESPONDENCE (OUTPATIENT)
Dept: HEALTH INFORMATION MANAGEMENT | Facility: CLINIC | Age: 69
End: 2024-09-10
Payer: COMMERCIAL

## 2024-09-10 ENCOUNTER — TELEPHONE (OUTPATIENT)
Dept: NEUROSURGERY | Facility: CLINIC | Age: 69
End: 2024-09-10
Payer: COMMERCIAL

## 2024-09-10 NOTE — TELEPHONE ENCOUNTER
Message left for patient to schedule annual MRI imaging and then a follow up appointment with Dr. Pickens. Patient may be scheduled at either [place], [place], or [place] for clinic follow up visit with Dr. Pickens.    Patient advised to call [phone] for scheduling of MRI/clinic follow up.

## 2024-09-13 NOTE — TELEPHONE ENCOUNTER
2nd attempt:    Message left for patient to schedule annual MRI imaging and then a follow up appointment with Dr. Pickens. Patient may be scheduled at either [place], [place], or [place] for clinic follow up visit with Dr. Pickens.     Patient advised to call [phone] for scheduling of MRI/clinic follow up.

## 2024-09-18 NOTE — TELEPHONE ENCOUNTER
Left Voicemail (3rd Attempt) for the patient to call back and schedule the following:    Location: [facility]  Provider: Dr. Pickens  Appointment type: return adult neurosurgery  Appointment mode: virtual or in person  Return date: next available    Specialty phone number: [phone]    Is Imaging Needed: Yes - MRI  Imaging Phone Number to provide to patient: [phone]    Additional Notes: Patient is due for annual MRI and follow up with Dr. Pickens, please help schedule.

## 2024-09-27 ENCOUNTER — TELEPHONE (OUTPATIENT)
Dept: NEUROSURGERY | Facility: CLINIC | Age: 69
End: 2024-09-27
Payer: COMMERCIAL

## 2024-09-27 ENCOUNTER — NURSE TRIAGE (OUTPATIENT)
Dept: NURSING | Facility: CLINIC | Age: 69
End: 2024-09-27
Payer: COMMERCIAL

## 2024-09-27 NOTE — TELEPHONE ENCOUNTER
DOS: 12/21/21  Surgery: Endoscopic endonasal resection of suprasellar mass   Surgeon: Dr Pickens     Assessment: has upcoming appt with Dr pickens  Discussed recent imaging with Dr Pickens who reports looks fine and rec repeat in 1 year.   Patient called to report headache and concerned her sodium may be the problem   Does not appear any other provider has every monitored her sodium outpatient   Has not seen her primary in a couple years     Last night she got out of bed and immediately felt poorly including sweating, imbalance (had to sit right away) and headache.     All sx improved shortly after episode except a lingering right forehead pressure.   She has never experience anything like this except this was a sx she had prior to the surgery     Recommendation given: sounds like vasovagal episode. Could check sodium for piece of mind    Action needed from provider: jen

## 2024-09-27 NOTE — TELEPHONE ENCOUNTER
69 year old calling with new symptoms  last night got up and felt clammy,sweaty and dizzy  Had a frontal headache   symptoms improved   the headache continues off and on  Pressure has been constant   Concerned her sodium may be the problem       Reason for Disposition   Patient wants to be seen    Additional Information   Negative: Difficult to awaken or acting confused (e.g., disoriented, slurred speech)   Negative: Weakness of the face, arm or leg on one side of the body and new-onset   Negative: Numbness of the face, arm or leg on one side of the body and new-onset   Negative: Loss of speech or garbled speech and new-onset   Negative: Passed out (i.e., fainted, collapsed and was not responding)   Negative: Sounds like a life-threatening emergency to the triager   Negative: Followed a head injury within last 3 days   Negative: Traumatic Brain Injury (TBI) is suspected   Negative: Sinus pain of forehead and yellow or green nasal discharge   Negative: Pregnant   Negative: Unable to walk without falling   Negative: Stiff neck (can't touch chin to chest)   Negative: Possibility of carbon monoxide exposure   Negative: SEVERE headache, states 'worst headache' of life   Negative: SEVERE headache, sudden-onset (i.e., reaching maximum intensity within seconds to 1 hour)   Negative: Severe pain in one eye   Negative: Loss of vision or double vision  (Exception: Same as prior migraines.)   Negative: Patient sounds very sick or weak to the triager   Negative: Fever > 103 F (39.4 C)   Negative: Fever > 100.0 F (37.8 C) and has diabetes mellitus or a weak immune system (e.g., HIV positive, cancer chemotherapy, organ transplant, splenectomy, chronic steroids)   Negative: SEVERE headache (e.g., excruciating) and has had severe headaches before   Negative: SEVERE headache and not relieved by pain meds   Negative: SEVERE headache and vomiting   Negative: SEVERE headache and fever    Answer Assessment - Initial Assessment  "Questions  1. LOCATION: \"Where does it hurt?\"       forehead  2. ONSET: \"When did the headache start?\" (Minutes, hours or days)       Last night  3. PATTERN: \"Does the pain come and go, or has it been constant since it started?\"      Comes and goes  4. SEVERITY: \"How bad is the pain?\" and \"What does it keep you from doing?\"  (e.g., Scale 1-10; mild, moderate, or severe)    - MILD (1-3): doesn't interfere with normal activities     - MODERATE (4-7): interferes with normal activities or awakens from sleep     - SEVERE (8-10): excruciating pain, unable to do any normal activities         7/10  now just feels pressure    5. RECURRENT SYMPTOM: \"Have you ever had headaches before?\" If Yes, ask: \"When was the last time?\" and \"What happened that time?\"       Not sure if its the same  6. CAUSE: \"What do you think is causing the headache?\"      Not sure  7. MIGRAINE: \"Have you been diagnosed with migraine headaches?\" If Yes, ask: \"Is this headache similar?\"       -  8. HEAD INJURY: \"Has there been any recent injury to the head?\"       no  9. OTHER SYMPTOMS: \"Do you have any other symptoms?\" (fever, stiff neck, eye pain, sore throat, cold symptoms)      Had an episode last night where she got up  clammy sweaty dizzy and head hurt      10. PREGNANCY: \"Is there any chance you are pregnant?\" \"When was your last menstrual period?\"        no    Protocols used: Headache-A-[place]    "

## 2024-09-27 NOTE — TELEPHONE ENCOUNTER
Patient has upcoming follow up next week s/p endonasal resection almost 3 yrs ago     Pt recently had an MRI ordered by a different provider  Discussed with Dr Pickens who says this MRI is sufficient for the yearly repeat imaging and it looks fine. Repeat in 1 yr    Called patient to relay message   Attempted to reach out to patient, no answer. Left voice message for them to call clinic back to further discuss.

## 2024-09-27 NOTE — TELEPHONE ENCOUNTER
Follow up with PCP per provider   Called patient  She is ok to forgo her appt with Dr Pickens next week as he has already reviewed her MRI and has not concerns    Repeat MR in 1 yr   Reminder sent to RN team

## 2025-04-25 NOTE — CONSULTS
Onset: 4/24       Location / description: felt something go into the left eye doing yard work - sensitive to the touch/ redness/ puffy/  flushed with water yesterday- does not see anything in the eye denies discharge or change in vision     Precipitating Factors: see above     Pain Scale (1-10), 10 highest: 2/10 eye pain     What improves/worsens symptoms: worse when touching     Symptom specific medications: rewetting drops     LMP : Only if pertains to symptom. N/a     Are you pregnant or breast feeding: n/a    Recent visits (last 3-4 weeks) for same reason or recent surgery:  no recent visit       PLAN:    Provider's office  See Provider within 24 hour requesting appointment with Dr. Motta (opth)- per KB page - warm transferred to office to assist with scheduling. Advised if unable to be seen with in time frame to go to ED for evaluation    Patient/Caller agrees to follow recommendations.           Reason for Disposition   Patient wants to be seen    Protocols used: Eye - Foreign Body-A-[place]     [facility]    Neurosurgery Consultation     Date of Admission:  12/29/2021  Date of Consult (When I saw the patient): 12/29/21    Assessment & Plan   Diana Thompson is a 66 year old female who was admitted on 12/29/2021 with headache, sinus/jaw pain, and syncopal episode at home. 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 Pickens and Dr. Davis. Head CT with post op changes, no acute intracranial hemorrhage. Patient also has hyponatremia, most recent sodium 123. On exam, patient is awake, alert, oriented, somewhat drowsy. She is able to follow commands and moves all extremities. No nasal drainage noted on exam today. Nasal splints intact.     Plan:  - Patient admitted for correction of sodium. Appreciate assistance from Hospitalist.   - Continue neuro checks and pain control measures   - From NSG standpoint, okay to discharge once medically stable   - Follow up as scheduled:    Dr. Pickens on 1/10   Dr. Davis on 1/17     Active Problems:    Trigeminal neuralgia    Syncope and collapse    Facial pain    Hyponatremia    I have discussed the following assessment and plan with Dr. Mcknight who is in agreement with initial plan and will follow up with further consultation recommendations.    Aruna Coffman CNP  [facility] Neurosurgery  [address] [address]  Tel [phone]  Pager [phone]    Code Status    Full Code    Reason for Consult   Reason for consult: I was asked by Dr. Ceja to evaluate this patient for pain s/p endoscopic endonasal resection of suprasellar mass  .  Primary Care Physician   Isis Hernández    Chief Complaint   Sinus pain     History is obtained from the patient and electronic health record    History of Present Illness   Diana Thompson is a 66 year old female who was admitted on 12/29/2021 with headache, sinus/jaw pain, and syncopal episode at home. 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 Pickens and Dr. Davis. Patient reports a headache yesterday, as well as syncopal episode while sitting on the toilet.  lowered her to the ground. Patient presented to [facility] ED for further evaluation. Head CT revealed post op changes, no acute intracranial hemorrhage. Patient also has hyponatremia, most recent sodium 123. On exam, patient is awake, alert, oriented, somewhat drowsy. She is able to follow commands and moves all extremities. No nasal drainage noted on exam today. Nasal splints intact. She reports her headache has somewhat improved, but she continues to have sinus/jaw pain.     CT HEAD W/O CONTRAST 12/29/2021 2:40 AM  IMPRESSION:  1.  Redemonstrated postoperative changes consistent with a transsphenoidal sella/suprasellar tumor resection with a cystic resection cavity in the sella/suprasellar region. Findings are grossly similar to the prior MRI.  2.  No definite acute intracranial hemorrhage.    Past Medical History   I have reviewed this patient's medical history and updated it with pertinent information if needed.   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  Past Surgical History:   Procedure Laterality Date     OPTICAL TRACKING SYSTEM ENDOSCOPIC ENDONASAL SURGERY N/A 12/21/2021    Procedure: Endoscopic endonasal resection of suprasellar mass;  Surgeon: Roberto Pickens MD;  Location: [facility] OR     TUBAL LIGATION  1982       Prior to Admission Medications   Prior to Admission Medications   Prescriptions Last Dose Informant Patient Reported? Taking?   Multiple Vitamins-Minerals (MULTIVITAMIN ADULTS PO) 12/28/2021  at AM Spouse/Significant Other Yes Yes   Sig: Take 1-2 Tablespoonful by mouth   acetaminophen (TYLENOL) 500 MG tablet 12/28/2021 at PM Spouse/Significant Other Yes Yes   Sig: Take 1,000 mg by mouth every 6 hours as needed for pain   calcium gluconate 500 MG tablet 12/28/2021 at AM Spouse/Significant Other Yes Yes   Sig: Take 1,000 mg by mouth daily   oxyCODONE (ROXICODONE) 5 MG tablet 12/28/2021 at PM Spouse/Significant Other No Yes   Sig: Take 1 tablet (5 mg) by mouth every 4 hours as needed for pain   senna-docusate (SENOKOT-S/PERICOLACE) 8.6-50 MG tablet  Spouse/Significant Other No Yes   Sig: Take 1 tablet by mouth 2 times daily   sodium chloride (OCEAN) 0.65 % nasal spray   Yes Yes   Sig: Spray 1 spray into both nostrils daily as needed for congestion   tiZANidine (ZANAFLEX) 4 MG tablet  Spouse/Significant Other No Yes   Sig: Take 0.5 tablets (2 mg) by mouth 3 times daily as needed for muscle spasms      Facility-Administered Medications: None     Allergies   Allergies   Allergen Reactions     Macrodantin [Nitrofurantoin] Hives     Sulfa Drugs        Social History   I have reviewed this patient's social history and updated it with pertinent information if needed. Diana KUHN Leonidallie  reports that she has never smoked. She has never used smokeless tobacco. She reports current alcohol use. She reports that she does not use drugs.    Family History   I have reviewed this patient's family history and updated it with pertinent information if needed.   Family History   Problem Relation Age of Onset     Diabetes Mother      Heart Disease Mother         CHF     Respiratory Father         COPD     Cancer No family hx of      Colon Cancer No family hx of      Breast Cancer No family hx of        Review of Systems   10 point ROS negative other than symptoms noted in HPI     Physical Exam   Temp: 98.3  F (36.8  C) Temp src: Oral BP: 126/42 Pulse: 65   Resp: 16 SpO2: 94 % O2 Device: None (Room air)    Vital Signs with  Ranges  Temp:  [98.3  F (36.8  C)-99.7  F (37.6  C)] 98.3  F (36.8  C)  Pulse:  [65-71] 65  Resp:  [16] 16  BP: ()/(42-65) 126/42  SpO2:  [93 %-99 %] 94 %  120 lbs 0 oz     , Blood pressure 126/42, pulse 65, temperature 98.3  F (36.8  C), temperature source Oral, resp. rate 16, weight 120 lb (54.4 kg), SpO2 94 %, not currently breastfeeding.  120 lbs 0 oz  HEENT:  Normocephalic, atraumatic.  PERRLA.  EOM s intact.   Neck:  Supple, non-tender, without lymphadenopathy.  Heart:  No peripheral edema  Lungs:  No SOB  Skin:  Warm and dry, good capillary refill. Nasal splints intact.   Extremities:  Good radial and dorsalis pedis pulses bilaterally, no edema, cyanosis or clubbing.    NEUROLOGICAL EXAMINATION:   Mental status:  Alert and Oriented x 3, somewhat drowsy, speech is fluent.  Cranial nerves:  II-XII intact.   Motor:  Strength is 5/5 throughout the upper and lower extremities  Shoulder Abduction:  Right:  5/5   Left:  5/5  Biceps:                      Right:  5/5   Left:  5/5  Triceps:                     Right:  5/5   Left:  5/5  Wrist Extensors:       Right:  5/5   Left:  5/5  Wrist Flexors:           Right:  5/5   Left:  5/5  Interosseus :             Right:  5/5   Left:  5/5  Hip Flexor:                Right: 5/5  Left:  5/5  Hip Adductor:            Right:  5/5  Left:  5/5  Hip Abductor:            Right:  5/5  Left:  5/5  Gastroc Soleus:        Right:  5/5  Left:  5/5  Tib/Ant:                     Right:  5/5  Left:  5/5  EHL:                         Right:  5/5  Left:  5/5  Sensation:  Intact  Coordination:  Smooth finger to nose testing.   Negative pronator drift.      Data     CBC RESULTS:   Recent Labs   Lab Test 12/29/21  0236   WBC 7.9   RBC 3.50*   HGB 10.9*   HCT 32.0*   MCV 91   MCH 31.1   MCHC 34.1   RDW 13.2        Basic Metabolic Panel:  Lab Results   Component Value Date     12/29/2021      Lab Results   Component Value Date    POTASSIUM 4.7 12/29/2021     Lab Results    Component Value Date    CHLORIDE 92 12/29/2021     Lab Results   Component Value Date    LA 8.4 12/29/2021     Lab Results   Component Value Date    CO2 25 12/29/2021     Lab Results   Component Value Date    BUN 18 12/29/2021     Lab Results   Component Value Date    CR 0.88 12/29/2021     Lab Results   Component Value Date    GLC 87 12/29/2021    GLC 82 03/22/2021     INR:  No results found for: INR

## 2025-06-06 PROBLEM — J32.9 SINUSITIS: Status: RESOLVED | Noted: 2022-12-30 | Resolved: 2025-06-06

## 2025-08-25 DIAGNOSIS — E89.3 S/P TRANSSPHENOIDAL HYPOPHYSECTOMY: Primary | ICD-10-CM

## (undated) DEVICE — TUBING IRRIGATOR STRAIGHTSHOT XPS 1895522

## (undated) DEVICE — SYR 30ML LL W/O NDL 302832

## (undated) DEVICE — DRSG STERI STRIP 1/2X4" R1547

## (undated) DEVICE — PIN SKULL MAYFIELD ADULT TITANIUM 3/PK A1120

## (undated) DEVICE — GOWN XLG DISP 9545

## (undated) DEVICE — SOL NACL 0.9% INJ 250ML BAG 2B1322Q

## (undated) DEVICE — GLOVE PROTEXIS W/NEU-THERA 8.0  2D73TE80

## (undated) DEVICE — PACK NASAL SINUS SEN15NSFSF

## (undated) DEVICE — NDL 22GA 1" 305155

## (undated) DEVICE — GLOVE PROTEXIS W/NEU-THERA 6.5  2D73TE65

## (undated) DEVICE — PREP SKIN SCRUB TRAY 4461A

## (undated) DEVICE — DRAPE U SPLIT 74X120" 29440

## (undated) DEVICE — SOL NACL 0.9% IRRIG 500ML BOTTLE 2F7123

## (undated) DEVICE — GLOVE PROTEXIS W/NEU-THERA 8.5  2D73TE85

## (undated) DEVICE — DECANTER VIAL 2006S

## (undated) DEVICE — DRAPE IOBAN INCISE 23X17" 6650EZ

## (undated) DEVICE — Device

## (undated) DEVICE — BONE WAX 2.5GM W31G

## (undated) DEVICE — ANTIFOG SOLUTION W/FOAM PAD 31142527

## (undated) DEVICE — WIPE INSTRUMENT MEROCEL 400200

## (undated) DEVICE — TRACKER ENT OTS PATIENT FUSION 9733534

## (undated) DEVICE — STPL SKIN 35W ROTATING HEAD PRW35

## (undated) DEVICE — MARKER SPHERES PASSIVE MEDT PACK 5 8801075

## (undated) DEVICE — TRACKER ENT OTS INSTRUMENT FUSION 9733533

## (undated) DEVICE — KIT SEALER DURASEAL EXACT SP HYDROGEL 5ML SGL USE 20-6520

## (undated) DEVICE — SUCTION MANIFOLD NEPTUNE 2 SYS 1 PORT 702-025-000

## (undated) DEVICE — ESU PENCIL W/HOLSTER E2350H

## (undated) DEVICE — PACK ENT ENDOSCOPY CUSTOM ASC

## (undated) DEVICE — SYR 03ML LL W/O NDL 309657

## (undated) DEVICE — SPONGE COTTONOID 1/2X1/2" 80-1400

## (undated) DEVICE — NDL 25GA 2"  8881200441

## (undated) DEVICE — SURGICEL HEMOSTAT 2X14" 1951

## (undated) DEVICE — SPONGE SURGIFOAM 50

## (undated) DEVICE — GLOVE PROTEXIS W/NEU-THERA 7.5  2D73TE75

## (undated) DEVICE — SPONGE COTTON BALL 1/4" W/STRING 30-00

## (undated) DEVICE — SPONGE COTTONOID 1/2X3" 80-1407

## (undated) DEVICE — NDL SPINAL 22GA 3.5" QUINCKE 405181

## (undated) DEVICE — PREP CHLORAPREP 26ML TINTED ORANGE  260815

## (undated) DEVICE — DRAPE SHEET REV FOLD 3/4 9349

## (undated) DEVICE — SPONGE COTTONOID 2X1/2" 80-1406

## (undated) DEVICE — CATH TRAY FOLEY SURESTEP 16FR WDRAIN BAG STLK LATEX A300316A

## (undated) DEVICE — GLOVE PROTEXIS POWDER FREE SMT 7.5  2D72PT75X

## (undated) DEVICE — DRAPE POUCH INSTRUMENT 1018

## (undated) DEVICE — SOL WATER IRRIG 1000ML BOTTLE 2F7114

## (undated) DEVICE — ENDO INSERT BLADE KNIFE MICRO STORZ SICKLE 28164MS/3

## (undated) DEVICE — SPONGE RAY-TEC 4X8" 7318

## (undated) DEVICE — ESU GROUND PAD UNIVERSAL W/O CORD

## (undated) DEVICE — DRAPE MAYO STAND 23X54 8337

## (undated) DEVICE — GLOVE PROTEXIS BLUE W/NEU-THERA 7.0  2D73EB70

## (undated) DEVICE — TUBING SUCTION SOFT 20'X3/16" 0036570

## (undated) DEVICE — LINEN TOWEL PACK X5 5464

## (undated) DEVICE — DRAPE WARMER 66X44" ORS-300

## (undated) DEVICE — DRAPE ISOLATION BAG 1003

## (undated) DEVICE — TOOL DISSECT MIDAS MR8 13CM ND BALL DC 3MM D MR8-ND13BA30DC

## (undated) DEVICE — SOL NACL 0.9% INJ 1000ML BAG 2B1324X

## (undated) DEVICE — GLOVE PROTEXIS BLUE W/NEU-THERA 8.5  2D73EB85

## (undated) DEVICE — SYR EAR BULB 3OZ 0035830

## (undated) DEVICE — SPECIMEN TRAP STRYKER NEPTUNE IN-LINE 0700-050-000

## (undated) RX ORDER — DEXAMETHASONE SODIUM PHOSPHATE 4 MG/ML
INJECTION, SOLUTION INTRA-ARTICULAR; INTRALESIONAL; INTRAMUSCULAR; INTRAVENOUS; SOFT TISSUE
Status: DISPENSED
Start: 2021-12-21

## (undated) RX ORDER — ONDANSETRON 2 MG/ML
INJECTION INTRAMUSCULAR; INTRAVENOUS
Status: DISPENSED
Start: 2023-02-22

## (undated) RX ORDER — ACETAMINOPHEN 325 MG/1
TABLET ORAL
Status: DISPENSED
Start: 2023-02-22

## (undated) RX ORDER — PROPOFOL 10 MG/ML
INJECTION, EMULSION INTRAVENOUS
Status: DISPENSED
Start: 2023-02-22

## (undated) RX ORDER — FENTANYL CITRATE 50 UG/ML
INJECTION, SOLUTION INTRAMUSCULAR; INTRAVENOUS
Status: DISPENSED
Start: 2023-02-22

## (undated) RX ORDER — CEFAZOLIN SODIUM 2 G/100ML
INJECTION, SOLUTION INTRAVENOUS
Status: DISPENSED
Start: 2021-12-21

## (undated) RX ORDER — GINSENG 100 MG
CAPSULE ORAL
Status: DISPENSED
Start: 2021-12-21

## (undated) RX ORDER — OXYMETAZOLINE HYDROCHLORIDE 0.05 G/100ML
SPRAY NASAL
Status: DISPENSED
Start: 2023-02-22

## (undated) RX ORDER — PROPOFOL 10 MG/ML
INJECTION, EMULSION INTRAVENOUS
Status: DISPENSED
Start: 2021-12-21

## (undated) RX ORDER — OXYMETAZOLINE HYDROCHLORIDE 0.05 G/100ML
SPRAY NASAL
Status: DISPENSED
Start: 2021-12-21

## (undated) RX ORDER — CEFAZOLIN SODIUM 2 G/50ML
SOLUTION INTRAVENOUS
Status: DISPENSED
Start: 2023-02-22

## (undated) RX ORDER — ONDANSETRON 2 MG/ML
INJECTION INTRAMUSCULAR; INTRAVENOUS
Status: DISPENSED
Start: 2021-12-21

## (undated) RX ORDER — LIDOCAINE HYDROCHLORIDE AND EPINEPHRINE 10; 10 MG/ML; UG/ML
INJECTION, SOLUTION INFILTRATION; PERINEURAL
Status: DISPENSED
Start: 2021-12-21

## (undated) RX ORDER — ACETAMINOPHEN 325 MG/1
TABLET ORAL
Status: DISPENSED
Start: 2021-12-21

## (undated) RX ORDER — EPHEDRINE SULFATE 50 MG/ML
INJECTION, SOLUTION INTRAMUSCULAR; INTRAVENOUS; SUBCUTANEOUS
Status: DISPENSED
Start: 2023-02-22

## (undated) RX ORDER — LIDOCAINE HYDROCHLORIDE 20 MG/ML
INJECTION, SOLUTION EPIDURAL; INFILTRATION; INTRACAUDAL; PERINEURAL
Status: DISPENSED
Start: 2021-12-21

## (undated) RX ORDER — DEXAMETHASONE SODIUM PHOSPHATE 10 MG/ML
INJECTION, SOLUTION INTRAMUSCULAR; INTRAVENOUS
Status: DISPENSED
Start: 2023-02-22

## (undated) RX ORDER — REMIFENTANIL HYDROCHLORIDE 1 MG/ML
INJECTION, POWDER, LYOPHILIZED, FOR SOLUTION INTRAVENOUS
Status: DISPENSED
Start: 2021-12-21

## (undated) RX ORDER — NEOSTIGMINE METHYLSULFATE 1 MG/ML
VIAL (ML) INJECTION
Status: DISPENSED
Start: 2021-12-21

## (undated) RX ORDER — FENTANYL CITRATE 50 UG/ML
INJECTION, SOLUTION INTRAMUSCULAR; INTRAVENOUS
Status: DISPENSED
Start: 2021-12-21

## (undated) RX ORDER — GLYCOPYRROLATE 0.2 MG/ML
INJECTION, SOLUTION INTRAMUSCULAR; INTRAVENOUS
Status: DISPENSED
Start: 2021-12-21

## (undated) RX ORDER — LIDOCAINE HYDROCHLORIDE AND EPINEPHRINE 10; 10 MG/ML; UG/ML
INJECTION, SOLUTION INFILTRATION; PERINEURAL
Status: DISPENSED
Start: 2023-02-22